# Patient Record
Sex: FEMALE | Race: WHITE | NOT HISPANIC OR LATINO | Employment: UNEMPLOYED | URBAN - METROPOLITAN AREA
[De-identification: names, ages, dates, MRNs, and addresses within clinical notes are randomized per-mention and may not be internally consistent; named-entity substitution may affect disease eponyms.]

---

## 2023-05-02 ENCOUNTER — OFFICE VISIT (OUTPATIENT)
Dept: PRIMARY CARE | Facility: CLINIC | Age: 23
End: 2023-05-02
Payer: COMMERCIAL

## 2023-05-02 VITALS
HEIGHT: 69 IN | TEMPERATURE: 98.5 F | DIASTOLIC BLOOD PRESSURE: 76 MMHG | HEART RATE: 80 BPM | RESPIRATION RATE: 12 BRPM | WEIGHT: 161 LBS | BODY MASS INDEX: 23.85 KG/M2 | SYSTOLIC BLOOD PRESSURE: 122 MMHG | OXYGEN SATURATION: 95 %

## 2023-05-02 DIAGNOSIS — R82.998 URINE LEUKOCYTES: ICD-10-CM

## 2023-05-02 DIAGNOSIS — N23 URINARY TRACT PAIN: ICD-10-CM

## 2023-05-02 DIAGNOSIS — N30.90 CYSTITIS: Primary | ICD-10-CM

## 2023-05-02 PROBLEM — R31.9 HEMATURIA: Status: ACTIVE | Noted: 2023-05-02

## 2023-05-02 PROBLEM — G43.909 MIGRAINE HEADACHE: Status: ACTIVE | Noted: 2023-05-02

## 2023-05-02 PROBLEM — F41.1 GENERALIZED ANXIETY DISORDER: Status: ACTIVE | Noted: 2023-05-02

## 2023-05-02 PROBLEM — M25.50 ARTHRALGIA OF MULTIPLE JOINTS: Status: ACTIVE | Noted: 2023-05-02

## 2023-05-02 PROBLEM — L30.9 DERMATITIS: Status: ACTIVE | Noted: 2023-05-02

## 2023-05-02 PROBLEM — F40.10 SOCIAL ANXIETY DISORDER: Status: ACTIVE | Noted: 2023-05-02

## 2023-05-02 PROBLEM — F95.9 TIC DISORDER: Status: ACTIVE | Noted: 2023-05-02

## 2023-05-02 PROBLEM — G43.109 MIGRAINE WITH AURA AND WITHOUT STATUS MIGRAINOSUS, NOT INTRACTABLE: Status: ACTIVE | Noted: 2023-05-02

## 2023-05-02 PROBLEM — L70.9 ACNE: Status: ACTIVE | Noted: 2023-05-02

## 2023-05-02 PROBLEM — R51.9 HEADACHE: Status: ACTIVE | Noted: 2023-05-02

## 2023-05-02 PROBLEM — G90.512 COMPLEX REGIONAL PAIN SYNDROME TYPE 1 OF LEFT UPPER EXTREMITY: Status: ACTIVE | Noted: 2023-05-02

## 2023-05-02 PROBLEM — N20.2 CALCULUS OF KIDNEY WITH CALCULUS OF URETER: Status: ACTIVE | Noted: 2023-05-02

## 2023-05-02 PROBLEM — F95.2 TOURETTE SYNDROME: Status: ACTIVE | Noted: 2023-05-02

## 2023-05-02 PROBLEM — G47.00 INSOMNIA: Status: ACTIVE | Noted: 2023-05-02

## 2023-05-02 LAB
POC APPEARANCE, URINE: CLEAR
POC BILIRUBIN, URINE: ABNORMAL
POC BLOOD, URINE: ABNORMAL
POC COLOR, URINE: ABNORMAL
POC GLUCOSE, URINE: ABNORMAL MG/DL
POC KETONES, URINE: ABNORMAL MG/DL
POC LEUKOCYTES, URINE: ABNORMAL
POC NITRITE,URINE: POSITIVE
POC PH, URINE: 5 PH
POC PROTEIN, URINE: ABNORMAL MG/DL
POC SPECIFIC GRAVITY, URINE: 1.02
POC UROBILINOGEN, URINE: 4 EU/DL

## 2023-05-02 PROCEDURE — 99213 OFFICE O/P EST LOW 20 MIN: CPT

## 2023-05-02 PROCEDURE — 87086 URINE CULTURE/COLONY COUNT: CPT

## 2023-05-02 PROCEDURE — 81003 URINALYSIS AUTO W/O SCOPE: CPT

## 2023-05-02 PROCEDURE — 1036F TOBACCO NON-USER: CPT

## 2023-05-02 RX ORDER — CLONIDINE HYDROCHLORIDE 0.1 MG/1
TABLET, EXTENDED RELEASE ORAL
COMMUNITY
Start: 2022-10-13 | End: 2023-11-28 | Stop reason: SDUPTHER

## 2023-05-02 RX ORDER — QUETIAPINE FUMARATE 50 MG/1
50 TABLET, FILM COATED ORAL NIGHTLY
COMMUNITY
End: 2023-10-30 | Stop reason: SDUPTHER

## 2023-05-02 RX ORDER — RIZATRIPTAN BENZOATE 10 MG/1
10 TABLET ORAL DAILY PRN
COMMUNITY
Start: 2022-01-24

## 2023-05-02 RX ORDER — SULFAMETHOXAZOLE AND TRIMETHOPRIM 800; 160 MG/1; MG/1
1 TABLET ORAL 2 TIMES DAILY
Qty: 10 TABLET | Refills: 0 | Status: SHIPPED | OUTPATIENT
Start: 2023-05-02 | End: 2023-05-07

## 2023-05-02 RX ORDER — NORELGESTROMIN AND ETHINYL ESTRADIOL 150; 35 UG/D; UG/D
1 PATCH TRANSDERMAL
COMMUNITY
End: 2023-10-17 | Stop reason: ALTCHOICE

## 2023-05-02 RX ORDER — FLUOXETINE HYDROCHLORIDE 40 MG/1
1 CAPSULE ORAL DAILY
COMMUNITY
Start: 2022-01-03 | End: 2023-10-17 | Stop reason: SDUPTHER

## 2023-05-02 RX ORDER — HYDROXYCHLOROQUINE SULFATE 200 MG/1
1 TABLET, FILM COATED ORAL DAILY
COMMUNITY
Start: 2022-10-19 | End: 2023-10-17 | Stop reason: SDUPTHER

## 2023-05-02 ASSESSMENT — ENCOUNTER SYMPTOMS
CHILLS: 1
VOMITING: 1
NAUSEA: 1
FLANK PAIN: 0
FREQUENCY: 1
SWEATS: 0
HEMATURIA: 0

## 2023-05-02 ASSESSMENT — PATIENT HEALTH QUESTIONNAIRE - PHQ9
SUM OF ALL RESPONSES TO PHQ9 QUESTIONS 1 AND 2: 0
2. FEELING DOWN, DEPRESSED OR HOPELESS: NOT AT ALL
1. LITTLE INTEREST OR PLEASURE IN DOING THINGS: NOT AT ALL

## 2023-05-02 ASSESSMENT — COLUMBIA-SUICIDE SEVERITY RATING SCALE - C-SSRS
6. HAVE YOU EVER DONE ANYTHING, STARTED TO DO ANYTHING, OR PREPARED TO DO ANYTHING TO END YOUR LIFE?: NO
2. HAVE YOU ACTUALLY HAD ANY THOUGHTS OF KILLING YOURSELF?: NO
1. IN THE PAST MONTH, HAVE YOU WISHED YOU WERE DEAD OR WISHED YOU COULD GO TO SLEEP AND NOT WAKE UP?: NO

## 2023-05-02 NOTE — PATIENT INSTRUCTIONS
Take all of your antibiotics as directed, even if you feel better  Take your antibiotics with food to minimize upset stomach  Eat yogurt 2-3 times per day while taking antibiotics.  If you do not eat yogurt, take a probiotic daily while taking antibiotics

## 2023-05-02 NOTE — PROGRESS NOTES
"Subjective   Patient ID: Allyssa Kelly is a 23 y.o. female who presents for UTI.    UTI   This is a new problem. The current episode started in the past 7 days. The problem occurs every urination. The problem has been gradually improving. The quality of the pain is described as burning. The pain is at a severity of 5/10. The pain is moderate. The maximum temperature recorded prior to her arrival was 100 - 100.9 F. The fever has been present for Less than 1 day. She is Sexually active. There is No history of pyelonephritis. Associated symptoms include chills, a discharge, frequency, nausea, urgency and vomiting. Pertinent negatives include no flank pain, hematuria, hesitancy, possible pregnancy or sweats. She has tried antibiotics (pt took some of moms keflex which has improved syptoms only had 3 pills.) for the symptoms. The treatment provided moderate relief. Her past medical history is significant for kidney stones and recurrent UTIs. There is no history of catheterization, a single kidney, urinary stasis or a urological procedure.        Review of Systems   Constitutional:  Positive for chills.   Gastrointestinal:  Positive for nausea and vomiting.   Genitourinary:  Positive for frequency and urgency. Negative for flank pain, hematuria and hesitancy.       Objective   /76   Pulse 80   Temp 36.9 °C (98.5 °F)   Resp 12   Ht 1.74 m (5' 8.5\")   Wt 73 kg (161 lb)   SpO2 95%   BMI 24.12 kg/m²     Physical Exam  Abdominal:      General: Abdomen is flat. Bowel sounds are normal.      Palpations: Abdomen is soft.      Tenderness: There is no abdominal tenderness. There is no right CVA tenderness, left CVA tenderness, guarding or rebound.         Assessment/Plan   Problem List Items Addressed This Visit    None  Visit Diagnoses       Cystitis    -  Primary    Relevant Medications    sulfamethoxazole-trimethoprim (Bactrim DS) 800-160 mg tablet    Other Relevant Orders    Urine Culture    Urinary tract " pain        Relevant Orders    POCT UA Automated manually resulted (Completed)    Urine leukocytes        Relevant Orders    Urine Culture

## 2023-05-04 LAB — URINE CULTURE: NORMAL

## 2023-06-14 LAB
ALANINE AMINOTRANSFERASE (SGPT) (U/L) IN SER/PLAS: 20 U/L (ref 7–45)
ALBUMIN (G/DL) IN SER/PLAS: 4.1 G/DL (ref 3.4–5)
ALKALINE PHOSPHATASE (U/L) IN SER/PLAS: 70 U/L (ref 33–110)
ANION GAP IN SER/PLAS: 13 MMOL/L (ref 10–20)
ASPARTATE AMINOTRANSFERASE (SGOT) (U/L) IN SER/PLAS: 31 U/L (ref 9–39)
BILIRUBIN TOTAL (MG/DL) IN SER/PLAS: 0.5 MG/DL (ref 0–1.2)
CALCIUM (MG/DL) IN SER/PLAS: 9.7 MG/DL (ref 8.6–10.3)
CARBON DIOXIDE, TOTAL (MMOL/L) IN SER/PLAS: 28 MMOL/L (ref 21–32)
CHLORIDE (MMOL/L) IN SER/PLAS: 103 MMOL/L (ref 98–107)
CREATININE (MG/DL) IN SER/PLAS: 0.89 MG/DL (ref 0.5–1.05)
ERYTHROCYTE DISTRIBUTION WIDTH (RATIO) BY AUTOMATED COUNT: 13.1 % (ref 11.5–14.5)
ERYTHROCYTE MEAN CORPUSCULAR HEMOGLOBIN CONCENTRATION (G/DL) BY AUTOMATED: 31.9 G/DL (ref 32–36)
ERYTHROCYTE MEAN CORPUSCULAR VOLUME (FL) BY AUTOMATED COUNT: 85 FL (ref 80–100)
ERYTHROCYTES (10*6/UL) IN BLOOD BY AUTOMATED COUNT: 4.95 X10E12/L (ref 4–5.2)
GFR FEMALE: >90 ML/MIN/1.73M2
GLUCOSE (MG/DL) IN SER/PLAS: 70 MG/DL (ref 74–99)
HEMATOCRIT (%) IN BLOOD BY AUTOMATED COUNT: 42 % (ref 36–46)
HEMOGLOBIN (G/DL) IN BLOOD: 13.4 G/DL (ref 12–16)
LEUKOCYTES (10*3/UL) IN BLOOD BY AUTOMATED COUNT: 4.7 X10E9/L (ref 4.4–11.3)
PLATELETS (10*3/UL) IN BLOOD AUTOMATED COUNT: 196 X10E9/L (ref 150–450)
POTASSIUM (MMOL/L) IN SER/PLAS: 4.1 MMOL/L (ref 3.5–5.3)
PROTEIN TOTAL: 6.9 G/DL (ref 6.4–8.2)
SODIUM (MMOL/L) IN SER/PLAS: 140 MMOL/L (ref 136–145)
UREA NITROGEN (MG/DL) IN SER/PLAS: 11 MG/DL (ref 6–23)

## 2023-06-15 LAB
ANTI-CENTROMERE: <0.2 AI
ANTI-CHROMATIN: <0.2 AI
ANTI-DNA (DS): <1 IU/ML
ANTI-JO-1 IGG: <0.2 AI
ANTI-NUCLEAR ANTIBODY (ANA): NEGATIVE
ANTI-RIBOSOMAL P: <0.2 AI
ANTI-RNP: 0.5 AI
ANTI-SCL-70: <0.2 AI
ANTI-SM/RNP: <0.2 AI
ANTI-SM: <0.2 AI
ANTI-SSA: <0.2 AI
ANTI-SSB: <0.2 AI

## 2023-07-18 NOTE — PROGRESS NOTES
"Jatinder Kelly is a 23 y.o. female who presents today for a sick visit    Chief Complaint   Patient presents with    Follow-up     JATINDER IS HERE TODAY WITH C/O INCREASED WEIGHT GAIN AND ALSO \"BLOATING FEELING\",AND STOMACH PRESSURE X 2 MONTHS.   PT IS ALSO REQUESTING A PREGNANCY TEST.   PT HAS C/O SHARP PAIN THAT COMES AND GOES IN THE LEFT LUNG X2 MONTHS.        Symptoms ABDOMINAL BLOATING     Pain at night? CRAMPING ON AND OFF   Weight loss? NONE, HAS GAINED 30# IN THE PAST 5-6 MONTHS   Blood in stool? NONE   Abdominal tenderness? OCCASIONAL  Fever? DENIES   Vomiting? NONE   Fatty Stools? DENIES   Diarrhea? YES     Worse after eating? YES   Coordinates with dairy, wheat, fiber, sugar?     Symptom onset has been for a time period of 4-5 month(s).     Severity is described as mild-moderate.     Course of her symptoms over time is chronic.      Pt saw GYN, Dr. Argueta in June 2023  Had 1st PAP---> NORMAL     Pt is currently not on any birth control  She was using the patch, but will switching to copper IUD next month   LMP: 6/20/2023    Review of Systems  All 13 systems were reviewed and are within normal limits except positive and pertinent negative responses which are noted below or in HPI.        Objective   Vitals:  /80   Pulse 79   Wt 75.8 kg (167 lb)   BMI 25.02 kg/m²         Physical Exam  Vitals reviewed.   Pulmonary:      Effort: Pulmonary effort is normal.   Abdominal:      General: Bowel sounds are normal. There is distension.      Tenderness: There is abdominal tenderness.         Assessment/Plan   Problem List Items Addressed This Visit    None  Visit Diagnoses       Abdominal bloating    -  Primary    Relevant Orders    CBC    Celiac Panel    H. Pylori Breath Test    POCT Pregnancy, Urine manually resulted (Completed)    POCT UA Automated manually resulted (Completed)    Weight gain        Relevant Orders    TSH with reflex to Free T4 if abnormal    Hemoglobin A1C            "

## 2023-07-19 ENCOUNTER — LAB (OUTPATIENT)
Dept: LAB | Facility: LAB | Age: 23
End: 2023-07-19
Payer: COMMERCIAL

## 2023-07-19 ENCOUNTER — OFFICE VISIT (OUTPATIENT)
Dept: PRIMARY CARE | Facility: CLINIC | Age: 23
End: 2023-07-19
Payer: COMMERCIAL

## 2023-07-19 VITALS
HEART RATE: 79 BPM | SYSTOLIC BLOOD PRESSURE: 126 MMHG | DIASTOLIC BLOOD PRESSURE: 80 MMHG | BODY MASS INDEX: 25.02 KG/M2 | WEIGHT: 167 LBS

## 2023-07-19 DIAGNOSIS — R63.5 WEIGHT GAIN: ICD-10-CM

## 2023-07-19 DIAGNOSIS — R14.0 ABDOMINAL BLOATING: ICD-10-CM

## 2023-07-19 DIAGNOSIS — R14.0 ABDOMINAL BLOATING: Primary | ICD-10-CM

## 2023-07-19 PROBLEM — G89.29 NECK PAIN, CHRONIC: Status: ACTIVE | Noted: 2023-07-19

## 2023-07-19 PROBLEM — L93.2 CUTANEOUS LUPUS ERYTHEMATOSUS: Status: ACTIVE | Noted: 2023-07-19

## 2023-07-19 PROBLEM — D16.9 BENIGN BONE TUMOR: Status: ACTIVE | Noted: 2023-07-19

## 2023-07-19 PROBLEM — M54.2 NECK PAIN, CHRONIC: Status: ACTIVE | Noted: 2023-07-19

## 2023-07-19 LAB
DEAMIDATED GLIADIN PEPTIDE IGA: <1 U/ML (ref 0–14)
DEAMIDATED GLIADIN PEPTIDE IGG: <1 U/ML (ref 0–14)
ERYTHROCYTE DISTRIBUTION WIDTH (RATIO) BY AUTOMATED COUNT: 13.2 % (ref 11.5–14.5)
ERYTHROCYTE MEAN CORPUSCULAR HEMOGLOBIN CONCENTRATION (G/DL) BY AUTOMATED: 32.6 G/DL (ref 32–36)
ERYTHROCYTE MEAN CORPUSCULAR VOLUME (FL) BY AUTOMATED COUNT: 86 FL (ref 80–100)
ERYTHROCYTES (10*6/UL) IN BLOOD BY AUTOMATED COUNT: 4.82 X10E12/L (ref 4–5.2)
ESTIMATED AVERAGE GLUCOSE FOR HBA1C: 91 MG/DL
HEMATOCRIT (%) IN BLOOD BY AUTOMATED COUNT: 41.4 % (ref 36–46)
HEMOGLOBIN (G/DL) IN BLOOD: 13.5 G/DL (ref 12–16)
HEMOGLOBIN A1C/HEMOGLOBIN TOTAL IN BLOOD: 4.8 %
LEUKOCYTES (10*3/UL) IN BLOOD BY AUTOMATED COUNT: 5.8 X10E9/L (ref 4.4–11.3)
PLATELETS (10*3/UL) IN BLOOD AUTOMATED COUNT: 212 X10E9/L (ref 150–450)
POC APPEARANCE, URINE: CLEAR
POC BILIRUBIN, URINE: NEGATIVE
POC BLOOD, URINE: NEGATIVE
POC COLOR, URINE: YELLOW
POC GLUCOSE, URINE: NEGATIVE MG/DL
POC KETONES, URINE: NEGATIVE MG/DL
POC LEUKOCYTES, URINE: NEGATIVE
POC NITRITE,URINE: NEGATIVE
POC PH, URINE: 6 PH
POC PROTEIN, URINE: NEGATIVE MG/DL
POC SPECIFIC GRAVITY, URINE: 1.02
POC UROBILINOGEN, URINE: 0.2 EU/DL
PREGNANCY TEST URINE, POC: NEGATIVE
THYROTROPIN (MIU/L) IN SER/PLAS BY DETECTION LIMIT <= 0.05 MIU/L: 0.92 MIU/L (ref 0.44–3.98)
TISSUE TRANSGLUTAMINASE IGG: <1 U/ML (ref 0–14)
TISSUE TRANSGLUTAMINASE, IGA: <1 U/ML (ref 0–14)

## 2023-07-19 PROCEDURE — 81025 URINE PREGNANCY TEST: CPT | Performed by: NURSE PRACTITIONER

## 2023-07-19 PROCEDURE — 85027 COMPLETE CBC AUTOMATED: CPT

## 2023-07-19 PROCEDURE — 83036 HEMOGLOBIN GLYCOSYLATED A1C: CPT

## 2023-07-19 PROCEDURE — 99213 OFFICE O/P EST LOW 20 MIN: CPT | Performed by: NURSE PRACTITIONER

## 2023-07-19 PROCEDURE — 84443 ASSAY THYROID STIM HORMONE: CPT

## 2023-07-19 PROCEDURE — 81003 URINALYSIS AUTO W/O SCOPE: CPT | Performed by: NURSE PRACTITIONER

## 2023-07-19 PROCEDURE — 83013 H PYLORI (C-13) BREATH: CPT

## 2023-07-19 PROCEDURE — 1036F TOBACCO NON-USER: CPT | Performed by: NURSE PRACTITIONER

## 2023-07-19 PROCEDURE — 83516 IMMUNOASSAY NONANTIBODY: CPT

## 2023-07-19 PROCEDURE — 36415 COLL VENOUS BLD VENIPUNCTURE: CPT

## 2023-07-19 NOTE — PATIENT INSTRUCTIONS
Thank you for seeing me today.  It was a pleasure to see you again!    #ABDOMINAL BLOATING  Labs ordered   Breath test ordered    #WEIGHT GAIN  Labs ordered    RTC AS NEEDED

## 2023-07-20 DIAGNOSIS — R14.0 ABDOMINAL BLOATING: Primary | ICD-10-CM

## 2023-07-20 LAB — H. PYLORI UBIT: NEGATIVE

## 2023-07-29 LAB
CHLAMYDIA TRACH., AMPLIFIED: NEGATIVE
N. GONORRHEA, AMPLIFIED: NEGATIVE
TRICHOMONAS VAGINALIS: NEGATIVE

## 2023-10-02 ENCOUNTER — LAB (OUTPATIENT)
Dept: LAB | Facility: LAB | Age: 23
End: 2023-10-02
Payer: COMMERCIAL

## 2023-10-02 DIAGNOSIS — Z11.1 SCREENING FOR TUBERCULOSIS: Primary | ICD-10-CM

## 2023-10-02 DIAGNOSIS — Z11.1 SCREENING FOR TUBERCULOSIS: ICD-10-CM

## 2023-10-02 PROCEDURE — 36415 COLL VENOUS BLD VENIPUNCTURE: CPT

## 2023-10-02 PROCEDURE — 86481 TB AG RESPONSE T-CELL SUSP: CPT

## 2023-10-03 PROBLEM — L23.2 ALLERGIC CONTACT DERMATITIS DUE TO COSMETICS: Status: ACTIVE | Noted: 2022-04-28

## 2023-10-03 PROBLEM — Z87.2: Status: ACTIVE | Noted: 2023-10-03

## 2023-10-03 PROBLEM — R14.0 ABDOMINAL BLOATING: Status: ACTIVE | Noted: 2023-10-03

## 2023-10-03 PROBLEM — Z79.899 LONG-TERM USE OF PLAQUENIL: Status: ACTIVE | Noted: 2023-10-03

## 2023-10-03 PROBLEM — L65.0 TELOGEN EFFLUVIUM: Status: ACTIVE | Noted: 2022-04-28

## 2023-10-03 PROBLEM — L30.9 ECZEMA: Status: ACTIVE | Noted: 2023-10-03

## 2023-10-03 PROBLEM — L65.9 NONSCARRING HAIR LOSS, UNSPECIFIED: Status: ACTIVE | Noted: 2022-04-28

## 2023-10-03 RX ORDER — ISOTRETINOIN 30 MG/1
30 CAPSULE ORAL
COMMUNITY
Start: 2021-09-08 | End: 2023-10-17 | Stop reason: ALTCHOICE

## 2023-10-03 RX ORDER — FLUOCINONIDE 0.5 MG/G
1 CREAM TOPICAL
COMMUNITY
Start: 2022-04-28 | End: 2023-11-28 | Stop reason: ALTCHOICE

## 2023-10-03 RX ORDER — DOXYCYCLINE 100 MG/1
100 CAPSULE ORAL
COMMUNITY
Start: 2017-10-30 | End: 2023-10-17 | Stop reason: ALTCHOICE

## 2023-10-03 RX ORDER — VIT C/E/ZN/COPPR/LUTEIN/ZEAXAN 250MG-90MG
25 CAPSULE ORAL DAILY
COMMUNITY

## 2023-10-03 RX ORDER — DOXYCYCLINE HYCLATE 150 MG/1
150 TABLET ORAL
COMMUNITY
Start: 2019-05-29 | End: 2023-10-17 | Stop reason: ALTCHOICE

## 2023-10-03 RX ORDER — PNV NO.95/FERROUS FUM/FOLIC AC 28MG-0.8MG
TABLET ORAL
COMMUNITY
End: 2023-11-28 | Stop reason: ALTCHOICE

## 2023-10-03 RX ORDER — TRIAMCINOLONE ACETONIDE 1 MG/G
1 CREAM TOPICAL
COMMUNITY
Start: 2019-09-04 | End: 2023-11-28 | Stop reason: ALTCHOICE

## 2023-10-03 RX ORDER — MINOCYCLINE HYDROCHLORIDE 50 MG/1
50 TABLET ORAL
COMMUNITY
Start: 2020-05-28 | End: 2023-10-17 | Stop reason: ALTCHOICE

## 2023-10-03 RX ORDER — MULTIVIT-MIN/IRON/FOLIC ACID/K 18-600-40
1 CAPSULE ORAL DAILY
COMMUNITY

## 2023-10-03 RX ORDER — MINOCYCLINE HYDROCHLORIDE 100 MG/1
100 CAPSULE ORAL
COMMUNITY
Start: 2018-07-23 | End: 2023-10-17 | Stop reason: ALTCHOICE

## 2023-10-04 ENCOUNTER — OFFICE VISIT (OUTPATIENT)
Dept: RHEUMATOLOGY | Facility: CLINIC | Age: 23
End: 2023-10-04
Payer: COMMERCIAL

## 2023-10-04 VITALS
DIASTOLIC BLOOD PRESSURE: 62 MMHG | BODY MASS INDEX: 24.88 KG/M2 | HEIGHT: 69 IN | SYSTOLIC BLOOD PRESSURE: 100 MMHG | WEIGHT: 168 LBS

## 2023-10-04 DIAGNOSIS — M25.512 BILATERAL SHOULDER PAIN, UNSPECIFIED CHRONICITY: ICD-10-CM

## 2023-10-04 DIAGNOSIS — R14.0 ABDOMINAL BLOATING: ICD-10-CM

## 2023-10-04 DIAGNOSIS — M25.50 ARTHRALGIA OF MULTIPLE JOINTS: Primary | ICD-10-CM

## 2023-10-04 DIAGNOSIS — M25.511 BILATERAL SHOULDER PAIN, UNSPECIFIED CHRONICITY: ICD-10-CM

## 2023-10-04 DIAGNOSIS — L93.2 CUTANEOUS LUPUS ERYTHEMATOSUS: ICD-10-CM

## 2023-10-04 PROCEDURE — 1036F TOBACCO NON-USER: CPT | Performed by: INTERNAL MEDICINE

## 2023-10-04 PROCEDURE — 99214 OFFICE O/P EST MOD 30 MIN: CPT | Performed by: INTERNAL MEDICINE

## 2023-10-04 ASSESSMENT — ENCOUNTER SYMPTOMS
DYSURIA: 0
SLEEP DISTURBANCE: 0
DIZZINESS: 0
FATIGUE: 0
ABDOMINAL PAIN: 0
HEADACHES: 0
SHORTNESS OF BREATH: 0
ADENOPATHY: 0

## 2023-10-04 NOTE — PROGRESS NOTES
Subjective   Patient ID: Allyssa Kelly is a 23 y.o. female who presents for Joint Pain (Follow up ).  HPI  Patient with history of cutaneous lupus with negative ISRRAEL/BROOKS panel.    At her last visit we had her continue with her Plaquenil.  We again tested her ISRRAEL and BROOKS panel and this was negative.      She has had increase in bloating.  She was also negative for celiac.  She had a negative breath test for SIBO.  She did change her diet.  Didn't change.  She was talking to other family members and they wondered if it may be her Plaquenil.  She wanted to discuss with me.  She has yet to stop it on her own.  Otherwise her skin has been doing very well and has not had a bad flare of her skin problems since being on Plaquenil.    She has noticed that she has been getting some increase in joint pain in her hips, wrists and knees.  She has not been working out as much because she has difficulty lifting some weights.  She has pain in her shoulders seems to be the worst part.  She will occasionally take ibuprofen but will also do heat and ice and compression.  Some days is not too bad and some days it is all day long.  She is not going to the gym as much as she used to.  She works several hours and may do heavy lifting and she also does a lot of schoolwork.    Review of Systems   Constitutional:  Negative for fatigue.   HENT:  Negative for mouth sores.    Eyes:  Negative for visual disturbance.   Respiratory:  Negative for shortness of breath.    Cardiovascular:  Negative for chest pain.   Gastrointestinal:  Negative for abdominal pain.   Genitourinary:  Negative for dysuria.   Skin:  Negative for rash.   Neurological:  Negative for dizziness and headaches.   Hematological:  Negative for adenopathy.   Psychiatric/Behavioral:  Negative for sleep disturbance.        Objective   Physical Exam  GEN: NAD A&O x3 appropriate affect  HENT:no enlarged glands or thyroid  EYES:  no conjunctival redness, eyelids normal  LYMPH: no  cervical lymphadenopathy  NEURO: 5/5 strength upper and lower extremities, DTR +2 bicep and patellar tendons  SKIN: Some mild redness on the left side of her forehead  PULSES: +radials  MSK:  No swelling in the DIP PIP MCP wrist does have some mild tenderness  No swelling in the elbows  Pain with infraspinatus motion of the shoulders more on the left than the right  Has mild tenderness on the inside of the right knee  No tenderness in the trochanteric bursa  Mild hyperextension of the cervical spine  Tenderness in the left ankle medially  Assessment/Plan        Presumed cutaneous lupus currently on Plaquenil with negative ISRRAEL/BROOKS panel.  Plaquenil has been helpful for skin lesions but uncertain if it may be contributing to her bloating.  Discussed about stopping it for a few weeks and if her symptoms improve may consider the use of azathioprine or CellCept.  If her symptoms continue then she can resume hydroxychloroquine use.    Bilateral shoulder pain suspect some rotator cuff tendinitis.  We will send her to physical therapy.  She has occasional neck pain as well and discussed for her to be careful with her hyperextension.  She has had previous surgeries in her bilateral Achilles in the past.

## 2023-10-06 LAB
NIL(NEG) CONTROL SPOT COUNT: NORMAL
PANEL A SPOT COUNT: 0
PANEL B SPOT COUNT: 0
POS CONTROL SPOT COUNT: NORMAL
T-SPOT. TB INTERPRETATION: NEGATIVE

## 2023-10-11 ENCOUNTER — HOSPITAL ENCOUNTER (EMERGENCY)
Facility: HOSPITAL | Age: 23
Discharge: HOME | End: 2023-10-11
Payer: COMMERCIAL

## 2023-10-11 ENCOUNTER — TELEPHONE (OUTPATIENT)
Dept: RHEUMATOLOGY | Facility: CLINIC | Age: 23
End: 2023-10-11
Payer: COMMERCIAL

## 2023-10-11 VITALS
RESPIRATION RATE: 24 BRPM | WEIGHT: 170 LBS | HEART RATE: 103 BPM | TEMPERATURE: 98.2 F | HEIGHT: 69 IN | DIASTOLIC BLOOD PRESSURE: 86 MMHG | OXYGEN SATURATION: 100 % | SYSTOLIC BLOOD PRESSURE: 128 MMHG | BODY MASS INDEX: 25.18 KG/M2

## 2023-10-11 PROCEDURE — 4500999001 HC ED NO CHARGE

## 2023-10-11 PROCEDURE — 99281 EMR DPT VST MAYX REQ PHY/QHP: CPT

## 2023-10-11 NOTE — TELEPHONE ENCOUNTER
She has taken Benadryl and Pepcid.  She still has a steroid pack at home that she has not used.  It is in her right eye, lips and in her cheeks.  This is never happened to her before.  We just saw her a few days ago and discussed about stopping the hydroxychloroquine to see if it may be contributing to her bloating.  Told her that stopping it just a few days and she is never had this before it could be the cause of this.  May have allergic reaction to something.  She will use her steroid pack and if she has any issues will go to the emergency room or call 911.  If after the steroid pack she still has problems I suggest her to see an immunologist.

## 2023-10-17 ENCOUNTER — OFFICE VISIT (OUTPATIENT)
Dept: RHEUMATOLOGY | Facility: CLINIC | Age: 23
End: 2023-10-17
Payer: COMMERCIAL

## 2023-10-17 VITALS
SYSTOLIC BLOOD PRESSURE: 125 MMHG | WEIGHT: 167.6 LBS | HEART RATE: 107 BPM | HEIGHT: 69 IN | BODY MASS INDEX: 24.82 KG/M2 | DIASTOLIC BLOOD PRESSURE: 86 MMHG

## 2023-10-17 DIAGNOSIS — T78.3XXA ANGIOEDEMA, INITIAL ENCOUNTER: Primary | ICD-10-CM

## 2023-10-17 DIAGNOSIS — L93.2 CUTANEOUS LUPUS ERYTHEMATOSUS: ICD-10-CM

## 2023-10-17 PROCEDURE — 99213 OFFICE O/P EST LOW 20 MIN: CPT | Performed by: INTERNAL MEDICINE

## 2023-10-17 PROCEDURE — 1036F TOBACCO NON-USER: CPT | Performed by: INTERNAL MEDICINE

## 2023-10-17 ASSESSMENT — ENCOUNTER SYMPTOMS
LOSS OF SENSATION IN FEET: 0
OCCASIONAL FEELINGS OF UNSTEADINESS: 0

## 2023-10-17 NOTE — PROGRESS NOTES
Subjective   Patient ID: Allyssa Kelly is a 23 y.o. female who presents for Follow-up (New medication).  HPI  Subjective   Patient ID: Allyssa Kelly is a 23 y.o. female who presents for Joint Pain (Follow up ).  HPI  Patient with history of cutaneous lupus with negative ISRRAEL/BROOKS panel.    Patient is here for an acute visit.    We just recently saw her On 10/4/2023.  We had discontinued her hydroxychloroquine because of GI symptoms.  We also talked about her going to physical therapy with her bilateral shoulder pain.    Patient had contacted us on 10/11/2023.    She started getting angioedema symptoms.  It started out in her lips and then reached her eye and then her cheeks.  She had taken Benadryl and Pepcid at home but it kept ongoing.  I did put in for a steroid pack but if she had any issues such as increasing shortness of breath to go to the emergency room or call 911.  Told her that if after the steroid pack she still had problems that we may send her to an immunologist.    She had seen an immunologist a few years ago and had negative work-up.  She had skin testing at that time.    She had gone to the emergency room later that night assistance by her boyfriend and waited there for a few hours and left without being seen.  She stayed with family member who is a medical professional.    The steroids did significantly help.    Today is her last day of steroid.    She has not had any new medications or any new products.    Patient also had a chance of looking over her medications and is concerned about taking CellCept.  Family members have asked her to ask about methotrexate.    Review of Systems   Constitutional:  Negative for fatigue.   HENT:  Negative for mouth sores.    Eyes:  Negative for visual disturbance.   Respiratory: Did feel that she was short of breath and had normal pulse ox  Cardiovascular:  Negative for chest pain.   Gastrointestinal:  Negative for abdominal pain.   Genitourinary:   Negative for dysuria.   Skin: Per HPI  Neurological:  Negative for dizziness and headaches.   Hematological:  Negative for adenopathy.   Psychiatric/Behavioral:  Negative for sleep disturbance.        Objective   Physical Exam  GEN: NAD A&O x3 appropriate affect  SKIN: No current swelling in her lips or face.  PULSES: +radials  MSK:    Assessment/Plan   Angioedema -uncertain what brought this on as she has not had any new medications.  When she had last seen us, she did have some mild redness on the left side of her forehead.    Today is her last day of steroid.  I would like her to wait till the next few days to do her blood work.  We have reviewed her blood work from this past June and everything was fine.    Cutaneous lupus -side effects with hydroxychloroquine of increasing bloating.  This has resolved since she discontinued medication.  She is concerned about side effects of CellCept.  She has read up about the azathioprine methotrexate and family members have had positive improvement with methotrexate.  We can consider this but she understands of pregnancy risk.  We will discuss this issue with her angioedema has resolved.

## 2023-10-20 ENCOUNTER — LAB (OUTPATIENT)
Dept: LAB | Facility: LAB | Age: 23
End: 2023-10-20
Payer: COMMERCIAL

## 2023-10-20 DIAGNOSIS — T78.3XXA ANGIOEDEMA, INITIAL ENCOUNTER: ICD-10-CM

## 2023-10-20 PROCEDURE — 86376 MICROSOMAL ANTIBODY EACH: CPT

## 2023-10-20 PROCEDURE — 86160 COMPLEMENT ANTIGEN: CPT

## 2023-10-20 PROCEDURE — 86225 DNA ANTIBODY NATIVE: CPT

## 2023-10-20 PROCEDURE — 86038 ANTINUCLEAR ANTIBODIES: CPT

## 2023-10-20 PROCEDURE — 86235 NUCLEAR ANTIGEN ANTIBODY: CPT

## 2023-10-20 PROCEDURE — 36415 COLL VENOUS BLD VENIPUNCTURE: CPT

## 2023-10-21 LAB
C3 SERPL-MCNC: 130 MG/DL (ref 87–200)
C4 SERPL-MCNC: 26 MG/DL (ref 10–50)
CENTROMERE B AB SER-ACNC: <0.2 AI
CHROMATIN AB SERPL-ACNC: <0.2 AI
DSDNA AB SER-ACNC: <1 IU/ML
ENA JO1 AB SER QL IA: <0.2 AI
ENA RNP AB SER IA-ACNC: 0.5 AI
ENA SCL70 AB SER QL IA: <0.2 AI
ENA SM AB SER IA-ACNC: <0.2 AI
ENA SM+RNP AB SER QL IA: <0.2 AI
ENA SS-A AB SER IA-ACNC: <0.2 AI
ENA SS-B AB SER IA-ACNC: <0.2 AI
RIBOSOMAL P AB SER-ACNC: <0.2 AI
THYROPEROXIDASE AB SERPL-ACNC: 30 IU/ML

## 2023-10-23 LAB — ANA SER QL HEP2 SUBST: NEGATIVE

## 2023-10-23 NOTE — PROGRESS NOTES
Physical Therapy Evaluation and Treatment      Patient Name: Allyssa Kelly  MRN: [id number]  Today's Date: 10/24/2023  Time Calculation  Start Time: 0745  Stop Time: 0832  Time Calculation (min): 47 min      Assessment:   Patient is 23 year old who presents to physical therapy with signs and symptoms consistent with cervicalgia and B shoulder pain. Hx of lupus with intermittent flare ups.  Also hx of L ulnar nerve reconstruction with associated deficits and weakness. Patient has decreased shld and neck ROM, increased mm tone and reduced strength limiting functional mobility and ADLs. Pt would benefit from skilled physical therapy in order to address the stated deficits and return to daily tasks with reduced pain and improved function.      Plan:  Treatment/Interventions: Cryotherapy, Education/ Instruction, Electrical stimulation, Gait training, Hot pack, Manual therapy, Neuromuscular re-education, Therapeutic activities, Therapeutic exercises  PT Plan: Skilled PT (Cervical retractions for postural corrections, cervical and shld mobility, scap and shld strengthening, STM prn)  PT Frequency: 2 times per week  Duration: 8 weeks  Number of Treatments Authorized: 1/30 ( employee)  Rehab Potential: Good  Plan of Care Agreement: Patient    Current Problem:   1. Neck pain, chronic  Follow Up In Physical Therapy      2. Bilateral shoulder pain, unspecified chronicity  Referral to Physical Therapy    Follow Up In Physical Therapy          Subjective    General:  General  Reason for Referral: Neck and B shld pain  Referred By: Betsy Boateng  Past Medical History Relevant to Rehab: Lupus  General Comment: Pt goes by Kanika  Pt reports she's had more joint pain recently. B shld pain and neck pain. B shld pain present all the time. Worse with reaching overhead and lifting. Neck stiffness, particularly in morning. Difficulty with turning head and general mobility. Doing some stretches which seem to help. Some days worse  than others - difficulty with ADLs at times. 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   Use OTC as needed.   Diagnosed with Lupus more recently ~2022, but has had symptoms since she was about 15 years old. L ulnar nerve surgery, with residual weakness, tingling and pinching feeling in elbow. Hx of B achilles lengthening and subsequent repair R achilles, some residual weakness R achilles.     Precautions:  Precautions  STEADI Fall Risk Score (The score of 4 or more indicates an increased risk of falling): 0  Precautions Comment: none  Pain:  Pain Assessment  Pain Assessment: 0-10  Pain Score: 5 - Moderate pain (2-3/10 at best, 10/10 at worst)  Pain Type: Chronic pain (aching/dull mainly, occasionally sharp pain)  Pain Location: Shoulder  Prior Level of Function:   Pt was independent with all ADLs/iADLs prior to recent injury.    Objective   Cervical   Flexion 20  Extension 43 pain*  SB (R,L) 17, 28  Rotation (R,L) 65, 75    Shld AROM (degrees) pain**     Flexion (R,L) 130, 110    Ext (R,L) 40, 40    Abduction (R,L) 113, 100    ER behind head (R,L) T2, base of skull    IR behind back (R,L) L1, L5    L elbow ROM 0- * very painful this date - not always painful     Shld MMT:   R Flexion 4/5      Abduction 4/5      ER 4/5      IR 4/5     L Flexion NT d/t pain       Abduction NT d/t pain      ER neutral 3+/5 pain      IR neutral 3+/5 pain    (+) tenderness cervical mms, L=R UT/RTC/lev scap region      Outcome Measures:  Other Measures  Neck Disability Index: 42%     Treatments:  Therapeutic Exercise  Therapeutic Exercise Performed: Yes  Therapeutic Exercise Activity 1: Seated cervical retractions x 10 reps  Therapeutic Exercise Activity 2: Seated scap retractions x 10 reps  Therapeutic Exercise Activity 3: B shld flexion supine - painful hold  Therapeutic Exercise Activity 4: R,L dowel shld flexion x 10 reps  Therapeutic Exercise Activity 5: R,L flexion wall slides x 10 reps  each  Therapeutic Exercise Activity 6: R,L scaption wall slides x 10 reps    Manual Therapy  Manual Therapy Performed: Yes  Manual Therapy Activity 1: Cervical distraction light  Manual Therapy Activity 2: B UT/lev scap strumming      OP EDUCATION:  Education  Education Comment: Access Code: [email or web address]  URL: [email or web address]  Date: 10/24/2023  Prepared by: Tea Benedict    Exercises  - Seated Cervical Retraction  - 3-5 x daily - 7 x weekly - 1 sets - 10 reps  - Seated Scapular Retraction  - 2 x daily - 7 x weekly - 1 sets - 10 reps - 2 hold  - Single Arm Doorway Pec Stretch at 90 Degrees Abduction  - 2 x daily - 7 x weekly - 1 sets - 2-3 reps - 20-30 hold  - Shoulder Scaption AAROM with Dowel  - 1-2 x daily - 7 x weekly - 1 sets - 10 reps  - Shoulder Flexion Wall Slide with Towel  - 2 x daily - 7 x weekly - 1-2 sets - 10 reps  - Scaption Wall Slide with Towel  - 2 x daily - 7 x weekly - 1-2 sets - 10 reps    Goals:  Active       Neck and B shoulder pain       STG       Start:  10/24/23    Expected End:  11/21/23       1) Patient will improve NDI score by 5% to show an improvement in function in 4 weeks.  2) Patient will show an improvement in cervical SB ROM by 5 degrees B in order to allow for improved driving ability in 4 weeks.  3) Patient will be able to perform ADLs without pain exceeding 4/10 on the VAS in 4 weeks.  4) Patient will be independent with HEP in 3 visits in order to allow for improved function with home and community tasks.          LTG       Start:  10/24/23    Expected End:  12/19/23       1) Patient will improve NDI score to </=15% to show an improvement in function at home in 8 weeks.  2) Patient will improve scapular strength to >/=4+/5 in order to reduce compensatory guarding in upper trapezius and greater functional use of upper extremities in 8 weeks.   3) Patient will be able to perform ADLs without pain exceeding 2 /10 on the VAS in 8 weeks.  4) Patient will  return to all work related tasks to improve QOL in 8 weeks.   5) Pt will have full shoulder mobility B to improve ADLs and dressing by discharge.

## 2023-10-24 ENCOUNTER — EVALUATION (OUTPATIENT)
Dept: PHYSICAL THERAPY | Facility: CLINIC | Age: 23
End: 2023-10-24
Payer: COMMERCIAL

## 2023-10-24 DIAGNOSIS — G89.29 NECK PAIN, CHRONIC: Primary | ICD-10-CM

## 2023-10-24 DIAGNOSIS — M25.511 BILATERAL SHOULDER PAIN, UNSPECIFIED CHRONICITY: ICD-10-CM

## 2023-10-24 DIAGNOSIS — M54.2 NECK PAIN, CHRONIC: Primary | ICD-10-CM

## 2023-10-24 DIAGNOSIS — M25.512 BILATERAL SHOULDER PAIN, UNSPECIFIED CHRONICITY: ICD-10-CM

## 2023-10-24 PROCEDURE — 97110 THERAPEUTIC EXERCISES: CPT | Mod: GP | Performed by: PHYSICAL THERAPIST

## 2023-10-24 PROCEDURE — 97161 PT EVAL LOW COMPLEX 20 MIN: CPT | Mod: GP | Performed by: PHYSICAL THERAPIST

## 2023-10-24 ASSESSMENT — ENCOUNTER SYMPTOMS
LOSS OF SENSATION IN FEET: 0
DEPRESSION: 0
OCCASIONAL FEELINGS OF UNSTEADINESS: 0

## 2023-10-24 ASSESSMENT — PAIN SCALES - GENERAL: PAINLEVEL_OUTOF10: 5 - MODERATE PAIN

## 2023-10-24 ASSESSMENT — PAIN - FUNCTIONAL ASSESSMENT: PAIN_FUNCTIONAL_ASSESSMENT: 0-10

## 2023-10-24 NOTE — Clinical Note
October 24, 2023     Patient: Allyssa Kelly   YOB: 2000   Date of Visit: 10/24/2023       To Whom It May Concern:    It is my medical opinion that Allyssa Kelly {Work release (duty restriction):93374}.    If you have any questions or concerns, please don't hesitate to call.         Sincerely,        Tea Benedict, PT    CC: No Recipients

## 2023-10-24 NOTE — Clinical Note
October 24, 2023     Patient: Allyssa Kelly   YOB: 2000   Date of Visit: 10/24/2023       To Whom it May Concern:    Allyssa Kelly was seen in my clinic on 10/24/2023. She {Return to school/sport:15947}.    If you have any questions or concerns, please don't hesitate to call.         Sincerely,          Tea Benedict, PT        CC: No Recipients

## 2023-10-30 ENCOUNTER — TELEMEDICINE (OUTPATIENT)
Dept: BEHAVIORAL HEALTH | Facility: CLINIC | Age: 23
End: 2023-10-30
Payer: COMMERCIAL

## 2023-10-30 ENCOUNTER — PHARMACY VISIT (OUTPATIENT)
Dept: PHARMACY | Facility: CLINIC | Age: 23
End: 2023-10-30
Payer: COMMERCIAL

## 2023-10-30 DIAGNOSIS — F95.9 TIC DISORDER: ICD-10-CM

## 2023-10-30 DIAGNOSIS — F41.1 GENERALIZED ANXIETY DISORDER: Primary | ICD-10-CM

## 2023-10-30 PROCEDURE — 99214 OFFICE O/P EST MOD 30 MIN: CPT | Performed by: PSYCHIATRY & NEUROLOGY

## 2023-10-30 RX ORDER — QUETIAPINE FUMARATE 50 MG/1
25 TABLET, FILM COATED ORAL NIGHTLY
Qty: 45 TABLET | Refills: 2 | Status: SHIPPED | OUTPATIENT
Start: 2023-10-30 | End: 2024-02-05 | Stop reason: SINTOL

## 2023-10-30 RX ORDER — FLUOXETINE HYDROCHLORIDE 40 MG/1
40 CAPSULE ORAL DAILY
Qty: 90 CAPSULE | Refills: 2 | Status: SHIPPED | OUTPATIENT
Start: 2023-10-30 | End: 2024-07-26

## 2023-10-30 RX ORDER — CLONIDINE HYDROCHLORIDE 0.1 MG/1
0.2 TABLET, EXTENDED RELEASE ORAL NIGHTLY
Qty: 180 TABLET | Refills: 2 | Status: SHIPPED | OUTPATIENT
Start: 2023-10-30 | End: 2024-07-26

## 2023-10-30 NOTE — PROGRESS NOTES
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 With joint/UE challenges - has to have surgery for pinched ulnar nerve  - mood and anxiety generally well controlled  - able to sleep relatively well with 25 mg of quetiapine  - cont. With complex tics - approx 1x/week; at times cluster together; has approx 1-2 weeks between them; tried long acting clonidine in morning and was very sedated    Mental Status Exam:  General Appearance: Well groomed, appropriate eye contact.  Attitude/Behavior: Cooperative, conversant, engaged, and with good eye contact.  Motor: No psychomotor agitation or retardation, no tremor or other abnormal movements.  Speech: Normal rate, volume, prosody  Mood: \"good\"  Affect: Euthymic, full-range  Thought Process: Linear, goal directed  Thought Associations: No loosening of associations  Thought Content: normal  Perception: No perceptual abnormalities noted  Sensorium: Alert and oriented to person, place, time and situation  Insight: Intact  Judgment: Intact  Cognition: Cognitively intact to conversational testing with respect to attention, orientation, fund of knowledge, recent and remote memory, and language.      Assessment:  Problem List Items Addressed This Visit          Mental Health    Generalized anxiety disorder       Neuro    Tic disorder        Plan:  - cont. Quetiapine 25 mg bedtime (lower dose than previously)  - cont. Clonidine ER 0.2 mg bedtime; consider addition of small dose of short acting clonidine in morning if tics worsen or do not improve with decreased stress  - cont. Fluoxetine 40 mg daily         Next appointment: Feb/March 2024      "

## 2023-10-31 ENCOUNTER — TREATMENT (OUTPATIENT)
Dept: PHYSICAL THERAPY | Facility: CLINIC | Age: 23
End: 2023-10-31
Payer: COMMERCIAL

## 2023-10-31 DIAGNOSIS — G89.29 NECK PAIN, CHRONIC: ICD-10-CM

## 2023-10-31 DIAGNOSIS — M25.511 BILATERAL SHOULDER PAIN, UNSPECIFIED CHRONICITY: ICD-10-CM

## 2023-10-31 DIAGNOSIS — M54.2 NECK PAIN, CHRONIC: ICD-10-CM

## 2023-10-31 DIAGNOSIS — M25.512 BILATERAL SHOULDER PAIN, UNSPECIFIED CHRONICITY: ICD-10-CM

## 2023-10-31 LAB — C1Q SERPL-MCNC: 10.3 MG/DL (ref 10.3–20.5)

## 2023-10-31 PROCEDURE — 97110 THERAPEUTIC EXERCISES: CPT | Mod: GP | Performed by: PHYSICAL THERAPIST

## 2023-10-31 PROCEDURE — 97140 MANUAL THERAPY 1/> REGIONS: CPT | Mod: GP | Performed by: PHYSICAL THERAPIST

## 2023-10-31 ASSESSMENT — PAIN SCALES - GENERAL: PAINLEVEL_OUTOF10: 5 - MODERATE PAIN

## 2023-10-31 ASSESSMENT — PAIN - FUNCTIONAL ASSESSMENT: PAIN_FUNCTIONAL_ASSESSMENT: 0-10

## 2023-10-31 NOTE — PROGRESS NOTES
Physical Therapy Treatment    Patient Name: Allyssa Kelly  MRN: [id number]  Today's Date: 10/31/2023  Time Calculation  Start Time: 1600  Stop Time: 1645  Time Calculation (min): 45 min  Current Problem  1. Bilateral shoulder pain, unspecified chronicity  Follow Up In Physical Therapy      2. Neck pain, chronic  Follow Up In Physical Therapy          Insurance:  Number of Treatments Authorized: 2/30          Subjective   General  Reason for Referral: Neck and B shld pain  Referred By: Betsy Boateng  Past Medical History Relevant to Rehab: Lupus  General Comment: Patient states that she feels as though the pain is a little better. Also feels as though it feels looser.    Performing HEP?: Yes    Precautions  Precautions  STEADI Fall Risk Score (The score of 4 or more indicates an increased risk of falling): 0  Precautions Comment: None. PMH reviewed by SL  Pain  Pain Assessment: 0-10  Pain Score: 5 - Moderate pain (In cervical and shoulders. 8/10 L arm)  Pain Type: Chronic pain (aching/dull mainly, occasionally sharp pain)  Pain Location: Shoulder    Objective     General Observation  General Observation: B scapular winging      Treatments:    Therapeutic Exercise  Therapeutic Exercise Performed: Yes  Therapeutic Exercise Activity 1: UBE 6 min fwd  Therapeutic Exercise Activity 2: R UE doorway 2 x 30 sec  Therapeutic Exercise Activity 3: Serratus slide x 10  Therapeutic Exercise Activity 4: Half kneelig thoracic rotation x 10 ea  Therapeutic Exercise Activity 5: Matrix 7.5# row on R x 15  Therapeutic Exercise Activity 6: ER isometric yellow loop 2 x 10  Therapeutic Exercise Activity 7: B shoulder abduction yellow TB 2 x 10  Therapeutic Exercise Activity 8: D2 R in standing yellow Tb 2 x 10         Manual Therapy  Manual Therapy Activity 1: Cervical distraction light  Manual Therapy Activity 2: B UT/lev scap strumming                   OP EDUCATION:       Assessment:  PT Assessment  PT Assessment Results:  Decreased strength, Pain  Assessment Comment: Patient with difficulty with L UE pain therefore most strengthening performed on R. Able to target posterior chain and scapular stabilizers. Noted improvement in tissue mobility in R cervical spine where patient had greatest restriction this date.    Plan:  OP PT Plan  PT Plan: Skilled PT (Cervical retractions for postural corrections, cervical and shld mobility, scap and shld strengthening, STM prn)  Number of Treatments Authorized: 2/30    Goals:  Active       Neck and B shoulder pain       STG       Start:  10/24/23    Expected End:  11/21/23       1) Patient will improve NDI score by 5% to show an improvement in function in 4 weeks.  2) Patient will show an improvement in cervical SB ROM by 5 degrees B in order to allow for improved driving ability in 4 weeks.  3) Patient will be able to perform ADLs without pain exceeding 4/10 on the VAS in 4 weeks.  4) Patient will be independent with HEP in 3 visits in order to allow for improved function with home and community tasks.          LTG       Start:  10/24/23    Expected End:  12/19/23       1) Patient will improve NDI score to </=15% to show an improvement in function at home in 8 weeks.  2) Patient will improve scapular strength to >/=4+/5 in order to reduce compensatory guarding in upper trapezius and greater functional use of upper extremities in 8 weeks.   3) Patient will be able to perform ADLs without pain exceeding 2 /10 on the VAS in 8 weeks.  4) Patient will return to all work related tasks to improve QOL in 8 weeks.   5) Pt will have full shoulder mobility B to improve ADLs and dressing by discharge.               Alfredito Stephens, PT

## 2023-11-02 ENCOUNTER — TREATMENT (OUTPATIENT)
Dept: PHYSICAL THERAPY | Facility: CLINIC | Age: 23
End: 2023-11-02
Payer: COMMERCIAL

## 2023-11-02 DIAGNOSIS — G89.29 NECK PAIN, CHRONIC: ICD-10-CM

## 2023-11-02 DIAGNOSIS — M25.512 BILATERAL SHOULDER PAIN, UNSPECIFIED CHRONICITY: Primary | ICD-10-CM

## 2023-11-02 DIAGNOSIS — M54.2 NECK PAIN, CHRONIC: ICD-10-CM

## 2023-11-02 DIAGNOSIS — M25.511 BILATERAL SHOULDER PAIN, UNSPECIFIED CHRONICITY: Primary | ICD-10-CM

## 2023-11-02 PROCEDURE — 97110 THERAPEUTIC EXERCISES: CPT | Mod: GP,CQ

## 2023-11-02 PROCEDURE — 97140 MANUAL THERAPY 1/> REGIONS: CPT | Mod: GP,CQ

## 2023-11-02 ASSESSMENT — PAIN - FUNCTIONAL ASSESSMENT: PAIN_FUNCTIONAL_ASSESSMENT: 0-10

## 2023-11-02 ASSESSMENT — PAIN SCALES - GENERAL: PAINLEVEL_OUTOF10: 5 - MODERATE PAIN

## 2023-11-02 NOTE — PROGRESS NOTES
"  Physical Therapy Treatment    Patient Name: Allyssa Kelly  MRN: [id number]  Today's Date: 11/2/2023  Time Calculation  Start Time: 0800  Stop Time: 0843  Time Calculation (min): 43 min    Current Problem  1. Bilateral shoulder pain, unspecified chronicity  Follow Up In Physical Therapy      2. Neck pain, chronic  Follow Up In Physical Therapy          Insurance:  Number of Treatments Authorized: 3/30            Subjective   General  Reason for Referral: Neck and B shld pain  Referred By: Betsy Boateng  Past Medical History Relevant to Rehab: Lupus  General Comment: Patient is having surgery on 11/20 for her pincher ulnar nerve.    Performing HEP?: Yes    Precautions  Precautions  STEADI Fall Risk Score (The score of 4 or more indicates an increased risk of falling): 0  Precautions Comment: Medical Health History Form Reviewed    Pain  Pain Assessment: 0-10  Pain Score: 5 - Moderate pain  Pain Type: Chronic pain  Pain Location:  ([place] the neck but some shoulders as well.)    Objective       Treatments:    Therapeutic Exercise  Therapeutic Exercise Activity 1: UBE 6 min fwd  Therapeutic Exercise Activity 2: R UE doorway 2 x 30 sec  Therapeutic Exercise Activity 3: OTD pulleys FF (as whit L) x 2 min  Therapeutic Exercise Activity 4: OTD pulleys for scap add x 2 min  Therapeutic Exercise Activity 5: Serratus slide x 10  Therapeutic Exercise Activity 6: supine punch-up R x 10  Therapeutic Exercise Activity 7: ER isometric yellow loop 2 x 10  Therapeutic Exercise Activity 8: supine cervical retraction x 10  Therapeutic Exercise Activity 9: supine pec stretch 3 x 20\"         Manual Therapy  Manual Therapy Activity 1: Gentle SOR, DTR and cervical distraction  Manual Therapy Activity 2: STM B occipitals, c/t-para, SCM, scalenes, UT, LEV, med scap  Manual Therapy Activity 3: X hand pec release  Manual Therapy Activity 4: stretch B UT, LEV, scalenes                     OP EDUCATION:       Assessment:  PT " Assessment  Assessment Comment: Patient presents with increased pain and rad sx of her L UE due to ulnar nerve entrapment.  Ther ex performed as whit on the L with increased focus on the R.  Responded well to manual activities.    Plan:  OP PT Plan  PT Plan: Skilled PT (Cervical retractions for postural corrections, cervical and shld mobility, scap and shld strengthening, STM prn)  Number of Treatments Authorized: 3/30    Goals:  Active       Neck and B shoulder pain       STG       Start:  10/24/23    Expected End:  11/21/23       1) Patient will improve NDI score by 5% to show an improvement in function in 4 weeks.  2) Patient will show an improvement in cervical SB ROM by 5 degrees B in order to allow for improved driving ability in 4 weeks.  3) Patient will be able to perform ADLs without pain exceeding 4/10 on the VAS in 4 weeks.  4) Patient will be independent with HEP in 3 visits in order to allow for improved function with home and community tasks.          LTG       Start:  10/24/23    Expected End:  12/19/23       1) Patient will improve NDI score to </=15% to show an improvement in function at home in 8 weeks.  2) Patient will improve scapular strength to >/=4+/5 in order to reduce compensatory guarding in upper trapezius and greater functional use of upper extremities in 8 weeks.   3) Patient will be able to perform ADLs without pain exceeding 2 /10 on the VAS in 8 weeks.  4) Patient will return to all work related tasks to improve QOL in 8 weeks.   5) Pt will have full shoulder mobility B to improve ADLs and dressing by discharge.               Marlene Mcdermott, PTA

## 2023-11-07 ENCOUNTER — TREATMENT (OUTPATIENT)
Dept: PHYSICAL THERAPY | Facility: CLINIC | Age: 23
End: 2023-11-07
Payer: COMMERCIAL

## 2023-11-07 DIAGNOSIS — M54.2 NECK PAIN, CHRONIC: Primary | ICD-10-CM

## 2023-11-07 DIAGNOSIS — M25.512 BILATERAL SHOULDER PAIN, UNSPECIFIED CHRONICITY: ICD-10-CM

## 2023-11-07 DIAGNOSIS — G89.29 NECK PAIN, CHRONIC: Primary | ICD-10-CM

## 2023-11-07 DIAGNOSIS — M25.511 BILATERAL SHOULDER PAIN, UNSPECIFIED CHRONICITY: ICD-10-CM

## 2023-11-07 PROCEDURE — 97140 MANUAL THERAPY 1/> REGIONS: CPT | Mod: GP | Performed by: PHYSICAL THERAPIST

## 2023-11-07 PROCEDURE — 97110 THERAPEUTIC EXERCISES: CPT | Mod: GP | Performed by: PHYSICAL THERAPIST

## 2023-11-07 ASSESSMENT — PAIN - FUNCTIONAL ASSESSMENT: PAIN_FUNCTIONAL_ASSESSMENT: 0-10

## 2023-11-07 ASSESSMENT — PAIN SCALES - GENERAL: PAINLEVEL_OUTOF10: 5 - MODERATE PAIN

## 2023-11-07 NOTE — PROGRESS NOTES
Physical Therapy Treatment    Patient Name: Allyssa Kelly  MRN: [id number]  Today's Date: 11/7/2023  Time Calculation  Start Time: 0833  Stop Time: 0917  Time Calculation (min): 44 min  Current Problem  1. Neck pain, chronic  Follow Up In Physical Therapy      2. Bilateral shoulder pain, unspecified chronicity  Follow Up In Physical Therapy          Insurance:  Number of Treatments Authorized: 4/30          Subjective   General  Reason for Referral: Neck and B shld pain  Referred By: Betsy Boateng  Past Medical History Relevant to Rehab: Lupus  General Comment: Pt reports she's doing well with her exercises. Getting ulnar decompression      Performing HEP?: Yes    Precautions  Precautions  STEADI Fall Risk Score (The score of 4 or more indicates an increased risk of falling): 0  Precautions Comment: none  Pain  Pain Assessment: 0-10  Pain Score: 5 - Moderate pain  Pain Type: Chronic pain  Pain Location:  (Neck/shlds)    Objective   (+) pain at end range rotation cervical B       Treatments:    Therapeutic Exercise  Therapeutic Exercise Activity 1: UBE F/B x 5 minutes  Therapeutic Exercise Activity 2: Cervical retractions x 1 min  Therapeutic Exercise Activity 3: R/L shld crawl up wall red swiss ball x 1 min  Therapeutic Exercise Activity 4: Serratus slide at wall x 1 min  Therapeutic Exercise Activity 5: R scaption wall slide + lift off x 1 min  Therapeutic Exercise Activity 6: B wall push up (R>L) x 1 min  Therapeutic Exercise Activity 7: Serratus push up plus at wall x 1 min  Therapeutic Exercise Activity 8: Supine chin tucks x 1 min  Therapeutic Exercise Activity 9: Cervical AROM x 1 min  Therapeutic Exercise Activity 10: HEP review         Manual Therapy  Manual Therapy Activity 1: Gentle SOR, DTR and cervical distraction  Manual Therapy Activity 2: STM B occipitals, c/t-para, SCM, scalenes, UT, LEV, med scap  Manual Therapy Activity 3: X hand pec release      Assessment:  PT Assessment  Assessment  Comment: Pt showing good progress with neck and shld mobility. Limited with strength training for L UE d/t ulnar nerve symptoms. Pain relief with R>L lev scap, UT and suboccipital release.    Plan:  OP PT Plan  PT Plan: Skilled PT (Cervical retractions for postural corrections, cervical and shld mobility, scap and shld strengthening, STM prn)  PT Frequency:  (PT until 11/28, then likely d/c d/t ulnar nerve decompression surgery planned)  Number of Treatments Authorized: 4/30    Goals:  Active       Neck and B shoulder pain       STG (Progressing)       Start:  10/24/23    Expected End:  11/21/23       1) Patient will improve NDI score by 5% to show an improvement in function in 4 weeks.  2) Patient will show an improvement in cervical SB ROM by 5 degrees B in order to allow for improved driving ability in 4 weeks.  3) Patient will be able to perform ADLs without pain exceeding 4/10 on the VAS in 4 weeks.  4) Patient will be independent with HEP in 3 visits in order to allow for improved function with home and community tasks.          LTG (Progressing)       Start:  10/24/23    Expected End:  12/19/23       1) Patient will improve NDI score to </=15% to show an improvement in function at home in 8 weeks.  2) Patient will improve scapular strength to >/=4+/5 in order to reduce compensatory guarding in upper trapezius and greater functional use of upper extremities in 8 weeks.   3) Patient will be able to perform ADLs without pain exceeding 2 /10 on the VAS in 8 weeks.  4) Patient will return to all work related tasks to improve QOL in 8 weeks.   5) Pt will have full shoulder mobility B to improve ADLs and dressing by discharge.               Tea Benedict, PT

## 2023-11-09 ENCOUNTER — TREATMENT (OUTPATIENT)
Dept: PHYSICAL THERAPY | Facility: CLINIC | Age: 23
End: 2023-11-09
Payer: COMMERCIAL

## 2023-11-09 DIAGNOSIS — M25.512 BILATERAL SHOULDER PAIN, UNSPECIFIED CHRONICITY: ICD-10-CM

## 2023-11-09 DIAGNOSIS — M54.2 NECK PAIN, CHRONIC: ICD-10-CM

## 2023-11-09 DIAGNOSIS — G89.29 NECK PAIN, CHRONIC: ICD-10-CM

## 2023-11-09 DIAGNOSIS — M25.511 BILATERAL SHOULDER PAIN, UNSPECIFIED CHRONICITY: ICD-10-CM

## 2023-11-09 PROCEDURE — 97110 THERAPEUTIC EXERCISES: CPT | Mod: GP | Performed by: PHYSICAL THERAPIST

## 2023-11-09 PROCEDURE — 97140 MANUAL THERAPY 1/> REGIONS: CPT | Mod: GP | Performed by: PHYSICAL THERAPIST

## 2023-11-09 ASSESSMENT — PAIN SCALES - GENERAL: PAINLEVEL_OUTOF10: 4

## 2023-11-09 ASSESSMENT — PAIN - FUNCTIONAL ASSESSMENT: PAIN_FUNCTIONAL_ASSESSMENT: 0-10

## 2023-11-09 NOTE — PROGRESS NOTES
Physical Therapy Treatment    Patient Name: Allyssa Kelly  MRN: [id number]  Today's Date: 11/9/2023  Time Calculation  Start Time: 0834  Stop Time: 0917  Time Calculation (min): 43 min  Current Problem  1. Bilateral shoulder pain, unspecified chronicity  Follow Up In Physical Therapy      2. Neck pain, chronic  Follow Up In Physical Therapy          Insurance:  Number of Treatments Authorized: 5/30          Subjective   General  Reason for Referral: Neck and B shld pain  Referred By: Betsy Boateng  Past Medical History Relevant to Rehab: Lupus  General Comment: Pt reports not too much soreness from last session. Doing well. No increased L elbow pain from last session.    Performing HEP?: Yes    Precautions  Precautions  STEADI Fall Risk Score (The score of 4 or more indicates an increased risk of falling): 0  Precautions Comment: none  Pain  Pain Assessment: 0-10  Pain Score: 4  Pain Type: Chronic pain  Pain Location:  (Neck/shlds)    Objective   (+) B lev scap tenderness  Limited L UE use during session to protect elbow     Treatments:    Therapeutic Exercise  Therapeutic Exercise Activity 1: UBE F/B 3'/3' x 6 minutes  Therapeutic Exercise Activity 2: R/L shld crawl up wall red swiss ball x 1 min  Therapeutic Exercise Activity 3: Purple perform better band: L UE stabilizing, R UE rows x 1 min  Therapeutic Exercise Activity 4: Purple perform better band: L UE stabilizing, R UE shld extension x 1 min  Therapeutic Exercise Activity 5: Purple perform better band: L UE stabilizing, R UE face pulls x 1 min  Therapeutic Exercise Activity 6: Purple perform better band: L UE stabilizing, R UE post fly x 1 min  Therapeutic Exercise Activity 7: Purple perform better band R ER x 1 min  Therapeutic Exercise Activity 8: R thoracic rotation open book at wall x 1 min  Therapeutic Exercise Activity 9: B supine serratus punches 2# dowel x 1 min  Therapeutic Exercise Activity 10: Supine AROM elevation 2# dowel x 1 min          Manual Therapy  Manual Therapy Activity 1: Gentle SOR and cervical distraction  Manual Therapy Activity 2: STM B occipitals, c/t-para, SCM, scalenes, UT, LEV, med scap B  Manual Therapy Activity 3: Pect release R/L        OP EDUCATION:  Education  Education Comment: Continue with current HEP    Assessment:  PT Assessment  Assessment Comment: Pt showing improved neck and shld mobility this date. Limited with L UE strengthening d/t elbow, but performing well with R UE. Pain relief with manual to L>R cervical region and UT/LS.    Plan:  OP PT Plan  PT Plan: Skilled PT (Cervical retractions for postural corrections, cervical and shld mobility, scap and shld strengthening, STM prn)  PT Frequency:  (PT until 11/28, then likely d/c d/t ulnar nerve decompression surgery planned)  Number of Treatments Authorized: 5/30    Goals:  Active       Neck and B shoulder pain       STG (Progressing)       Start:  10/24/23    Expected End:  11/21/23       1) Patient will improve NDI score by 5% to show an improvement in function in 4 weeks.  2) Patient will show an improvement in cervical SB ROM by 5 degrees B in order to allow for improved driving ability in 4 weeks.  3) Patient will be able to perform ADLs without pain exceeding 4/10 on the VAS in 4 weeks.  4) Patient will be independent with HEP in 3 visits in order to allow for improved function with home and community tasks.          LTG (Progressing)       Start:  10/24/23    Expected End:  12/19/23       1) Patient will improve NDI score to </=15% to show an improvement in function at home in 8 weeks.  2) Patient will improve scapular strength to >/=4+/5 in order to reduce compensatory guarding in upper trapezius and greater functional use of upper extremities in 8 weeks.   3) Patient will be able to perform ADLs without pain exceeding 2 /10 on the VAS in 8 weeks.  4) Patient will return to all work related tasks to improve QOL in 8 weeks.   5) Pt will have full shoulder  mobility B to improve ADLs and dressing by discharge.               Tea Benedict, PT

## 2023-11-15 ENCOUNTER — TELEPHONE (OUTPATIENT)
Dept: RHEUMATOLOGY | Facility: CLINIC | Age: 23
End: 2023-11-15
Payer: COMMERCIAL

## 2023-11-15 DIAGNOSIS — L93.2 CUTANEOUS LUPUS ERYTHEMATOSUS: Primary | ICD-10-CM

## 2023-11-15 RX ORDER — FOLIC ACID 1 MG/1
1 TABLET ORAL DAILY
Qty: 30 TABLET | Refills: 11 | Status: SHIPPED | OUTPATIENT
Start: 2023-11-15 | End: 2023-12-18 | Stop reason: SDUPTHER

## 2023-11-15 RX ORDER — METHOTREXATE 2.5 MG/1
10 TABLET ORAL
Qty: 16 TABLET | Refills: 4 | Status: SHIPPED | OUTPATIENT
Start: 2023-11-15 | End: 2023-12-18 | Stop reason: SDUPTHER

## 2023-11-15 NOTE — TELEPHONE ENCOUNTER
I put in methotrexate 2.5 mg 4 p.o. once a week and folic acid 1 mg daily every day except the day she takes methotrexate.

## 2023-11-16 ENCOUNTER — TREATMENT (OUTPATIENT)
Dept: PHYSICAL THERAPY | Facility: CLINIC | Age: 23
End: 2023-11-16
Payer: COMMERCIAL

## 2023-11-16 DIAGNOSIS — M54.2 NECK PAIN, CHRONIC: ICD-10-CM

## 2023-11-16 DIAGNOSIS — M25.511 BILATERAL SHOULDER PAIN, UNSPECIFIED CHRONICITY: Primary | ICD-10-CM

## 2023-11-16 DIAGNOSIS — G89.29 NECK PAIN, CHRONIC: ICD-10-CM

## 2023-11-16 DIAGNOSIS — M25.512 BILATERAL SHOULDER PAIN, UNSPECIFIED CHRONICITY: Primary | ICD-10-CM

## 2023-11-16 PROCEDURE — 97110 THERAPEUTIC EXERCISES: CPT | Mod: GP,CQ

## 2023-11-16 PROCEDURE — 97140 MANUAL THERAPY 1/> REGIONS: CPT | Mod: GP,CQ

## 2023-11-16 ASSESSMENT — PAIN - FUNCTIONAL ASSESSMENT: PAIN_FUNCTIONAL_ASSESSMENT: 0-10

## 2023-11-16 ASSESSMENT — PAIN SCALES - GENERAL: PAINLEVEL_OUTOF10: 6

## 2023-11-16 NOTE — PROGRESS NOTES
Physical Therapy Treatment    Patient Name: Allyssa Kelly  MRN: [id number]  Today's Date: 11/16/2023  Time Calculation  Start Time: 0758  Stop Time: 0846  Time Calculation (min): 48 min    Current Problem  1. Bilateral shoulder pain, unspecified chronicity  Follow Up In Physical Therapy      2. Neck pain, chronic  Follow Up In Physical Therapy          Insurance:  Number of Treatments Authorized: 6/30            Subjective   General  Reason for Referral: Neck and B shld pain  Referred By: Betsy Boateng  Past Medical History Relevant to Rehab: Lupus  General Comment: Patient states her neck is pretty sore and paianful.  She has decreased cervical ROM.  She tries to perfrom cervical retraction and ROM during the day.  Her sx increase as the day progresses.  She is going to [place] for a few days then is scheduled for ulnar nerve decompresion the following week.  Patient notes she has increased shoulder ROM.    Performing HEP?: Yes    Precautions  Precautions  STEADI Fall Risk Score (The score of 4 or more indicates an increased risk of falling): 0  Precautions Comment: none  Pain  Pain Assessment: 0-10  Pain Score: 6  Pain Type: Chronic pain    Objective       Treatments:      Therapeutic Exercise  Therapeutic Exercise Activity 1: UBE F/B 3'/3' x 6 minutes  Therapeutic Exercise Activity 2: R/L shld crawl up wall red swiss ball x 1 min  Therapeutic Exercise Activity 3: Purple perform better band: L UE stabilizing, R UE rows x 1 min  Therapeutic Exercise Activity 4: Purple perform better band: L UE stabilizing, R UE shld extension x 1 min  Therapeutic Exercise Activity 5: Purple perform better band: L UE stabilizing, R UE face pulls x 1 min  Therapeutic Exercise Activity 6: Purple perform better band: L UE stabilizing, R UE post fly x 1 min  Therapeutic Exercise Activity 7: Purple perform better band: L UE stabilizing, R ER x 1 min  Therapeutic Exercise Activity 8: R thoracic rotation open book at wall x 1  min  Therapeutic Exercise Activity 9: sitting with lumbar roll cervical retraction x 10  Therapeutic Exercise Activity 10: sitting with lumbar roll cervical rotation  Therapeutic Exercise Activity 11: sitting with lumbar roll cervical nodding  Therapeutic Exercise Activity 12: B supine serratus punches 2# dowel x 1 min  Therapeutic Exercise Activity 13: Supine AROM elevation 2# dowel x 1 min         Manual Therapy  Manual Therapy Activity 1: Manual SOR and cervical distraction  Manual Therapy Activity 2: STM B occipital, c-para, scalenes, SCM, UT. LEV  Manual Therapy Activity 3: X hand pec release  Manual Therapy Activity 4: cervical PROM                     OP EDUCATION:  Outpatient Education  Education Comment: Continue with current HEP    Assessment:  PT Assessment  Assessment Comment: Patient experiences limiter cervical AROM with ERP.  Her mobility improved with manual activities.  Tolerated ther ex well with limitations.    Plan:  OP PT Plan  PT Plan: Skilled PT (Cervical retractions for postural corrections, cervical and shld mobility, scap and shld strengthening, STM prn)  PT Frequency:  (PT until 11/28, then likely d/c d/t ulnar nerve decompression surgery planned)  Number of Treatments Authorized: 6/30    Goals:  Active       Neck and B shoulder pain       STG (Progressing)       Start:  10/24/23    Expected End:  11/21/23       1) Patient will improve NDI score by 5% to show an improvement in function in 4 weeks.  2) Patient will show an improvement in cervical SB ROM by 5 degrees B in order to allow for improved driving ability in 4 weeks.  3) Patient will be able to perform ADLs without pain exceeding 4/10 on the VAS in 4 weeks.  4) Patient will be independent with HEP in 3 visits in order to allow for improved function with home and community tasks.          LTG (Progressing)       Start:  10/24/23    Expected End:  12/19/23       1) Patient will improve NDI score to </=15% to show an improvement in  function at home in 8 weeks.  2) Patient will improve scapular strength to >/=4+/5 in order to reduce compensatory guarding in upper trapezius and greater functional use of upper extremities in 8 weeks.   3) Patient will be able to perform ADLs without pain exceeding 2 /10 on the VAS in 8 weeks.  4) Patient will return to all work related tasks to improve QOL in 8 weeks.   5) Pt will have full shoulder mobility B to improve ADLs and dressing by discharge.               Marlene Mcdermott, PTA

## 2023-11-28 ENCOUNTER — TREATMENT (OUTPATIENT)
Dept: PHYSICAL THERAPY | Facility: CLINIC | Age: 23
End: 2023-11-28
Payer: COMMERCIAL

## 2023-11-28 ENCOUNTER — PRE-ADMISSION TESTING (OUTPATIENT)
Dept: PREADMISSION TESTING | Facility: HOSPITAL | Age: 23
End: 2023-11-28
Payer: COMMERCIAL

## 2023-11-28 ENCOUNTER — ANESTHESIA EVENT (OUTPATIENT)
Dept: OPERATING ROOM | Facility: HOSPITAL | Age: 23
End: 2023-11-28
Payer: COMMERCIAL

## 2023-11-28 VITALS
DIASTOLIC BLOOD PRESSURE: 65 MMHG | BODY MASS INDEX: 24.69 KG/M2 | RESPIRATION RATE: 16 BRPM | OXYGEN SATURATION: 100 % | HEART RATE: 76 BPM | SYSTOLIC BLOOD PRESSURE: 118 MMHG | HEIGHT: 69 IN | TEMPERATURE: 97 F | WEIGHT: 166.67 LBS

## 2023-11-28 DIAGNOSIS — G89.29 NECK PAIN, CHRONIC: ICD-10-CM

## 2023-11-28 DIAGNOSIS — M25.512 BILATERAL SHOULDER PAIN, UNSPECIFIED CHRONICITY: ICD-10-CM

## 2023-11-28 DIAGNOSIS — M54.2 NECK PAIN, CHRONIC: ICD-10-CM

## 2023-11-28 DIAGNOSIS — M25.511 BILATERAL SHOULDER PAIN, UNSPECIFIED CHRONICITY: ICD-10-CM

## 2023-11-28 PROCEDURE — 99204 OFFICE O/P NEW MOD 45 MIN: CPT | Performed by: NURSE PRACTITIONER

## 2023-11-28 PROCEDURE — 97110 THERAPEUTIC EXERCISES: CPT | Mod: GP | Performed by: PHYSICAL THERAPIST

## 2023-11-28 PROCEDURE — 97140 MANUAL THERAPY 1/> REGIONS: CPT | Mod: GP | Performed by: PHYSICAL THERAPIST

## 2023-11-28 ASSESSMENT — CHADS2 SCORE
CHADS2 SCORE: 0
CHF: NO
HYPERTENSION: NO
PRIOR STROKE OR TIA OR THROMBOEMBOLISM: NO
AGE GREATER THAN OR EQUAL TO 75: NO
DIABETES: NO

## 2023-11-28 ASSESSMENT — PAIN - FUNCTIONAL ASSESSMENT
PAIN_FUNCTIONAL_ASSESSMENT: 0-10
PAIN_FUNCTIONAL_ASSESSMENT: 0-10

## 2023-11-28 ASSESSMENT — PAIN DESCRIPTION - DESCRIPTORS
DESCRIPTORS: ACHING
DESCRIPTORS: SHARP

## 2023-11-28 ASSESSMENT — DUKE ACTIVITY SCORE INDEX (DASI)
CAN YOU PARTICIPATE IN STRENOUS SPORTS LIKE SWIMMING, SINGLES TENNIS, FOOTBALL, BASKETBALL, OR SKIING: YES
CAN YOU DO MODERATE WORK AROUND THE HOUSE LIKE VACUUMING, SWEEPING FLOORS OR CARRYING GROCERIES: YES
CAN YOU WALK INDOORS, SUCH AS AROUND YOUR HOUSE: YES
CAN YOU DO HEAVY WORK AROUND THE HOUSE LIKE SCRUBBING FLOORS OR LIFTING AND MOVING HEAVY FURNITURE: YES
TOTAL_SCORE: 58.2
CAN YOU DO LIGHT WORK AROUND THE HOUSE LIKE DUSTING OR WASHING DISHES: YES
CAN YOU HAVE SEXUAL RELATIONS: YES
CAN YOU DO YARD WORK LIKE RAKING LEAVES, WEEDING OR PUSHING A MOWER: YES
CAN YOU PARTICIPATE IN MODERATE RECREATIONAL ACTIVITIES LIKE GOLF, BOWLING, DANCING, DOUBLES TENNIS OR THROWING A BASEBALL OR FOOTBALL: YES
CAN YOU WALK A BLOCK OR TWO ON LEVEL GROUND: YES
CAN YOU TAKE CARE OF YOURSELF (EAT, DRESS, BATHE, OR USE TOILET): YES
DASI METS SCORE: 9.9
CAN YOU CLIMB A FLIGHT OF STAIRS OR WALK UP A HILL: YES
CAN YOU RUN A SHORT DISTANCE: YES

## 2023-11-28 ASSESSMENT — ENCOUNTER SYMPTOMS
ARTHRALGIAS: 1
NEUROLOGICAL NEGATIVE: 1
GASTROINTESTINAL NEGATIVE: 1
CARDIOVASCULAR NEGATIVE: 1
ACTIVITY CHANGE: 1
RESPIRATORY NEGATIVE: 1
PSYCHIATRIC NEGATIVE: 1

## 2023-11-28 ASSESSMENT — PAIN SCALES - GENERAL
PAINLEVEL_OUTOF10: 5 - MODERATE PAIN
PAINLEVEL_OUTOF10: 5 - MODERATE PAIN
PAINLEVEL_OUTOF10: 3

## 2023-11-28 NOTE — CPM/PAT H&P
CPM/PAT Evaluation       Name: Allyssa Kelly (Allyssa Kelly)  /Age: 2000/23 y.o.     In-Person       Chief Complaint: Lesion of left ulnar nerve    HPI  A 23-year-old female with lesion of left ulnar nerve.  History of left wrist injury approximately 6 years ago status post left wrist surgery .  She has had progressive left radiating elbow pain down her left arm with associated intermittent left hand small and  ring finger tingling and weakness for the past 2 years.  Symptoms increased with grabbing or gripping motions interfering with sleep, work, and ADLs. Conservative treatments not helping.  She is scheduled for ulnar left elbow nerve decompression with possible transposition.    Past Medical History:   Diagnosis Date    Acute pharyngitis, unspecified 2014    Sore throat    Acute pharyngitis, unspecified 2017    Sore throat    Acute tonsillitis, unspecified 2021    Acute tonsillitis, unspecified    Allergy, unspecified, initial encounter 10/08/2021    Hypersensitivity    Anxiety     Arthralgia of temporomandibular joint, unspecified side 2014    Temporomandibular joint pain    Body mass index (BMI) 19.9 or less, adult 2021    Body mass index (BMI) of 19.9 or less in adult    Body mass index (BMI) pediatric, 5th percentile to less than 85th percentile for age 2021    BMI (body mass index), pediatric, 5% to less than 85% for age    Calculus of kidney with calculus of ureter 2022    Calculus of kidney with calculus of ureter    Candidiasis of skin and nail 2020    Candidal skin infection    Cellulitis of left toe 2018    Cellulitis of fifth toe of left foot    Contact with and (suspected) exposure to other viral communicable diseases 2020    Exposure to influenza    COVID-19 2020    COVID-19 virus infection    Displaced fracture of shaft of third metacarpal bone, left hand, initial encounter for closed fracture 2016     Closed displaced fracture of shaft of third metacarpal bone of left hand, initial encounter    Encounter for general adult medical examination without abnormal findings 09/17/2021    Examination, routine, over 18 years of age    Encounter for pre-employment examination 09/17/2021    Encounter for pre-employment health screening examination    Encounter for routine child health examination without abnormal findings 10/03/2018    Encounter for routine child health examination without abnormal findings    Hordeolum externum right upper eyelid 11/19/2021    Hordeolum externum of right upper eyelid    Jaw pain 10/07/2020    Chronic jaw pain    Joint derangement, unspecified 10/15/2021    Hypermobility of joint    Local infection of the skin and subcutaneous tissue, unspecified 04/26/2022    Infection of skin of finger    Localized swelling, mass and lump, head 04/28/2017    Tongue swelling    Nonscarring hair loss, unspecified 10/03/2018    Hair thinning    Other chronic diseases of tonsils and adenoids 04/28/2017    Cryptic tonsil    Other conditions influencing health status 11/18/2021    Encounter for procedure    Other diseases of tongue 04/28/2017    Tongue lesion    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    Other specified respiratory disorders 02/18/2019    Viral respiratory illness    Pain in left arm 11/22/2021    Pain of left upper extremity    Pain in right ankle and joints of right foot 09/23/2021    Right ankle pain    Pain in right wrist 06/08/2016    Right wrist pain    Pain in unspecified knee 09/30/2014    Joint pain, knee    Pain in unspecified shoulder 10/27/2014    Shoulder pain    Pain in unspecified wrist 11/22/2021    Chronic wrist pain    Panic attacks     Personal history of diseases of the skin and subcutaneous tissue 08/12/2017    History of telogen effluvium    Personal history of diseases of the skin and subcutaneous tissue 12/30/2020    History of  pilonidal cyst    Personal history of diseases of the skin and subcutaneous tissue 04/06/2015    History of contact dermatitis    Personal history of other (healed) physical injury and trauma 11/19/2018    History of insect bite    Personal history of other diseases of the digestive system 02/13/2020    History of gastroenteritis    Personal history of other diseases of the female genital tract 08/12/2017    History of dysmenorrhea    Personal history of other diseases of the musculoskeletal system and connective tissue 12/14/2017    History of low back pain    Personal history of other diseases of the musculoskeletal system and connective tissue 05/10/2022    History of muscle pain    Personal history of other diseases of the respiratory system 11/19/2021    History of sore throat    Personal history of other diseases of the respiratory system 01/17/2017    History of acute sinusitis    Personal history of other diseases of the respiratory system 02/18/2019    History of sore throat    Personal history of other diseases of the respiratory system 07/21/2014    History of pharyngitis    Personal history of other diseases of the respiratory system 01/07/2015    History of influenza    Personal history of other diseases of the respiratory system 01/17/2017    History of acute sinusitis    Personal history of other diseases of urinary system 02/12/2022    History of hematuria    Personal history of other infectious and parasitic diseases 07/21/2014    History of viral infection    Personal history of other specified conditions 02/12/2022    History of dysuria    Personal history of other specified conditions 02/12/2022    History of dysuria    Personal history of other specified conditions 09/20/2021    History of insomnia    Personal history of other specified conditions 10/15/2021    History of urinary urgency    Personal history of other specified conditions 10/15/2021    History of urinary frequency    Personal  history of other specified conditions 04/05/2021    History of weight loss    Personal history of other specified conditions 02/18/2019    History of fever    Personal history of other specified conditions 03/15/2021    History of nasal congestion    Personal history of other specified conditions 02/21/2020    History of diarrhea    Personal history of other specified conditions 12/01/2016    History of abdominal pain    Personal history of other specified conditions 01/07/2015    History of vomiting    Personal history of other specified conditions 01/07/2015    History of fever    PONV (postoperative nausea and vomiting)     WEARS PATCH    Premenstrual dysphoric disorder 01/25/2016    PMDD (premenstrual dysphoric disorder)    Raised antibody titer 02/24/2020    Elevated EBV antibody titer    Right lower quadrant pain 02/21/2020    Bilateral lower abdominal pain    Sprain of interphalangeal joint of right middle finger, initial encounter 03/06/2018    Sprain of interphalangeal joint of right middle finger, initial encounter    Strain of unspecified muscle(s) and tendon(s) at lower leg level, left leg, initial encounter 12/10/2018    Strain of left knee    Superficial mycosis, unspecified 10/17/2015    Fungal rash of trunk    Tic disorder, unspecified 11/01/2021    Tic disorder    TMJ (dislocation of temporomandibular joint)     Unspecified abdominal pain 12/01/2016    Abdominal pain, acute    Unspecified acute conjunctivitis, left eye 12/01/2016    Acute conjunctivitis, left eye    Unspecified conjunctivitis 11/13/2015    Conjunctivitis, left eye    Unspecified fracture of left wrist and hand, initial encounter for closed fracture 12/29/2016    Fracture of left hand, closed, initial encounter    Unspecified injury of left wrist, hand and finger(s), initial encounter 12/03/2016    Injury of left hand, initial encounter    Unspecified injury of right lower leg, initial encounter 12/07/2015    Right knee injury     Unspecified injury of right wrist, hand and finger(s), initial encounter 03/28/2017    Injury of right hand, initial encounter    Unspecified injury of right wrist, hand and finger(s), initial encounter 03/06/2018    Injury of finger of right hand, initial encounter    Unspecified injury of unspecified foot, initial encounter 05/02/2015    Foot injury    Unspecified injury of unspecified wrist, hand and finger(s), initial encounter     Finger injury    Unspecified renal colic 02/12/2022    Ureteral colic    Vomiting, unspecified 02/25/2020    Intermittent vomiting       Past Surgical History:   Procedure Laterality Date    OTHER SURGICAL HISTORY  12/11/2020    Foot surgery    OTHER SURGICAL HISTORY  10/19/2020    Wrist surgery    OTHER SURGICAL HISTORY  03/25/2022    Temporomandibular joint surgery    OTHER SURGICAL HISTORY  09/16/2020    Surgery    OTHER SURGICAL HISTORY  05/07/2021    Arthrocentesis (Therapeutic)         Allergies   Allergen Reactions    Tretinoin Rash       Current Outpatient Medications   Medication Sig Dispense Refill    ascorbic acid, vitamin C, 500 mg capsule Take 1 tablet by mouth once daily. TAKE PER DIRECTED      B complex-vitamin C-folic acid (Nephro-Harley Rx) 1- mg-mg-mcg tablet Take 1 tablet by mouth. TAKE PER DIRECTED      cholecalciferol (Vitamin D-3) 25 MCG (1000 UT) capsule Take 1 capsule (25 mcg) by mouth once daily. TAKE PER DIRECTED      cloNIDine ER (Kapvay) 0.1 mg tablet extended release 12 hr Take 2 tablets (0.2 mg) by mouth once daily at bedtime. 180 tablet 2    FLUoxetine (PROzac) 40 mg capsule TAKE 1 CAPSULE BY MOUTH ONCE DAILY 90 capsule 2    folic acid (Folvite) 1 mg tablet Take 1 tablet (1 mg) by mouth once daily. (Patient taking differently: Take 1 tablet (1 mg) by mouth once daily. 6 DAYS PER WEEK. NOT TO TAKE ON DAY TAKES FOLIC ACID) 30 tablet 11    methotrexate (Trexall) 2.5 mg tablet Take 4 tablets (10 mg total) by mouth 1 (one) time per week. 16 tablet 4     "MULTIVITAMIN-FERROUS FUMARATE-FOLIC ACID 9 MG-200 MCG TABLET, HALF TABLET, (Centrum) Take 1 half tablet by mouth once daily.      QUEtiapine (SEROquel) 50 mg tablet Take 0.5 tablets (25 mg) by mouth once daily at bedtime. 45 tablet 2    rizatriptan (Maxalt) 10 mg tablet Take 1 tablet (10 mg) by mouth once daily as needed for migraine.       No current facility-administered medications for this visit.     Review of Systems   Constitutional:  Positive for activity change.        Left elbow pain   HENT: Negative.     Respiratory: Negative.     Cardiovascular: Negative.    Gastrointestinal: Negative.    Endocrine:        Newly diagnosed lupus   Genitourinary: Negative.    Musculoskeletal:  Positive for arthralgias (chronic neck and shoulder pain).   Skin: Negative.    Neurological: Negative.    Psychiatric/Behavioral: Negative.          Physical Exam  Vitals reviewed.   Constitutional:       Appearance: Normal appearance.   HENT:      Head: Normocephalic and atraumatic.      Mouth/Throat:      Mouth: Mucous membranes are moist.   Eyes:      Pupils: Pupils are equal, round, and reactive to light.      Comments: Glasses/contacts   Cardiovascular:      Rate and Rhythm: Normal rate and regular rhythm.   Pulmonary:      Effort: Pulmonary effort is normal.      Breath sounds: Normal breath sounds.   Abdominal:      Palpations: Abdomen is soft.   Musculoskeletal:         General: Normal range of motion.      Cervical back: Normal range of motion.   Skin:     General: Skin is warm and dry.   Neurological:      Mental Status: She is alert and oriented to person, place, and time.   Psychiatric:         Mood and Affect: Mood normal.          PAT AIRWAY:   Airway:     Mallampati::  II    Neck ROM::  Full  normal        /65   Pulse 76   Temp 36.1 °C (97 °F) (Temporal)   Resp 16   Ht 1.753 m (5' 9\")   Wt 75.6 kg (166 lb 10.7 oz)   LMP 10/27/2023 (Exact Date)   SpO2 100%   BMI 24.61 kg/m²       Stop Bang Score      CHADS " 2 score: 1.9%  DASI score: 58.2  METS score: 9.9  Revised cardiac risk index: 0.4%  ASA II  ARISCAT 1.6%      Assessment and Plan:     Lesion of left ulnar nerve Plan: Ulnar left elbow nerve decompression with possible transposition.  Lupus-newer diagnosis  Chronic pain-neck and shoulder  Anxiety

## 2023-11-28 NOTE — PROGRESS NOTES
Physical Therapy Treatment/Discharge    Patient Name: Allyssa Kelly  MRN: [id number]  Today's Date: 11/28/2023  Time Calculation  Start Time: 1115  Stop Time: 1200  Time Calculation (min): 45 min  Current Problem  1. Bilateral shoulder pain, unspecified chronicity  Follow Up In Physical Therapy      2. Neck pain, chronic  Follow Up In Physical Therapy          Insurance:  Payor:  EMPLOYEE MEDICAL PLAN / Plan:  EMPLOYEE MEDICAL PLAN TRADITIONAL  / Product Type: *No Product type* /   Number of Treatments Authorized: 7/30          Subjective   General  Reason for Referral: Neck and B shld pain  Referred By: Betsy Boateng  Past Medical History Relevant to Rehab: Lupus  General Comment: Patient states that towards the end of last week into this week she has had difficulty with sleeping through the night due to neck pain.    Performing HEP?: Yes    Precautions  Precautions  STEADI Fall Risk Score (The score of 4 or more indicates an increased risk of falling): 0  Precautions Comment: none  Pain  Pain Assessment: 0-10  Pain Score: 5 - Moderate pain  Pain Type: Chronic pain  Pain Location:  (Neck/shoulders)    Objective       Treatments:    Therapeutic Exercise  Therapeutic Exercise Activity 1: UBE F/B 3'/3' x 6 minutes  Therapeutic Exercise Activity 2: R/L shld crawl up wall red swiss ball x 1 min  Therapeutic Exercise Activity 3: Purple PB shoulder extension B with ER and IR 2 x 10 ea  Therapeutic Exercise Activity 4: Purple PB row 2 x 10  Therapeutic Exercise Activity 5: Purple PB ER with pause 2 x 10 ea  Therapeutic Exercise Activity 6: Purple PB IR 2 x 10 ea  Therapeutic Exercise Activity 7: SL thoracic rotation x 15 ea         Manual Therapy  Manual Therapy Activity 1: Manual SOR and cervical distraction  Manual Therapy Activity 2: STM B occipital, c-para, scalenes, SCM, UT. LEV  Manual Therapy Activity 3: cervical PROM      Assessment:  PT Assessment  Assessment Comment: Patient has attended 7 physical  therapy visits for cervical spine pain and bilateral shoulder pain. Patient is seen good improvement in overall pain level still continues to have increased pain on a day-to-day basis. However due to her receiving an ulnar nerve release next week she will be discharged from therapy at this time. Patient instructed to return if problems continue beyond her surgical rehabilitation.    Plan:  OP PT Plan  PT Plan: Skilled PT (Discharge)  Number of Treatments Authorized: 7/30    Goals:  Active       Neck and B shoulder pain       STG (Progressing)       Start:  10/24/23    Expected End:  11/21/23       1) Patient will improve NDI score by 5% to show an improvement in function in 4 weeks.  2) Patient will show an improvement in cervical SB ROM by 5 degrees B in order to allow for improved driving ability in 4 weeks.  3) Patient will be able to perform ADLs without pain exceeding 4/10 on the VAS in 4 weeks.  4) Patient will be independent with HEP in 3 visits in order to allow for improved function with home and community tasks.          LTG (Progressing)       Start:  10/24/23    Expected End:  12/19/23       1) Patient will improve NDI score to </=15% to show an improvement in function at home in 8 weeks.  2) Patient will improve scapular strength to >/=4+/5 in order to reduce compensatory guarding in upper trapezius and greater functional use of upper extremities in 8 weeks.   3) Patient will be able to perform ADLs without pain exceeding 2 /10 on the VAS in 8 weeks.  4) Patient will return to all work related tasks to improve QOL in 8 weeks.   5) Pt will have full shoulder mobility B to improve ADLs and dressing by discharge.               Alfredito Stephens, PT

## 2023-11-28 NOTE — PREPROCEDURE INSTRUCTIONS
Medication List            Accurate as of November 28, 2023  1:32 PM. Always use your most recent med list.                ascorbic acid (vitamin C) 500 mg capsule  Medication Adjustments for Surgery: Stop 7 days before surgery     B complex-vitamin C-folic acid 1- mg-mg-mcg tablet  Commonly known as: Nephro-Harley Rx  Medication Adjustments for Surgery: Stop 7 days before surgery     cholecalciferol 25 MCG (1000 UT) capsule  Commonly known as: Vitamin D-3  Medication Adjustments for Surgery: Stop 7 days before surgery     cloNIDine ER 0.1 mg tablet extended release 12 hr  Commonly known as: Kapvay  Take 2 tablets (0.2 mg) by mouth once daily at bedtime.  Notes to patient: Take evening dose before surgery.     FLUoxetine 40 mg capsule  Commonly known as: PROzac  TAKE 1 CAPSULE BY MOUTH ONCE DAILY  Notes to patient: Take evening dose before surgery.     folic acid 1 mg tablet  Commonly known as: Folvite  Take 1 tablet (1 mg) by mouth once daily.     methotrexate 2.5 mg tablet  Commonly known as: Trexall  Take 4 tablets (10 mg total) by mouth 1 (one) time per week.     MULTIVITAMIN-FERROUS FUMARATE-FOLIC ACID 9 MG-200 MCG TABLET (HALF TABLET)  Commonly known as: Centrum  Medication Adjustments for Surgery: Stop 7 days before surgery     QUEtiapine 50 mg tablet  Commonly known as: SEROquel  Take 0.5 tablets (25 mg) by mouth once daily at bedtime.  Notes to patient: Take evening dose before surgery.     rizatriptan 10 mg tablet  Commonly known as: Maxalt  Notes to patient: WHEN NEEDED                      PAT DISCHARGE INSTRUCTIONS    Please call the Same Day Surgery (SDS) Department of the hospital where your procedure will be performed after 2:00 PM the day before your surgery. If you are scheduled on a Monday, or a Tuesday following a Monday holiday, you will need to call on the last business day prior to your surgery.    [facility]  [address] [place] [address],  [id number]  [phone]    [facility]  [address] [place] [address]  [phone]    [facility]  [address]  [place] [id number]  [phone]    Please let your surgeon know if:      You develop any open sores, shingles, burning or painful urination as these may increase your risk of an infection.   You no longer wish to have the surgery.   Any other personal circumstances change that may lead to the need to cancel or defer this surgery-such as being sick or getting admitted to any hospital within one week of your planned procedure.    Your contact details change, such as a change of address or phone number.    Starting now:     Please DO NOT drink alcohol or smoke for 24 hours before surgery. It is well known that quitting smoking can make a huge difference to your health and recovery from surgery. The longer you abstain from smoking, the better your chances of a healthy recovery. If you need help with quitting, call 6-364-QUIT-NOW to be connected to a trained counselor who will discuss the best methods to help you quit.     Before your surgery:    Please stop all supplements 7 days prior to surgery. Or as directed by your surgeon.   Please stop taking NSAID pain medicine such as Advil and Motrin 7 days before surgery.    If you develop any fever, cough, cold, rashes, cuts, scratches, scrapes, urinary symptoms or infection anywhere on your body (including teeth and gums) prior to surgery, please call your surgeon’s office as soon as possible. This may require treatment to reduce the chance of cancellation on the day of surgery.    The day before your surgery:   DIET- Do not eat any food after MIDNIGHT. May have 10 ounces of CLEAR LIQUIDS until TWO HOURS before your arrival time. This includes water, black tea or coffee (no milk ir cream), apple juice and electrolyte drinks (Gatorade). May chew gum until TWO hours before your surgery time.   Get a good night’s rest.  Use the  special soap for bathing if you have been instructed to use one.    Scheduled surgery times may change and you will be notified if this occurs - please check your personal voicemail for any updates.     On the morning of surgery:   Wear comfortable, loose fitting clothes which open in the front. Please do not wear moisturizers, creams, lotions, makeup or perfume.    Please bring with you to surgery:   Photo ID and insurance card   Current list of medicines and allergies   Pacemaker/ Defibrillator/Heart stent cards   CPAP machine and mask    Slings/ splints/ crutches   A copy of your complete advanced directive/DHPOA.    Please do NOT bring with you to surgery:   All jewelry and valuables should be left at home.   Prosthetic devices such as contact lenses, hearing aids, dentures, eyelash extensions, hairpins and body piercings must be removed prior to going in to the surgical suite.    After outpatient surgery:   A responsible adult MUST accompany you at the time of discharge and stay with you for 24 hours after your surgery. You may NOT drive yourself home after surgery.    Do not drive, operate machinery, make critical decisions or do activities that require co-ordination or balance until after a night’s sleep.   Do not drink alcoholic beverages for 24 hours.   Instructions for resuming your medications will be provided by your surgeon.    CALL YOUR DOCTOR AFTER SURGERY IF YOU HAVE:     Chills and/or a fever of 101° F or higher.    Redness, swelling, pus or drainage from your surgical wound or a bad smell from the wound.    Lightheadedness, fainting or confusion.    Persistent vomiting (throwing up) and are not able to eat or drink for 12 hours.    Three or more loose, watery bowel movements in 24 hours (diarrhea).   Difficulty or pain while urinating( after non-urological surgery)    Pain and swelling in your legs, especially if it is only on one side.    Difficulty breathing or are breathing faster than normal.     Any new concerning symptoms.

## 2023-11-29 RX ORDER — CEFAZOLIN SODIUM 2 G/100ML
2 INJECTION, SOLUTION INTRAVENOUS ONCE
Status: CANCELLED | OUTPATIENT
Start: 2023-11-29 | End: 2023-11-29

## 2023-11-29 RX ORDER — SODIUM CHLORIDE, SODIUM LACTATE, POTASSIUM CHLORIDE, CALCIUM CHLORIDE 600; 310; 30; 20 MG/100ML; MG/100ML; MG/100ML; MG/100ML
100 INJECTION, SOLUTION INTRAVENOUS CONTINUOUS
Status: CANCELLED | OUTPATIENT
Start: 2023-11-29

## 2023-11-30 ENCOUNTER — HOSPITAL ENCOUNTER (OUTPATIENT)
Facility: HOSPITAL | Age: 23
Setting detail: OUTPATIENT SURGERY
Discharge: HOME | End: 2023-11-30
Attending: ORTHOPAEDIC SURGERY | Admitting: ORTHOPAEDIC SURGERY
Payer: COMMERCIAL

## 2023-11-30 ENCOUNTER — APPOINTMENT (OUTPATIENT)
Dept: PHYSICAL THERAPY | Facility: CLINIC | Age: 23
End: 2023-11-30
Payer: COMMERCIAL

## 2023-11-30 ENCOUNTER — ANESTHESIA (OUTPATIENT)
Dept: OPERATING ROOM | Facility: HOSPITAL | Age: 23
End: 2023-11-30
Payer: COMMERCIAL

## 2023-11-30 VITALS
HEIGHT: 69 IN | BODY MASS INDEX: 24.59 KG/M2 | OXYGEN SATURATION: 100 % | TEMPERATURE: 97.2 F | HEART RATE: 69 BPM | RESPIRATION RATE: 16 BRPM | SYSTOLIC BLOOD PRESSURE: 121 MMHG | WEIGHT: 166.01 LBS | DIASTOLIC BLOOD PRESSURE: 72 MMHG

## 2023-11-30 DIAGNOSIS — G56.22 LESION OF LEFT ULNAR NERVE: Primary | ICD-10-CM

## 2023-11-30 LAB — HCG UR QL IA.RAPID: NEGATIVE

## 2023-11-30 PROCEDURE — 2500000004 HC RX 250 GENERAL PHARMACY W/ HCPCS (ALT 636 FOR OP/ED): Performed by: ORTHOPAEDIC SURGERY

## 2023-11-30 PROCEDURE — 81025 URINE PREGNANCY TEST: CPT | Performed by: ORTHOPAEDIC SURGERY

## 2023-11-30 PROCEDURE — 7100000002 HC RECOVERY ROOM TIME - EACH INCREMENTAL 1 MINUTE: Performed by: ORTHOPAEDIC SURGERY

## 2023-11-30 PROCEDURE — 2500000004 HC RX 250 GENERAL PHARMACY W/ HCPCS (ALT 636 FOR OP/ED): Performed by: NURSE ANESTHETIST, CERTIFIED REGISTERED

## 2023-11-30 PROCEDURE — A64718 PR REVISE ULNAR NERVE AT ELBOW: Performed by: ANESTHESIOLOGY

## 2023-11-30 PROCEDURE — 7100000009 HC PHASE TWO TIME - INITIAL BASE CHARGE: Performed by: ORTHOPAEDIC SURGERY

## 2023-11-30 PROCEDURE — 2500000004 HC RX 250 GENERAL PHARMACY W/ HCPCS (ALT 636 FOR OP/ED): Performed by: ANESTHESIOLOGY

## 2023-11-30 PROCEDURE — 3600000008 HC OR TIME - EACH INCREMENTAL 1 MINUTE - PROCEDURE LEVEL THREE: Performed by: ORTHOPAEDIC SURGERY

## 2023-11-30 PROCEDURE — 3600000003 HC OR TIME - INITIAL BASE CHARGE - PROCEDURE LEVEL THREE: Performed by: ORTHOPAEDIC SURGERY

## 2023-11-30 PROCEDURE — 3700000001 HC GENERAL ANESTHESIA TIME - INITIAL BASE CHARGE: Performed by: ORTHOPAEDIC SURGERY

## 2023-11-30 PROCEDURE — A64718 PR REVISE ULNAR NERVE AT ELBOW: Performed by: NURSE ANESTHETIST, CERTIFIED REGISTERED

## 2023-11-30 PROCEDURE — 7100000001 HC RECOVERY ROOM TIME - INITIAL BASE CHARGE: Performed by: ORTHOPAEDIC SURGERY

## 2023-11-30 PROCEDURE — 7100000010 HC PHASE TWO TIME - EACH INCREMENTAL 1 MINUTE: Performed by: ORTHOPAEDIC SURGERY

## 2023-11-30 PROCEDURE — 3700000002 HC GENERAL ANESTHESIA TIME - EACH INCREMENTAL 1 MINUTE: Performed by: ORTHOPAEDIC SURGERY

## 2023-11-30 PROCEDURE — 2500000005 HC RX 250 GENERAL PHARMACY W/O HCPCS: Performed by: ORTHOPAEDIC SURGERY

## 2023-11-30 RX ORDER — SODIUM CHLORIDE, SODIUM LACTATE, POTASSIUM CHLORIDE, CALCIUM CHLORIDE 600; 310; 30; 20 MG/100ML; MG/100ML; MG/100ML; MG/100ML
40 INJECTION, SOLUTION INTRAVENOUS CONTINUOUS
Status: DISCONTINUED | OUTPATIENT
Start: 2023-11-30 | End: 2023-11-30 | Stop reason: HOSPADM

## 2023-11-30 RX ORDER — IPRATROPIUM BROMIDE 0.5 MG/2.5ML
500 SOLUTION RESPIRATORY (INHALATION) EVERY 30 MIN PRN
Status: DISCONTINUED | OUTPATIENT
Start: 2023-11-30 | End: 2023-11-30 | Stop reason: HOSPADM

## 2023-11-30 RX ORDER — ONDANSETRON HYDROCHLORIDE 2 MG/ML
4 INJECTION, SOLUTION INTRAVENOUS ONCE AS NEEDED
Status: DISCONTINUED | OUTPATIENT
Start: 2023-11-30 | End: 2023-11-30 | Stop reason: HOSPADM

## 2023-11-30 RX ORDER — SODIUM CHLORIDE, SODIUM LACTATE, POTASSIUM CHLORIDE, CALCIUM CHLORIDE 600; 310; 30; 20 MG/100ML; MG/100ML; MG/100ML; MG/100ML
100 INJECTION, SOLUTION INTRAVENOUS CONTINUOUS
Status: DISCONTINUED | OUTPATIENT
Start: 2023-11-30 | End: 2023-11-30 | Stop reason: HOSPADM

## 2023-11-30 RX ORDER — HYDROCODONE BITARTRATE AND ACETAMINOPHEN 5; 325 MG/1; MG/1
1 TABLET ORAL EVERY 4 HOURS PRN
Qty: 20 TABLET | Refills: 0 | Status: SHIPPED | OUTPATIENT
Start: 2023-11-30 | End: 2024-02-05 | Stop reason: WASHOUT

## 2023-11-30 RX ORDER — FENTANYL CITRATE 50 UG/ML
INJECTION, SOLUTION INTRAMUSCULAR; INTRAVENOUS AS NEEDED
Status: DISCONTINUED | OUTPATIENT
Start: 2023-11-30 | End: 2023-11-30

## 2023-11-30 RX ORDER — LABETALOL HYDROCHLORIDE 5 MG/ML
5 INJECTION, SOLUTION INTRAVENOUS EVERY 5 MIN PRN
Status: DISCONTINUED | OUTPATIENT
Start: 2023-11-30 | End: 2023-11-30 | Stop reason: HOSPADM

## 2023-11-30 RX ORDER — BUPIVACAINE HYDROCHLORIDE 5 MG/ML
INJECTION, SOLUTION PERINEURAL AS NEEDED
Status: DISCONTINUED | OUTPATIENT
Start: 2023-11-30 | End: 2023-11-30 | Stop reason: HOSPADM

## 2023-11-30 RX ORDER — DIPHENHYDRAMINE HYDROCHLORIDE 50 MG/ML
12.5 INJECTION INTRAMUSCULAR; INTRAVENOUS ONCE AS NEEDED
Status: DISCONTINUED | OUTPATIENT
Start: 2023-11-30 | End: 2023-11-30 | Stop reason: HOSPADM

## 2023-11-30 RX ORDER — PROPOFOL 10 MG/ML
INJECTION, EMULSION INTRAVENOUS AS NEEDED
Status: DISCONTINUED | OUTPATIENT
Start: 2023-11-30 | End: 2023-11-30

## 2023-11-30 RX ORDER — MIDAZOLAM HYDROCHLORIDE 1 MG/ML
INJECTION, SOLUTION INTRAMUSCULAR; INTRAVENOUS AS NEEDED
Status: DISCONTINUED | OUTPATIENT
Start: 2023-11-30 | End: 2023-11-30

## 2023-11-30 RX ORDER — DEXAMETHASONE SODIUM PHOSPHATE 4 MG/ML
INJECTION, SOLUTION INTRA-ARTICULAR; INTRALESIONAL; INTRAMUSCULAR; INTRAVENOUS; SOFT TISSUE AS NEEDED
Status: DISCONTINUED | OUTPATIENT
Start: 2023-11-30 | End: 2023-11-30

## 2023-11-30 RX ORDER — SCOLOPAMINE TRANSDERMAL SYSTEM 1 MG/1
1 PATCH, EXTENDED RELEASE TRANSDERMAL ONCE
Status: DISCONTINUED | OUTPATIENT
Start: 2023-11-30 | End: 2023-11-30 | Stop reason: HOSPADM

## 2023-11-30 RX ORDER — ALBUTEROL SULFATE 0.83 MG/ML
2.5 SOLUTION RESPIRATORY (INHALATION) EVERY 30 MIN PRN
Status: DISCONTINUED | OUTPATIENT
Start: 2023-11-30 | End: 2023-11-30 | Stop reason: HOSPADM

## 2023-11-30 RX ORDER — ONDANSETRON HYDROCHLORIDE 2 MG/ML
INJECTION, SOLUTION INTRAVENOUS AS NEEDED
Status: DISCONTINUED | OUTPATIENT
Start: 2023-11-30 | End: 2023-11-30

## 2023-11-30 RX ORDER — LIDOCAINE HYDROCHLORIDE 10 MG/ML
INJECTION INFILTRATION; PERINEURAL AS NEEDED
Status: DISCONTINUED | OUTPATIENT
Start: 2023-11-30 | End: 2023-11-30 | Stop reason: HOSPADM

## 2023-11-30 RX ORDER — CEFAZOLIN SODIUM 2 G/100ML
2 INJECTION, SOLUTION INTRAVENOUS ONCE
Status: COMPLETED | OUTPATIENT
Start: 2023-11-30 | End: 2023-11-30

## 2023-11-30 RX ORDER — FENTANYL CITRATE 50 UG/ML
50 INJECTION, SOLUTION INTRAMUSCULAR; INTRAVENOUS EVERY 5 MIN PRN
Status: DISCONTINUED | OUTPATIENT
Start: 2023-11-30 | End: 2023-11-30 | Stop reason: HOSPADM

## 2023-11-30 RX ORDER — MEPERIDINE HYDROCHLORIDE 25 MG/ML
12.5 INJECTION INTRAMUSCULAR; INTRAVENOUS; SUBCUTANEOUS EVERY 10 MIN PRN
Status: DISCONTINUED | OUTPATIENT
Start: 2023-11-30 | End: 2023-11-30 | Stop reason: HOSPADM

## 2023-11-30 RX ADMIN — ONDANSETRON 4 MG: 2 INJECTION, SOLUTION INTRAMUSCULAR; INTRAVENOUS at 09:04

## 2023-11-30 RX ADMIN — SCOPALAMINE 1 PATCH: 1 PATCH, EXTENDED RELEASE TRANSDERMAL at 08:12

## 2023-11-30 RX ADMIN — DEXAMETHASONE SODIUM PHOSPHATE 4 MG: 4 INJECTION, SOLUTION INTRAMUSCULAR; INTRAVENOUS at 09:03

## 2023-11-30 RX ADMIN — FENTANYL CITRATE 50 MCG: 50 INJECTION INTRAMUSCULAR; INTRAVENOUS at 08:58

## 2023-11-30 RX ADMIN — FENTANYL CITRATE 50 MCG: 50 INJECTION INTRAMUSCULAR; INTRAVENOUS at 08:54

## 2023-11-30 RX ADMIN — SODIUM CHLORIDE, SODIUM LACTATE, POTASSIUM CHLORIDE, AND CALCIUM CHLORIDE 100 ML/HR: 600; 310; 30; 20 INJECTION, SOLUTION INTRAVENOUS at 07:52

## 2023-11-30 RX ADMIN — CEFAZOLIN SODIUM 2 G: 2 INJECTION, SOLUTION INTRAVENOUS at 08:50

## 2023-11-30 RX ADMIN — PROPOFOL 200 MG: 10 INJECTION, EMULSION INTRAVENOUS at 08:55

## 2023-11-30 RX ADMIN — MIDAZOLAM 2 MG: 1 INJECTION INTRAMUSCULAR; INTRAVENOUS at 08:50

## 2023-11-30 RX ADMIN — HYDROMORPHONE HYDROCHLORIDE 0.5 MG: 1 INJECTION, SOLUTION INTRAMUSCULAR; INTRAVENOUS; SUBCUTANEOUS at 10:10

## 2023-11-30 RX ADMIN — HYDROMORPHONE HYDROCHLORIDE 0.5 MG: 1 INJECTION, SOLUTION INTRAMUSCULAR; INTRAVENOUS; SUBCUTANEOUS at 09:50

## 2023-11-30 SDOH — HEALTH STABILITY: MENTAL HEALTH: CURRENT SMOKER: 0

## 2023-11-30 ASSESSMENT — PAIN SCALES - GENERAL
PAINLEVEL_OUTOF10: 7
PAINLEVEL_OUTOF10: 4
PAINLEVEL_OUTOF10: 3
PAINLEVEL_OUTOF10: 3
PAINLEVEL_OUTOF10: 4
PAINLEVEL_OUTOF10: 7
PAIN_LEVEL: 0
PAINLEVEL_OUTOF10: 3

## 2023-11-30 ASSESSMENT — COLUMBIA-SUICIDE SEVERITY RATING SCALE - C-SSRS
2. HAVE YOU ACTUALLY HAD ANY THOUGHTS OF KILLING YOURSELF?: NO
1. IN THE PAST MONTH, HAVE YOU WISHED YOU WERE DEAD OR WISHED YOU COULD GO TO SLEEP AND NOT WAKE UP?: NO
6. HAVE YOU EVER DONE ANYTHING, STARTED TO DO ANYTHING, OR PREPARED TO DO ANYTHING TO END YOUR LIFE?: NO

## 2023-11-30 ASSESSMENT — PAIN - FUNCTIONAL ASSESSMENT
PAIN_FUNCTIONAL_ASSESSMENT: 0-10

## 2023-11-30 NOTE — OP NOTE
Ulnar Nerve Decompression, left elbow with possible transposition (L) Operative Note     Date: 2023  OR Location: [facility]    Name: Allyssa Kelly, : 2000, Age: 23 y.o., MRN: [id number], Sex: female    Diagnosis  Pre-op Diagnosis     * Lesion of left ulnar nerve [G56.22] Post-op Diagnosis     * Lesion of left ulnar nerve [G56.22]     Procedures  Ulnar Nerve Decompression, left elbow with possible transposition  [address] - [place] NEUROPLASTY &/TRANSPOSITION ULNAR NERVE ELBOW      Surgeons      * Karlo Washington - Primary    Resident/Fellow/Other Assistant:  Surgeon(s) and Role:    Procedure Summary  Anesthesia: * No anesthesia type entered *  ASA: I  Anesthesia Staff: Anesthesiologist: Misha Esteves MD  CRNA: ZELDA Vital-CRNA  Estimated Blood Loss: 3mL  Intra-op Medications:   Medication Name Total Dose   BUPivacaine HCl (Marcaine) 0.5 % (5 mg/mL) injection 5 mL   lidocaine (Xylocaine) 10 mg/mL (1 %) injection 5 mL   lactated Ringer's infusion 175 mL   ceFAZolin in dextrose (iso-os) (Ancef) IVPB 2 g 2 g              Anesthesia Record               Intraprocedure I/O Totals          Intake    Propofol Drip 0.00 mL    The total shown is the total volume documented since Anesthesia Start was filed.    lactated Ringer's infusion 500.00 mL    ceFAZolin in dextrose (iso-os) (Ancef) IVPB 2 g 100.00 mL    Total Intake 600 mL          Specimen: No specimens collected     Staff:   Circulator: Raul Bernstein RN  Scrub Person: Ganga Ramirez; Mekhi Pulliam         Drains and/or Catheters: * None in log *    Tourniquet Times:   * Missing tourniquet times found for documented tourniquets in lo *     Implants:     Findings: Thickened Madrigal's ligament.  Unstable ulnar nerve after in situ decompression requiring transposition.    Indications: Allyssa Kelly is an 23 y.o. female who is having surgery for G56.22.  She has failed outpatient management.    The patient was seen in the preoperative  area. The risks, benefits, complications, treatment options, non-operative alternatives, expected recovery and outcomes were discussed with the patient. The possibilities of reaction to medication, pulmonary aspiration, injury to surrounding structures, bleeding, recurrent infection, the need for additional procedures, failure to diagnose a condition, and creating a complication requiring transfusion or operation were discussed with the patient. The patient concurred with the proposed plan, giving informed consent.  The site of surgery was properly noted/marked if necessary per policy. The patient has been actively warmed in preoperative area. Preoperative antibiotics have been ordered and given within 1 hours of incision. Venous thrombosis prophylaxis have been ordered including bilateral sequential compression devices    Procedure Details: After obtaining informed surgical consent and the administration of prophylactic antibiotics the patient was brought to the operating room and placed supine on the operative table.  Successful general anesthetic administered.  Well-padded tourniquet placed about the left proximal humerus.  Sterile prep and drape of the left upper extremity completed utilizing standard orthopedic protocol.  Esmarch was utilized to exsanguinate the limb and the tourniquet was raised to 250 mmHg.  Attention turned to the posterior medial aspect of the elbow where a roughly 8 cm longitudinal incision was made along the course of the ulnar nerve.  Dissection was carried down through the subcutaneous tissues with careful identification of the medial antebrachial cutaneous nerves.  Hemostasis was obtained with bipolar electrocautery.  Proximal to the medial epicondyle the ulnar nerve was identified dissected free and followed proximally where the arcade of [place] was released.  Distal dissection was carried out through Madrigal's ligament which was significantly thickened followed by incision of the  FCU fascia and interval between the 2 heads of the FCU.  Upon doing such with elbow flexion the ulnar nerve was unstable in its position.  With that noted the nerve was able to be transposed anterior to the medial epicondyle.  A fascial slip was fashioned from the medial fascia and the nerve was placed anterior to this.  The fascial sling was secured to the subcutaneous tissues.  Range of motion of the elbow revealing no evidence of new areas of compression or instability of the nerve.  The wound was irrigated.  Wound closure obtained followed by placement of 1% plain lidocaine and half percent plain Marcaine and local block fashion.  Soft sterile dressing was applied.  Tourniquet released immediate capillary flow returning.  A well-padded long-arm posterior elbow and wrist splint applied.  Patient was then awakened, extubated, transferred to hospital bed and taken the postanesthesia care unit in stable condition.  Complications:  None; patient tolerated the procedure well.    Disposition: PACU - hemodynamically stable.  Condition: stable         Additional Details:     Attending Attestation: I performed the procedure.    Karlo Washington  Phone Number: [phone]

## 2023-11-30 NOTE — ANESTHESIA POSTPROCEDURE EVALUATION
Patient: Allyssa Kelly    Procedure Summary       Date: 11/30/23 Room / Location: [facility] OR 06 / [facility]    Anesthesia Start: 0850 Anesthesia Stop: 0952    Procedure: Ulnar Nerve Decompression, left elbow with possible transposition (Left: Elbow) Diagnosis:       Lesion of left ulnar nerve      (G56.22)    Surgeons: Karlo Washington DO Responsible Provider: Misha Esteves MD    Anesthesia Type: general ASA Status: 1            Anesthesia Type: general    Vitals Value Taken Time   /87 11/30/23 1010   Temp 36.5 °C (97.7 °F) 11/30/23 0949   Pulse 79 11/30/23 1025   Resp 14 11/30/23 1025   SpO2 98 % 11/30/23 1025       Anesthesia Post Evaluation    Patient location during evaluation: bedside  Patient participation: complete - patient participated  Level of consciousness: responsive to verbal stimuli  Pain score: 0  Pain management: adequate  Multimodal analgesia pain management approach  Airway patency: patent  Cardiovascular status: acceptable  Respiratory status: acceptable  Hydration status: acceptable  Postoperative Nausea and Vomiting: none  Comments: PT HEMODYNAMICALLY STABLE, NO PONV, AWAKE, ALERT AND ORIENTATED TIMES THREE        There were no known notable events for this encounter.

## 2023-11-30 NOTE — ANESTHESIA PREPROCEDURE EVALUATION
Patient: Allyssa Kelly    Procedure Information       Date/Time: 11/30/23 0830    Procedure: Ulnar Nerve Decompression, left elbow with possible transposition (Left: Elbow)    Location: [facility] / [facility]    Surgeons: Karlo Washington DO          Past Medical History:   Diagnosis Date    Acute pharyngitis, unspecified 07/21/2014    Sore throat    Acute pharyngitis, unspecified 04/18/2017    Sore throat    Acute tonsillitis, unspecified 03/22/2021    Acute tonsillitis, unspecified    Allergy, unspecified, initial encounter 10/08/2021    Hypersensitivity    Anxiety     Arthralgia of temporomandibular joint, unspecified side 04/07/2014    Temporomandibular joint pain    Body mass index (BMI) 19.9 or less, adult 09/17/2021    Body mass index (BMI) of 19.9 or less in adult    Body mass index (BMI) pediatric, 5th percentile to less than 85th percentile for age 09/17/2021    BMI (body mass index), pediatric, 5% to less than 85% for age    Calculus of kidney with calculus of ureter 02/12/2022    Calculus of kidney with calculus of ureter    Candidiasis of skin and nail 08/31/2020    Candidal skin infection    Cellulitis of left toe 04/06/2018    Cellulitis of fifth toe of left foot    Contact with and (suspected) exposure to other viral communicable diseases 02/13/2020    Exposure to influenza    COVID-19 12/30/2020    COVID-19 virus infection    Displaced fracture of shaft of third metacarpal bone, left hand, initial encounter for closed fracture 12/05/2016    Closed displaced fracture of shaft of third metacarpal bone of left hand, initial encounter    Encounter for general adult medical examination without abnormal findings 09/17/2021    Examination, routine, over 18 years of age    Encounter for pre-employment examination 09/17/2021    Encounter for pre-employment health screening examination    Encounter for routine child health examination without abnormal findings 10/03/2018    Encounter for routine  child health examination without abnormal findings    Hordeolum externum right upper eyelid 11/19/2021    Hordeolum externum of right upper eyelid    Jaw pain 10/07/2020    Chronic jaw pain    Joint derangement, unspecified 10/15/2021    Hypermobility of joint    Local infection of the skin and subcutaneous tissue, unspecified 04/26/2022    Infection of skin of finger    Localized swelling, mass and lump, head 04/28/2017    Tongue swelling    Nonscarring hair loss, unspecified 10/03/2018    Hair thinning    Other chronic diseases of tonsils and adenoids 04/28/2017    Cryptic tonsil    Other conditions influencing health status 11/18/2021    Encounter for procedure    Other diseases of tongue 04/28/2017    Tongue lesion    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    Other specified respiratory disorders 02/18/2019    Viral respiratory illness    Pain in left arm 11/22/2021    Pain of left upper extremity    Pain in right ankle and joints of right foot 09/23/2021    Right ankle pain    Pain in right wrist 06/08/2016    Right wrist pain    Pain in unspecified knee 09/30/2014    Joint pain, knee    Pain in unspecified shoulder 10/27/2014    Shoulder pain    Pain in unspecified wrist 11/22/2021    Chronic wrist pain    Panic attacks     Personal history of diseases of the skin and subcutaneous tissue 08/12/2017    History of telogen effluvium    Personal history of diseases of the skin and subcutaneous tissue 12/30/2020    History of pilonidal cyst    Personal history of diseases of the skin and subcutaneous tissue 04/06/2015    History of contact dermatitis    Personal history of other (healed) physical injury and trauma 11/19/2018    History of insect bite    Personal history of other diseases of the digestive system 02/13/2020    History of gastroenteritis    Personal history of other diseases of the female genital tract 08/12/2017    History of dysmenorrhea    Personal history of  other diseases of the musculoskeletal system and connective tissue 12/14/2017    History of low back pain    Personal history of other diseases of the musculoskeletal system and connective tissue 05/10/2022    History of muscle pain    Personal history of other diseases of the respiratory system 11/19/2021    History of sore throat    Personal history of other diseases of the respiratory system 01/17/2017    History of acute sinusitis    Personal history of other diseases of the respiratory system 02/18/2019    History of sore throat    Personal history of other diseases of the respiratory system 07/21/2014    History of pharyngitis    Personal history of other diseases of the respiratory system 01/07/2015    History of influenza    Personal history of other diseases of the respiratory system 01/17/2017    History of acute sinusitis    Personal history of other diseases of urinary system 02/12/2022    History of hematuria    Personal history of other infectious and parasitic diseases 07/21/2014    History of viral infection    Personal history of other specified conditions 02/12/2022    History of dysuria    Personal history of other specified conditions 02/12/2022    History of dysuria    Personal history of other specified conditions 09/20/2021    History of insomnia    Personal history of other specified conditions 10/15/2021    History of urinary urgency    Personal history of other specified conditions 10/15/2021    History of urinary frequency    Personal history of other specified conditions 04/05/2021    History of weight loss    Personal history of other specified conditions 02/18/2019    History of fever    Personal history of other specified conditions 03/15/2021    History of nasal congestion    Personal history of other specified conditions 02/21/2020    History of diarrhea    Personal history of other specified conditions 12/01/2016    History of abdominal pain    Personal history of other specified  conditions 01/07/2015    History of vomiting    Personal history of other specified conditions 01/07/2015    History of fever    PONV (postoperative nausea and vomiting)     WEARS PATCH    Premenstrual dysphoric disorder 01/25/2016    PMDD (premenstrual dysphoric disorder)    Raised antibody titer 02/24/2020    Elevated EBV antibody titer    Right lower quadrant pain 02/21/2020    Bilateral lower abdominal pain    Sprain of interphalangeal joint of right middle finger, initial encounter 03/06/2018    Sprain of interphalangeal joint of right middle finger, initial encounter    Strain of unspecified muscle(s) and tendon(s) at lower leg level, left leg, initial encounter 12/10/2018    Strain of left knee    Superficial mycosis, unspecified 10/17/2015    Fungal rash of trunk    Tic disorder, unspecified 11/01/2021    Tic disorder    TMJ (dislocation of temporomandibular joint)     Unspecified abdominal pain 12/01/2016    Abdominal pain, acute    Unspecified acute conjunctivitis, left eye 12/01/2016    Acute conjunctivitis, left eye    Unspecified conjunctivitis 11/13/2015    Conjunctivitis, left eye    Unspecified fracture of left wrist and hand, initial encounter for closed fracture 12/29/2016    Fracture of left hand, closed, initial encounter    Unspecified injury of left wrist, hand and finger(s), initial encounter 12/03/2016    Injury of left hand, initial encounter    Unspecified injury of right lower leg, initial encounter 12/07/2015    Right knee injury    Unspecified injury of right wrist, hand and finger(s), initial encounter 03/28/2017    Injury of right hand, initial encounter    Unspecified injury of right wrist, hand and finger(s), initial encounter 03/06/2018    Injury of finger of right hand, initial encounter    Unspecified injury of unspecified foot, initial encounter 05/02/2015    Foot injury    Unspecified injury of unspecified wrist, hand and finger(s), initial encounter     Finger injury     Unspecified renal colic 02/12/2022    Ureteral colic    Vomiting, unspecified 02/25/2020    Intermittent vomiting     Past Surgical History:   Procedure Laterality Date    OTHER SURGICAL HISTORY  12/11/2020    Foot surgery    OTHER SURGICAL HISTORY  10/19/2020    Wrist surgery    OTHER SURGICAL HISTORY  03/25/2022    Temporomandibular joint surgery    OTHER SURGICAL HISTORY  09/16/2020    Surgery    OTHER SURGICAL HISTORY  05/07/2021    Arthrocentesis (Therapeutic)         Relevant Problems   Anesthesia (within normal limits)      /Renal   (+) Calculus of kidney with calculus of ureter      Neuro/Psych   (+) Complex regional pain syndrome type 1 of left upper extremity   (+) Generalized anxiety disorder   (+) Social anxiety disorder      Musculoskeletal   (+) Complex regional pain syndrome type 1 of left upper extremity   (+) Neck pain, chronic       Clinical information reviewed:   Tobacco  Allergies  Meds   Med Hx  Surg Hx   Fam Hx          NPO Detail:  NPO/Void Status  Date of Last Liquid: 11/29/23  Time of Last Liquid: 2100  Date of Last Solid: 11/29/23  Time of Last Solid: 1900  Time of Last Void: 0740         Physical Exam    Airway  Mallampati: I  TM distance: >3 FB  Neck ROM: full     Cardiovascular - normal exam     Dental - normal exam     Pulmonary - normal exam     Abdominal            Anesthesia Plan    ASA 1     general     The patient is not a current smoker.    intravenous induction   Anesthetic plan and risks discussed with patient.    Plan discussed with CRNA and CAA.

## 2023-11-30 NOTE — ANESTHESIA PROCEDURE NOTES
Airway  Date/Time: 11/30/2023 8:56 AM    Staffing  Performed: CRNA   Authorized by: Misha Esteves MD    Performed by: ZELDA Vital-JENNIFER    Indications and Patient Condition  Indications for airway management: anesthesia  Spontaneous ventilation: present  Sedation level: deep  Preoxygenated: yes  Patient position: sniffing  Mask difficulty assessment: 1 - vent by mask    Final Airway Details  Final airway type: supraglottic airway      Successful airway: Size 3  Airway Seal Pressure (cm H2O): 8     Number of attempts at approach: 1

## 2023-12-05 ENCOUNTER — APPOINTMENT (OUTPATIENT)
Dept: PHYSICAL THERAPY | Facility: CLINIC | Age: 23
End: 2023-12-05
Payer: COMMERCIAL

## 2023-12-07 ENCOUNTER — APPOINTMENT (OUTPATIENT)
Dept: PHYSICAL THERAPY | Facility: CLINIC | Age: 23
End: 2023-12-07
Payer: COMMERCIAL

## 2023-12-14 ENCOUNTER — EVALUATION (OUTPATIENT)
Dept: OCCUPATIONAL THERAPY | Facility: CLINIC | Age: 23
End: 2023-12-14
Payer: COMMERCIAL

## 2023-12-14 ENCOUNTER — APPOINTMENT (OUTPATIENT)
Dept: PHYSICAL THERAPY | Facility: CLINIC | Age: 23
End: 2023-12-14
Payer: COMMERCIAL

## 2023-12-14 DIAGNOSIS — G56.22 ULNAR NEUROPATHY AT ELBOW, LEFT: Primary | ICD-10-CM

## 2023-12-14 PROCEDURE — 97165 OT EVAL LOW COMPLEX 30 MIN: CPT | Mod: GO

## 2023-12-14 ASSESSMENT — PAIN SCALES - GENERAL: PAINLEVEL_OUTOF10: 4

## 2023-12-14 ASSESSMENT — PAIN - FUNCTIONAL ASSESSMENT: PAIN_FUNCTIONAL_ASSESSMENT: 0-10

## 2023-12-14 NOTE — PROGRESS NOTES
Occupational Therapy    Occupational Therapy Evaluation    Name: Allyssa Kelly  MRN: [id number]  : 2000  Date: 23  Time Calculation  Start Time: 1103  Stop Time: 1133  Time Calculation (min): 30 min    Assessment:  OT Assessment  OT Assessment Results: Decreased ADL status, Decreased upper extremity range of motion, Decreased upper extremity strength  Plan:  Outpatient Plan  Treatment Interventions: UE strengthening/ROM (heat,ice, STM,scar massage)  Frequency: 1-2x/week  Duration: 6 weeks  Onset Date: 23  Rehab Potential: Good  Plan of Care Agreement: Patient    Subjective   Current Problem:  1. Ulnar neuropathy at elbow, left  Follow Up In Occupational Therapy        General:      General  Reason for Referral: s/p L ulnar n transposition  Referred By: Dr. Washington  Past Medical History Relevant to Rehab: lupus  Preferred Learning Style: auditory, visual, written  Precautions:   2 lb lifting precaution     Pain Assessment:  Pain Assessment  Pain Assessment: 0-10  Pain Score: 4  Pain Type: Acute pain, Surgical pain  Pain Location: Elbow  Pain Orientation: Left    Objective     Home Living: Lives with significant other     Prior Function Per Pt/Caregiver Report:  Prior Function Per Pt/Caregiver Report  Level of [place]: Independent with ADLs and functional transfers  IADL History:  Education: Attending school for nursing  Occupation: Part time employment ([employer])  ADL: Difficulty with grooming tasks, cooking, and household chores      Activity Tolerance:     Modalities:     Sensation: WFL     Strength:     Perception:     Coordination: WFL     Hand Function: WFL        Hand Assessment    Elbow       Observation; Surgical scar is dense and slightly thick medial elbow      Elbow Palpation/Joint Mobility Assessment     Elbow AROM -20/125     Elbow PROM WNL         Left Hand AROM: WNL       Left Hand Strength - : 30 lbs     Left Hand Strength - Pinch:     Left Upper Extremity Edema -  Circumference:     Outcome Measures: 70.45 DASH       OP EDUCATION:Access Code: [email or web address]  URL: [email or web address]  Date: 12/14/2023  Prepared by: Vijay Elaine    Exercises  - Seated Triceps Extension Single Arm  - 2-3 x daily - 7 x weekly - 3 sets - 10 reps - 5 hold  - Finger Spreading  - 2-3 x daily - 7 x weekly - 3 sets - 10 reps - 5 hold  - Standing Forearm Pronation and Supination AROM  - 2-3 x daily - 7 x weekly - 3 sets - 10 reps  - Wrist AROM Flexion Extension  - 2-3 x daily - 7 x weekly - 3 sets - 10 reps  - Standing Elbow Flexion Extension AROM  - 2-3 x daily - 7 x weekly - 3 sets - 10 reps    Education  Individual(s) Educated: Patient  Education Provided: Anatomy & Physiology, Diagnosis & Precautions  Home Program: AROM, Edema control    Goals:  Active       OT Problem       PATIENT WILL ACHIEVE left ELBOW FLEXION STRENGTH OF 5/5       Start:  12/14/23    Expected End:  01/18/24            PATIENT WILL ACHIEVE left ELBOW EXTENSION STRENGTH OF 5/5       Start:  12/14/23    Expected End:  01/18/24            PATIENT WILL ACHIEVE left  STRENGTH OF 50 lbs IN THE two position       Start:  12/14/23    Expected End:  01/18/24            PATIENT WILL ACHIEVE left ELBOW ROM 0-140 DEGREES       Start:  12/14/23    Expected End:  01/18/24            PATIENT WILL SHOW A SIGNIFICANT CHANGE IN DASH PATIENT REPORTED OUTCOME TOOL TO DEMONSTRATE SUBJECTIVE IMPROVEMENT       Start:  12/14/23    Expected End:  01/18/24            PATIENT WILL DEMONSTRATE INDEPENDENCE IN HOME PROGRAM FOR SUPPORT OF PROGRESSION       Start:  12/14/23    Expected End:  01/18/24            PATIENT WILL REPORT PAIN OF 0/10 DEMONSTRATING A REDUCTION OF OVERALL PAIN       Start:  12/14/23    Expected End:  01/18/24

## 2023-12-18 ENCOUNTER — APPOINTMENT (OUTPATIENT)
Dept: PHYSICAL THERAPY | Facility: CLINIC | Age: 23
End: 2023-12-18
Payer: COMMERCIAL

## 2023-12-18 DIAGNOSIS — L93.2 CUTANEOUS LUPUS ERYTHEMATOSUS: ICD-10-CM

## 2023-12-19 ENCOUNTER — TREATMENT (OUTPATIENT)
Dept: OCCUPATIONAL THERAPY | Facility: CLINIC | Age: 23
End: 2023-12-19
Payer: COMMERCIAL

## 2023-12-19 DIAGNOSIS — G56.22 ULNAR NEUROPATHY AT ELBOW, LEFT: Primary | ICD-10-CM

## 2023-12-19 PROCEDURE — 97110 THERAPEUTIC EXERCISES: CPT | Mod: GO

## 2023-12-19 RX ORDER — FOLIC ACID 1 MG/1
1 TABLET ORAL DAILY
Qty: 90 TABLET | Refills: 3 | Status: SHIPPED | OUTPATIENT
Start: 2023-12-19 | End: 2024-04-24 | Stop reason: ALTCHOICE

## 2023-12-19 RX ORDER — METHOTREXATE 2.5 MG/1
10 TABLET ORAL
Qty: 48 TABLET | Refills: 0 | Status: SHIPPED | OUTPATIENT
Start: 2023-12-19 | End: 2024-03-11 | Stop reason: SDUPTHER

## 2023-12-19 NOTE — PROGRESS NOTES
Occupational Therapy    Occupational Therapy Treatment    Name: Allyssa Kelly  MRN: [id number]  : 2000  Date: 23  Time Calculation  Start Time: 0820  Stop Time: 0900  Time Calculation (min): 40 min    Assessment: Pt is progressing well. Has full AROM L elbow      Plan: Continue with POC       Subjective It's doing pretty good   General:        Pain Assessment:1/10       Objective    Elbow ext/flex: 0/140    Modalities:     Communication:     Splinting:     Therapeutic Exercise  UBE x 6 min   Pulleys x 3 min   Active elbow flexion and extension with arm supported   Ball wall circles x 4 min   Red powerweb- digit flexion,abd, add x 10x2 each   Red flexbar- pro/sup/wrist flex and ext x 10each     Manual Therapy       Therapy/Activity:   Strength:     Other Activity:     Outcome Measures:      OP EDUCATION:       Goals:  Active       OT Problem       PATIENT WILL ACHIEVE left ELBOW FLEXION STRENGTH OF 5/5       Start:  23    Expected End:  24            PATIENT WILL ACHIEVE left ELBOW EXTENSION STRENGTH OF 5/5       Start:  23    Expected End:  24            PATIENT WILL ACHIEVE left  STRENGTH OF 50 lbs IN THE two position       Start:  23    Expected End:  24            PATIENT WILL ACHIEVE left ELBOW ROM 0-140 DEGREES       Start:  23    Expected End:  24            PATIENT WILL SHOW A SIGNIFICANT CHANGE IN DASH PATIENT REPORTED OUTCOME TOOL TO DEMONSTRATE SUBJECTIVE IMPROVEMENT       Start:  23    Expected End:  24            PATIENT WILL DEMONSTRATE INDEPENDENCE IN HOME PROGRAM FOR SUPPORT OF PROGRESSION       Start:  23    Expected End:  24            PATIENT WILL REPORT PAIN OF 0/10 DEMONSTRATING A REDUCTION OF OVERALL PAIN       Start:  23    Expected End:  24

## 2023-12-20 ENCOUNTER — APPOINTMENT (OUTPATIENT)
Dept: PHYSICAL THERAPY | Facility: CLINIC | Age: 23
End: 2023-12-20
Payer: COMMERCIAL

## 2023-12-27 ENCOUNTER — TREATMENT (OUTPATIENT)
Dept: OCCUPATIONAL THERAPY | Facility: CLINIC | Age: 23
End: 2023-12-27
Payer: COMMERCIAL

## 2023-12-27 DIAGNOSIS — G56.22 ULNAR NEUROPATHY AT ELBOW, LEFT: ICD-10-CM

## 2023-12-27 PROCEDURE — 97110 THERAPEUTIC EXERCISES: CPT | Mod: GO

## 2023-12-27 NOTE — PROGRESS NOTES
Occupational Therapy    Occupational Therapy Treatment    Name: Allyssa Kelly  MRN: [id number]  : 2000  Date: 23  Time Calculation  Start Time: 1517  Stop Time: 1600  Time Calculation (min): 43 min    Assessment: Pt is progressing well. Demonstrates increased  strength Has full AROM L elbow      Plan: Will progress strengthening as tolerated       Subjective  If I move my arm quickly I feel a pain in my forearm   General:        Pain Assessment:1/10       Objective    Elbow ext/flex: 0/140    Modalities:     Communication:     Splinting:     Therapeutic Exercise  UBE x 6 min   Pulleys x 3 min   Active elbow flexion and extension with arm supported   Prone T and Y on swissball x 15x2 each with 2 lbs   Red powerweb- digit flexion,abd, add x 10x2 each   Red flexbar- pro/sup/wrist flex and ext x 10each  Elbow flexion and extension with a cable machine  x 15x2 each with 5 lbs     Manual Therapy       Therapy/Activity:   Strength:     Other Activity:     Outcome Measures:      OP EDUCATION:       Goals:  Active       OT Problem       PATIENT WILL ACHIEVE left ELBOW FLEXION STRENGTH OF 5/5       Start:  23    Expected End:  24            PATIENT WILL ACHIEVE left ELBOW EXTENSION STRENGTH OF 5/5       Start:  23    Expected End:  24            PATIENT WILL ACHIEVE left  STRENGTH OF 50 lbs IN THE two position       Start:  23    Expected End:  24            PATIENT WILL ACHIEVE left ELBOW ROM 0-140 DEGREES       Start:  23    Expected End:  24            PATIENT WILL SHOW A SIGNIFICANT CHANGE IN DASH PATIENT REPORTED OUTCOME TOOL TO DEMONSTRATE SUBJECTIVE IMPROVEMENT       Start:  23    Expected End:  24            PATIENT WILL DEMONSTRATE INDEPENDENCE IN HOME PROGRAM FOR SUPPORT OF PROGRESSION       Start:  23    Expected End:  24            PATIENT WILL REPORT PAIN OF 0/10 DEMONSTRATING A REDUCTION OF OVERALL PAIN        Start:  12/14/23    Expected End:  01/18/24

## 2023-12-29 ENCOUNTER — TREATMENT (OUTPATIENT)
Dept: OCCUPATIONAL THERAPY | Facility: CLINIC | Age: 23
End: 2023-12-29
Payer: COMMERCIAL

## 2023-12-29 DIAGNOSIS — G56.22 ULNAR NEUROPATHY AT ELBOW, LEFT: ICD-10-CM

## 2023-12-29 PROCEDURE — 97110 THERAPEUTIC EXERCISES: CPT | Mod: GO

## 2023-12-29 NOTE — PROGRESS NOTES
Occupational Therapy    Occupational Therapy Treatment    Name: Allyssa Kelly  MRN: [id number]  : 2000  Date: 23  Time Calculation  Start Time: 1200  Stop Time: 1230  Time Calculation (min): 30 min    Assessment: Pt is progressing well. Demonstrates increased  strength Has full AROM L elbow      Plan: Will progress strengthening as tolerated       Subjective  If I move my arm quickly I feel a pain in my forearm   General:        Pain Assessment:0/10       Objective    Elbow ext/flex: 0/140    Modalities:     Communication:     Splinting:     Therapeutic Exercise  UBE x 6 min   Pulleys x 3 min   Active elbow flexion and extension with arm supported   Wall push up x 15x3  Plyos- one hand throw and catch   Red powerweb- digit flexion,abd, add x 10x2 each   Red flexbar- pro/sup/wrist flex and ext x 10each  Elbow flexion and extension with a cable machine  x 15x2 each with 5 lbs     Manual Therapy       Therapy/Activity:   Strength:     Other Activity:     Outcome Measures:      OP EDUCATION:       Goals:  Active       OT Problem       PATIENT WILL ACHIEVE left ELBOW FLEXION STRENGTH OF 5/5       Start:  23    Expected End:  24            PATIENT WILL ACHIEVE left ELBOW EXTENSION STRENGTH OF 5/5       Start:  23    Expected End:  24            PATIENT WILL ACHIEVE left  STRENGTH OF 50 lbs IN THE two position       Start:  23    Expected End:  24            PATIENT WILL ACHIEVE left ELBOW ROM 0-140 DEGREES       Start:  23    Expected End:  24            PATIENT WILL SHOW A SIGNIFICANT CHANGE IN DASH PATIENT REPORTED OUTCOME TOOL TO DEMONSTRATE SUBJECTIVE IMPROVEMENT       Start:  23    Expected End:  24            PATIENT WILL DEMONSTRATE INDEPENDENCE IN HOME PROGRAM FOR SUPPORT OF PROGRESSION       Start:  23    Expected End:  24            PATIENT WILL REPORT PAIN OF 0/10 DEMONSTRATING A REDUCTION OF OVERALL PAIN        Start:  12/14/23    Expected End:  01/18/24

## 2024-01-02 ENCOUNTER — APPOINTMENT (OUTPATIENT)
Dept: PHYSICAL THERAPY | Facility: CLINIC | Age: 24
End: 2024-01-02
Payer: COMMERCIAL

## 2024-01-05 ENCOUNTER — APPOINTMENT (OUTPATIENT)
Dept: OCCUPATIONAL THERAPY | Facility: CLINIC | Age: 24
End: 2024-01-05
Payer: COMMERCIAL

## 2024-01-05 ENCOUNTER — APPOINTMENT (OUTPATIENT)
Dept: PHYSICAL THERAPY | Facility: CLINIC | Age: 24
End: 2024-01-05
Payer: COMMERCIAL

## 2024-01-08 ENCOUNTER — APPOINTMENT (OUTPATIENT)
Dept: PHYSICAL THERAPY | Facility: CLINIC | Age: 24
End: 2024-01-08
Payer: COMMERCIAL

## 2024-01-11 ENCOUNTER — APPOINTMENT (OUTPATIENT)
Dept: PHYSICAL THERAPY | Facility: CLINIC | Age: 24
End: 2024-01-11
Payer: COMMERCIAL

## 2024-02-02 ENCOUNTER — LAB (OUTPATIENT)
Dept: LAB | Facility: LAB | Age: 24
End: 2024-02-02
Payer: COMMERCIAL

## 2024-02-02 DIAGNOSIS — N92.6 IRREGULAR MENSTRUATION, UNSPECIFIED: Primary | ICD-10-CM

## 2024-02-02 DIAGNOSIS — L93.2 CUTANEOUS LUPUS ERYTHEMATOSUS: ICD-10-CM

## 2024-02-02 LAB
ALBUMIN SERPL BCP-MCNC: 4.8 G/DL (ref 3.4–5)
ALP SERPL-CCNC: 75 U/L (ref 33–110)
ALT SERPL W P-5'-P-CCNC: 16 U/L (ref 7–45)
ANION GAP SERPL CALC-SCNC: 13 MMOL/L (ref 10–20)
AST SERPL W P-5'-P-CCNC: 20 U/L (ref 9–39)
B-HCG SERPL-ACNC: <2 MIU/ML
BILIRUB SERPL-MCNC: 0.5 MG/DL (ref 0–1.2)
BUN SERPL-MCNC: 7 MG/DL (ref 6–23)
CALCIUM SERPL-MCNC: 9.8 MG/DL (ref 8.6–10.3)
CHLORIDE SERPL-SCNC: 102 MMOL/L (ref 98–107)
CO2 SERPL-SCNC: 27 MMOL/L (ref 21–32)
CREAT SERPL-MCNC: 0.72 MG/DL (ref 0.5–1.05)
CRP SERPL-MCNC: <0.1 MG/DL
EGFRCR SERPLBLD CKD-EPI 2021: >90 ML/MIN/1.73M*2
ERYTHROCYTE [DISTWIDTH] IN BLOOD BY AUTOMATED COUNT: 13.6 % (ref 11.5–14.5)
ERYTHROCYTE [SEDIMENTATION RATE] IN BLOOD BY WESTERGREN METHOD: 9 MM/H (ref 0–20)
GLUCOSE SERPL-MCNC: 84 MG/DL (ref 74–99)
HCT VFR BLD AUTO: 42.9 % (ref 36–46)
HGB BLD-MCNC: 14.1 G/DL (ref 12–16)
MCH RBC QN AUTO: 27.3 PG (ref 26–34)
MCHC RBC AUTO-ENTMCNC: 32.9 G/DL (ref 32–36)
MCV RBC AUTO: 83 FL (ref 80–100)
NRBC BLD-RTO: 0 /100 WBCS (ref 0–0)
PLATELET # BLD AUTO: 214 X10*3/UL (ref 150–450)
POTASSIUM SERPL-SCNC: 3.9 MMOL/L (ref 3.5–5.3)
PROT SERPL-MCNC: 7.4 G/DL (ref 6.4–8.2)
RBC # BLD AUTO: 5.16 X10*6/UL (ref 4–5.2)
SODIUM SERPL-SCNC: 138 MMOL/L (ref 136–145)
WBC # BLD AUTO: 5.7 X10*3/UL (ref 4.4–11.3)

## 2024-02-02 PROCEDURE — 36415 COLL VENOUS BLD VENIPUNCTURE: CPT

## 2024-02-02 PROCEDURE — 80053 COMPREHEN METABOLIC PANEL: CPT

## 2024-02-02 PROCEDURE — 85652 RBC SED RATE AUTOMATED: CPT

## 2024-02-02 PROCEDURE — 84702 CHORIONIC GONADOTROPIN TEST: CPT

## 2024-02-02 PROCEDURE — 85027 COMPLETE CBC AUTOMATED: CPT

## 2024-02-02 PROCEDURE — 86140 C-REACTIVE PROTEIN: CPT

## 2024-02-05 ENCOUNTER — TELEMEDICINE (OUTPATIENT)
Dept: BEHAVIORAL HEALTH | Facility: CLINIC | Age: 24
End: 2024-02-05
Payer: COMMERCIAL

## 2024-02-05 DIAGNOSIS — F95.9 TIC DISORDER: ICD-10-CM

## 2024-02-05 DIAGNOSIS — F41.8 OTHER SPECIFIED ANXIETY DISORDERS: ICD-10-CM

## 2024-02-05 PROCEDURE — 99214 OFFICE O/P EST MOD 30 MIN: CPT | Performed by: PSYCHIATRY & NEUROLOGY

## 2024-02-05 PROCEDURE — 1036F TOBACCO NON-USER: CPT | Performed by: PSYCHIATRY & NEUROLOGY

## 2024-02-05 RX ORDER — NORELGESTROMIN AND ETHINYL ESTRADIOL 35; 150 UG/MG; UG/MG
1 PATCH TRANSDERMAL
Status: ON HOLD | COMMUNITY
Start: 2020-02-21 | End: 2024-03-01 | Stop reason: ALTCHOICE

## 2024-02-05 RX ORDER — MIRTAZAPINE 7.5 MG/1
7.5 TABLET, FILM COATED ORAL NIGHTLY
Qty: 30 TABLET | Refills: 2 | Status: SHIPPED | OUTPATIENT
Start: 2024-02-05 | End: 2024-02-29

## 2024-02-05 NOTE — PROGRESS NOTES
"    Patient Name: Allyssa    Type of Service: Virtual   Date: 2/5/2024             Allyssa presents for follow up for   Chief Complaint   Patient presents with    Tics    Anxiety      - has had a stressful week  - trauma in ER she had to cover, a family friend passed away  - prior to this, more anxiety x several months  - has read about SE of seroquel, wants to get off    Mental Status Exam:  General Appearance: Well groomed, appropriate eye contact.  Attitude/Behavior: Cooperative, conversant, engaged, and with good eye contact.  Motor: No psychomotor agitation or retardation, no tremor or other abnormal movements.  Speech: Normal rate, volume, prosody  Mood: \"stressed\"  Affect: Anxious  Thought Process: Linear, goal directed  Thought Associations: No loosening of associations  Thought Content: normal  Perception: No perceptual abnormalities noted  Sensorium: Alert and oriented to person, place, time and situation  Insight: Intact  Judgment: Intact  Cognition: Cognitively intact to conversational testing with respect to attention, orientation, fund of knowledge, recent and remote memory, and language.      Assessment:  Problem List Items Addressed This Visit          Neuro    Tic disorder     Other Visit Diagnoses       Other specified anxiety disorders                 Plan:  - stop quetiapine tonight  - initiate 7.5 mg Remeron  - cont. Kapvay 0.2 mg at bedtime  - cont. Fluoxetine 40 mg       Next appointment: March 4 "

## 2024-02-28 ENCOUNTER — HOSPITAL ENCOUNTER (EMERGENCY)
Facility: HOSPITAL | Age: 24
Discharge: HOME | End: 2024-02-29
Attending: STUDENT IN AN ORGANIZED HEALTH CARE EDUCATION/TRAINING PROGRAM
Payer: COMMERCIAL

## 2024-02-28 DIAGNOSIS — E16.2 HYPOGLYCEMIA: ICD-10-CM

## 2024-02-28 DIAGNOSIS — R10.9 ABDOMINAL PAIN, UNSPECIFIED ABDOMINAL LOCATION: Primary | ICD-10-CM

## 2024-02-28 DIAGNOSIS — N39.0 URINARY TRACT INFECTION WITH HEMATURIA, SITE UNSPECIFIED: ICD-10-CM

## 2024-02-28 DIAGNOSIS — R31.9 URINARY TRACT INFECTION WITH HEMATURIA, SITE UNSPECIFIED: ICD-10-CM

## 2024-02-28 LAB
ALBUMIN SERPL BCP-MCNC: 4.7 G/DL (ref 3.4–5)
ALP SERPL-CCNC: 89 U/L (ref 33–110)
ALT SERPL W P-5'-P-CCNC: 22 U/L (ref 7–45)
AMORPH CRY #/AREA UR COMP ASSIST: ABNORMAL /HPF
ANION GAP SERPL CALC-SCNC: 12 MMOL/L (ref 10–20)
APPEARANCE UR: ABNORMAL
AST SERPL W P-5'-P-CCNC: 23 U/L (ref 9–39)
BACTERIA #/AREA URNS AUTO: ABNORMAL /HPF
BASOPHILS # BLD AUTO: 0.02 X10*3/UL (ref 0–0.1)
BASOPHILS NFR BLD AUTO: 0.3 %
BILIRUB SERPL-MCNC: 0.3 MG/DL (ref 0–1.2)
BILIRUB UR STRIP.AUTO-MCNC: NEGATIVE MG/DL
BUN SERPL-MCNC: 14 MG/DL (ref 6–23)
CALCIUM SERPL-MCNC: 9.5 MG/DL (ref 8.6–10.3)
CHLORIDE SERPL-SCNC: 104 MMOL/L (ref 98–107)
CO2 SERPL-SCNC: 27 MMOL/L (ref 21–32)
COLOR UR: YELLOW
CREAT SERPL-MCNC: 0.82 MG/DL (ref 0.5–1.05)
EGFRCR SERPLBLD CKD-EPI 2021: >90 ML/MIN/1.73M*2
EOSINOPHIL # BLD AUTO: 0.24 X10*3/UL (ref 0–0.7)
EOSINOPHIL NFR BLD AUTO: 3.5 %
ERYTHROCYTE [DISTWIDTH] IN BLOOD BY AUTOMATED COUNT: 13.8 % (ref 11.5–14.5)
GLUCOSE SERPL-MCNC: 69 MG/DL (ref 74–99)
GLUCOSE UR STRIP.AUTO-MCNC: NEGATIVE MG/DL
HCG UR QL IA.RAPID: NEGATIVE
HCT VFR BLD AUTO: 44.7 % (ref 36–46)
HGB BLD-MCNC: 14.5 G/DL (ref 12–16)
IMM GRANULOCYTES # BLD AUTO: 0.01 X10*3/UL (ref 0–0.7)
IMM GRANULOCYTES NFR BLD AUTO: 0.1 % (ref 0–0.9)
KETONES UR STRIP.AUTO-MCNC: NEGATIVE MG/DL
LEUKOCYTE ESTERASE UR QL STRIP.AUTO: ABNORMAL
LIPASE SERPL-CCNC: 29 U/L (ref 9–82)
LYMPHOCYTES # BLD AUTO: 2.18 X10*3/UL (ref 1.2–4.8)
LYMPHOCYTES NFR BLD AUTO: 32.1 %
MCH RBC QN AUTO: 27.6 PG (ref 26–34)
MCHC RBC AUTO-ENTMCNC: 32.4 G/DL (ref 32–36)
MCV RBC AUTO: 85 FL (ref 80–100)
MONOCYTES # BLD AUTO: 0.48 X10*3/UL (ref 0.1–1)
MONOCYTES NFR BLD AUTO: 7.1 %
MUCOUS THREADS #/AREA URNS AUTO: ABNORMAL /LPF
NEUTROPHILS # BLD AUTO: 3.86 X10*3/UL (ref 1.2–7.7)
NEUTROPHILS NFR BLD AUTO: 56.9 %
NITRITE UR QL STRIP.AUTO: NEGATIVE
NRBC BLD-RTO: 0 /100 WBCS (ref 0–0)
PH UR STRIP.AUTO: 8 [PH]
PLATELET # BLD AUTO: 257 X10*3/UL (ref 150–450)
POTASSIUM SERPL-SCNC: 4.1 MMOL/L (ref 3.5–5.3)
PROT SERPL-MCNC: 7.7 G/DL (ref 6.4–8.2)
PROT UR STRIP.AUTO-MCNC: NEGATIVE MG/DL
RBC # BLD AUTO: 5.26 X10*6/UL (ref 4–5.2)
RBC # UR STRIP.AUTO: NEGATIVE /UL
RBC #/AREA URNS AUTO: ABNORMAL /HPF
SODIUM SERPL-SCNC: 139 MMOL/L (ref 136–145)
SP GR UR STRIP.AUTO: 1.02
SQUAMOUS #/AREA URNS AUTO: ABNORMAL /HPF
UROBILINOGEN UR STRIP.AUTO-MCNC: <2 MG/DL
WBC # BLD AUTO: 6.8 X10*3/UL (ref 4.4–11.3)
WBC #/AREA URNS AUTO: ABNORMAL /HPF

## 2024-02-28 PROCEDURE — 81001 URINALYSIS AUTO W/SCOPE: CPT | Performed by: STUDENT IN AN ORGANIZED HEALTH CARE EDUCATION/TRAINING PROGRAM

## 2024-02-28 PROCEDURE — 85025 COMPLETE CBC W/AUTO DIFF WBC: CPT | Performed by: STUDENT IN AN ORGANIZED HEALTH CARE EDUCATION/TRAINING PROGRAM

## 2024-02-28 PROCEDURE — 81025 URINE PREGNANCY TEST: CPT | Performed by: STUDENT IN AN ORGANIZED HEALTH CARE EDUCATION/TRAINING PROGRAM

## 2024-02-28 PROCEDURE — 2500000004 HC RX 250 GENERAL PHARMACY W/ HCPCS (ALT 636 FOR OP/ED): Performed by: STUDENT IN AN ORGANIZED HEALTH CARE EDUCATION/TRAINING PROGRAM

## 2024-02-28 PROCEDURE — 83690 ASSAY OF LIPASE: CPT | Performed by: STUDENT IN AN ORGANIZED HEALTH CARE EDUCATION/TRAINING PROGRAM

## 2024-02-28 PROCEDURE — 96361 HYDRATE IV INFUSION ADD-ON: CPT

## 2024-02-28 PROCEDURE — 96374 THER/PROPH/DIAG INJ IV PUSH: CPT

## 2024-02-28 PROCEDURE — 96375 TX/PRO/DX INJ NEW DRUG ADDON: CPT

## 2024-02-28 PROCEDURE — 99285 EMERGENCY DEPT VISIT HI MDM: CPT | Mod: 25

## 2024-02-28 PROCEDURE — 80053 COMPREHEN METABOLIC PANEL: CPT | Performed by: STUDENT IN AN ORGANIZED HEALTH CARE EDUCATION/TRAINING PROGRAM

## 2024-02-28 PROCEDURE — 36415 COLL VENOUS BLD VENIPUNCTURE: CPT | Performed by: STUDENT IN AN ORGANIZED HEALTH CARE EDUCATION/TRAINING PROGRAM

## 2024-02-28 PROCEDURE — 84702 CHORIONIC GONADOTROPIN TEST: CPT | Performed by: STUDENT IN AN ORGANIZED HEALTH CARE EDUCATION/TRAINING PROGRAM

## 2024-02-28 RX ORDER — MORPHINE SULFATE 4 MG/ML
4 INJECTION INTRAVENOUS ONCE
Status: COMPLETED | OUTPATIENT
Start: 2024-02-28 | End: 2024-02-28

## 2024-02-28 RX ORDER — ONDANSETRON HYDROCHLORIDE 2 MG/ML
4 INJECTION, SOLUTION INTRAVENOUS ONCE
Status: COMPLETED | OUTPATIENT
Start: 2024-02-28 | End: 2024-02-28

## 2024-02-28 RX ADMIN — ONDANSETRON 4 MG: 2 INJECTION INTRAMUSCULAR; INTRAVENOUS at 23:21

## 2024-02-28 RX ADMIN — MORPHINE SULFATE 4 MG: 4 INJECTION INTRAVENOUS at 23:21

## 2024-02-28 RX ADMIN — SODIUM CHLORIDE 1000 ML: 9 INJECTION, SOLUTION INTRAVENOUS at 23:21

## 2024-02-28 ASSESSMENT — COLUMBIA-SUICIDE SEVERITY RATING SCALE - C-SSRS
1. IN THE PAST MONTH, HAVE YOU WISHED YOU WERE DEAD OR WISHED YOU COULD GO TO SLEEP AND NOT WAKE UP?: NO
6. HAVE YOU EVER DONE ANYTHING, STARTED TO DO ANYTHING, OR PREPARED TO DO ANYTHING TO END YOUR LIFE?: NO
2. HAVE YOU ACTUALLY HAD ANY THOUGHTS OF KILLING YOURSELF?: NO

## 2024-02-28 ASSESSMENT — PAIN - FUNCTIONAL ASSESSMENT: PAIN_FUNCTIONAL_ASSESSMENT: 0-10

## 2024-02-28 ASSESSMENT — LIFESTYLE VARIABLES
HAVE YOU EVER FELT YOU SHOULD CUT DOWN ON YOUR DRINKING: NO
EVER FELT BAD OR GUILTY ABOUT YOUR DRINKING: NO
HAVE PEOPLE ANNOYED YOU BY CRITICIZING YOUR DRINKING: NO
EVER HAD A DRINK FIRST THING IN THE MORNING TO STEADY YOUR NERVES TO GET RID OF A HANGOVER: NO

## 2024-02-28 ASSESSMENT — PAIN DESCRIPTION - PAIN TYPE: TYPE: ACUTE PAIN

## 2024-02-28 ASSESSMENT — PAIN DESCRIPTION - PROGRESSION: CLINICAL_PROGRESSION: GRADUALLY WORSENING

## 2024-02-28 ASSESSMENT — PAIN DESCRIPTION - LOCATION: LOCATION: ABDOMEN

## 2024-02-28 ASSESSMENT — PAIN DESCRIPTION - ORIENTATION: ORIENTATION: LOWER;RIGHT

## 2024-02-28 ASSESSMENT — PAIN SCALES - GENERAL: PAINLEVEL_OUTOF10: 8

## 2024-02-28 NOTE — Clinical Note
Dc educ given, educ to contact gyn in the morning to try to schedule appt. Educ given on physical rx and pain management. Knows to follow up with pcp and gyn. Verb understanding of rx.

## 2024-02-29 ENCOUNTER — APPOINTMENT (OUTPATIENT)
Dept: RADIOLOGY | Facility: HOSPITAL | Age: 24
End: 2024-02-29
Payer: COMMERCIAL

## 2024-02-29 ENCOUNTER — PHARMACY VISIT (OUTPATIENT)
Dept: PHARMACY | Facility: CLINIC | Age: 24
End: 2024-02-29
Payer: COMMERCIAL

## 2024-02-29 ENCOUNTER — LAB REQUISITION (OUTPATIENT)
Dept: LAB | Facility: HOSPITAL | Age: 24
End: 2024-02-29
Payer: COMMERCIAL

## 2024-02-29 VITALS
TEMPERATURE: 98.2 F | SYSTOLIC BLOOD PRESSURE: 115 MMHG | HEART RATE: 81 BPM | BODY MASS INDEX: 23.86 KG/M2 | WEIGHT: 162 LBS | OXYGEN SATURATION: 100 % | DIASTOLIC BLOOD PRESSURE: 96 MMHG | RESPIRATION RATE: 16 BRPM

## 2024-02-29 DIAGNOSIS — F41.8 OTHER SPECIFIED ANXIETY DISORDERS: ICD-10-CM

## 2024-02-29 DIAGNOSIS — R10.2 PELVIC AND PERINEAL PAIN: ICD-10-CM

## 2024-02-29 LAB
B-HCG SERPL-ACNC: <2 MIU/ML
CLUE CELLS SPEC QL WET PREP: NORMAL
T VAGINALIS SPEC QL WET PREP: NORMAL
TRICHOMONAS REFLEX COMMENT: NORMAL
WBC VAG QL WET PREP: NORMAL
YEAST VAG QL WET PREP: NORMAL

## 2024-02-29 PROCEDURE — 76856 US EXAM PELVIC COMPLETE: CPT | Mod: FOREIGN READ | Performed by: RADIOLOGY

## 2024-02-29 PROCEDURE — 76856 US EXAM PELVIC COMPLETE: CPT | Mod: 59

## 2024-02-29 PROCEDURE — 2500000004 HC RX 250 GENERAL PHARMACY W/ HCPCS (ALT 636 FOR OP/ED): Performed by: STUDENT IN AN ORGANIZED HEALTH CARE EDUCATION/TRAINING PROGRAM

## 2024-02-29 PROCEDURE — 87661 TRICHOMONAS VAGINALIS AMPLIF: CPT | Mod: 59,GEALAB | Performed by: STUDENT IN AN ORGANIZED HEALTH CARE EDUCATION/TRAINING PROGRAM

## 2024-02-29 PROCEDURE — 74177 CT ABD & PELVIS W/CONTRAST: CPT

## 2024-02-29 PROCEDURE — 2550000001 HC RX 255 CONTRASTS: Performed by: STUDENT IN AN ORGANIZED HEALTH CARE EDUCATION/TRAINING PROGRAM

## 2024-02-29 PROCEDURE — 74177 CT ABD & PELVIS W/CONTRAST: CPT | Mod: FOREIGN READ | Performed by: RADIOLOGY

## 2024-02-29 PROCEDURE — 87800 DETECT AGNT MULT DNA DIREC: CPT | Mod: GEALAB | Performed by: STUDENT IN AN ORGANIZED HEALTH CARE EDUCATION/TRAINING PROGRAM

## 2024-02-29 PROCEDURE — 96375 TX/PRO/DX INJ NEW DRUG ADDON: CPT

## 2024-02-29 PROCEDURE — RXMED WILLOW AMBULATORY MEDICATION CHARGE

## 2024-02-29 PROCEDURE — 93975 VASCULAR STUDY: CPT

## 2024-02-29 PROCEDURE — 2500000004 HC RX 250 GENERAL PHARMACY W/ HCPCS (ALT 636 FOR OP/ED)

## 2024-02-29 PROCEDURE — 93976 VASCULAR STUDY: CPT | Mod: FOREIGN READ | Performed by: RADIOLOGY

## 2024-02-29 PROCEDURE — 87210 SMEAR WET MOUNT SALINE/INK: CPT | Performed by: STUDENT IN AN ORGANIZED HEALTH CARE EDUCATION/TRAINING PROGRAM

## 2024-02-29 PROCEDURE — 87661 TRICHOMONAS VAGINALIS AMPLIF: CPT

## 2024-02-29 PROCEDURE — 76830 TRANSVAGINAL US NON-OB: CPT | Mod: FOREIGN READ | Performed by: RADIOLOGY

## 2024-02-29 PROCEDURE — 96376 TX/PRO/DX INJ SAME DRUG ADON: CPT

## 2024-02-29 PROCEDURE — 87800 DETECT AGNT MULT DNA DIREC: CPT

## 2024-02-29 PROCEDURE — 87205 SMEAR GRAM STAIN: CPT

## 2024-02-29 PROCEDURE — 2500000001 HC RX 250 WO HCPCS SELF ADMINISTERED DRUGS (ALT 637 FOR MEDICARE OP): Performed by: STUDENT IN AN ORGANIZED HEALTH CARE EDUCATION/TRAINING PROGRAM

## 2024-02-29 RX ORDER — KETOROLAC TROMETHAMINE 15 MG/ML
15 INJECTION, SOLUTION INTRAMUSCULAR; INTRAVENOUS ONCE
Status: COMPLETED | OUTPATIENT
Start: 2024-02-29 | End: 2024-02-29

## 2024-02-29 RX ORDER — HYDROCODONE BITARTRATE AND ACETAMINOPHEN 5; 325 MG/1; MG/1
1 TABLET ORAL EVERY 6 HOURS PRN
Qty: 4 TABLET | Refills: 0 | Status: ON HOLD | OUTPATIENT
Start: 2024-02-29 | End: 2024-03-02 | Stop reason: SDUPTHER

## 2024-02-29 RX ORDER — KETOROLAC TROMETHAMINE 15 MG/ML
INJECTION, SOLUTION INTRAMUSCULAR; INTRAVENOUS
Status: COMPLETED
Start: 2024-02-29 | End: 2024-02-29

## 2024-02-29 RX ORDER — MORPHINE SULFATE 4 MG/ML
4 INJECTION INTRAVENOUS ONCE
Status: COMPLETED | OUTPATIENT
Start: 2024-02-29 | End: 2024-02-29

## 2024-02-29 RX ORDER — ONDANSETRON 4 MG/1
4 TABLET, ORALLY DISINTEGRATING ORAL EVERY 8 HOURS PRN
Qty: 20 TABLET | Refills: 0 | Status: ON HOLD | OUTPATIENT
Start: 2024-02-29 | End: 2024-03-02 | Stop reason: SDUPTHER

## 2024-02-29 RX ORDER — CEPHALEXIN 500 MG/1
500 CAPSULE ORAL ONCE
Status: COMPLETED | OUTPATIENT
Start: 2024-02-29 | End: 2024-02-29

## 2024-02-29 RX ORDER — MIRTAZAPINE 7.5 MG/1
7.5 TABLET, FILM COATED ORAL NIGHTLY
Qty: 90 TABLET | Refills: 2 | Status: SHIPPED | OUTPATIENT
Start: 2024-02-29 | End: 2024-11-25

## 2024-02-29 RX ORDER — CEPHALEXIN 500 MG/1
500 CAPSULE ORAL 4 TIMES DAILY
Qty: 28 CAPSULE | Refills: 0 | Status: SHIPPED | OUTPATIENT
Start: 2024-02-29 | End: 2024-03-02 | Stop reason: HOSPADM

## 2024-02-29 RX ADMIN — HYDROMORPHONE HYDROCHLORIDE 0.5 MG: 1 INJECTION, SOLUTION INTRAMUSCULAR; INTRAVENOUS; SUBCUTANEOUS at 04:43

## 2024-02-29 RX ADMIN — MORPHINE SULFATE 4 MG: 4 INJECTION INTRAVENOUS at 02:17

## 2024-02-29 RX ADMIN — IOHEXOL 75 ML: 350 INJECTION, SOLUTION INTRAVENOUS at 02:01

## 2024-02-29 RX ADMIN — KETOROLAC TROMETHAMINE 15 MG: 15 INJECTION, SOLUTION INTRAMUSCULAR; INTRAVENOUS at 01:43

## 2024-02-29 RX ADMIN — CEPHALEXIN 500 MG: 500 CAPSULE ORAL at 03:15

## 2024-02-29 ASSESSMENT — PAIN SCALES - GENERAL
PAINLEVEL_OUTOF10: 6
PAINLEVEL_OUTOF10: 10 - WORST POSSIBLE PAIN

## 2024-02-29 ASSESSMENT — PAIN DESCRIPTION - LOCATION
LOCATION: ABDOMEN
LOCATION: ABDOMEN

## 2024-02-29 ASSESSMENT — PAIN DESCRIPTION - PAIN TYPE
TYPE: ACUTE PAIN
TYPE: ACUTE PAIN

## 2024-02-29 NOTE — ED PROVIDER NOTES
History/Exam limitations: none  HPI was provided by patient    HPI:    Chief Complaint   Patient presents with    Abdominal Pain        Allyssa Kelly is a 24 y.o. female with chief complaint of abdominal pain and nausea.  It began  2 to 3 hours prior to arrival.  It is sharp in the right lower quadrant abdomen and made worse with palpation.  States her last menstrual period was 3 months ago but does have a copper IUD.  Denies any vaginal bleeding.  Pain she states slightly radiates down her right leg into her hip.  Does still have her appendix.  Pain not made better by Tylenol or Midol she took prior to arrival.  Also has nausea but no vomiting.  They deny recent travel, suspicious food intake, similar symptoms found amongst close contacts or recent antibiotic use.       ROS:  All other review of systems are negative except as noted above and HPI or ROS.   CONSTITUTIONAL: fever, chills  ENT: sore throat, congestion, rhinorrhea  CARDIOVASCULAR: chest pain, palpitations, swelling  RESPIRATORY: cough, wheeze, shortness of breath  GI: nausea, vomiting, diarrhea, abdominal pain,  black or tarry stools, constipation  GENITOURINARY: dysuria, hematuria, frequency  MUSCULOSKELETAL: deformity, neck pain  SKIN: rash, lesion  NEUROLOGIC: headache, numbness, focal weakness  NOTES: All systems reviewed, negative except as described above     Physical Exam:  GENERAL: Alert, oriented , cooperative  HEAD: normocephalic, atraumatic  SKIN:  dry skin, no lesions.  EYES: PERRL, EOMs intact,  Conjunctiva pink with no erythema or exudates. No scleral icterus.   ENT: No external deformities. Nares patent, mucus membranes moist.  Pharynx clear, uvula midline.   NECK: Supple, without meningismus. Trachea at midline. No lymphadenopathy.  PULMONARY: Clear bilaterally. No crackles, rhonchi, wheezing.  No respiratory distress.  No work of breathing.  CARDIAC: Regular rate and regular rhythm.  Pulses 2+ in radials and dorsal pedal pulses  bilaterally.  No murmur, rub, gallop.  No edema.  ABDOMEN: Soft, active bowel sounds.  No palpable organomegaly.  No rebound No CVA tenderness.  No pulsatile masses.  Negative Jules sign.  Periumbilical and right lower quadrant tender to palpation  : Female chaperone present in room during exam.  The external genitalia is without lesions or tenderness.  Introitus is normal, vaginal walls pink and moist without lesions or evidence of trauma.  Slight discomfort with the speculum examination.  White discharge present in vaginal vault.  Os is closed.  MUSCULOSKELETAL: Full range of motion throughout, no deformity.   NEUROLOGICAL:  no focal neuro deficits.  Strength 5 out of 5 throughout bilateral upper and lower extremities neurovascular intact in bilateral upper and lower extremities  PSYCHIATRIC: Appropriate mood and affect. Calm.    Medical Decision Making:     The patient presented for evaluation of abdominal pain.  Differential included but not limited to UTI, bowel obstruction, appendicitis, constipation, viral illness, pancreatitis, bowel ischemia, mass, STD, ovarian torsion, kidney stone  She stated at home she took multiple pregnancy test blood and urine and all negative.  Imaging studies if performed were independently reviewed and interpreted by myself and confirmed by radiologist.      External Records Reviewed: I reviewed recent and relevant outside records    On reevaluation abdomen is benign and nonsurgical.  Patient feels safe for discharge home.  Patient nontoxic-appearing on reexamination.  Vital signs are stable.  The patient and caregiver are in agreement with the plan and given instructions to follow up       I discussed the differential, results and plan with the patient and/or family/friend/caregiver if present.       I emphasized the importance of follow-up with the physician I referred them to in the timeframe recommended.  I explained reasons for the patient to return to the Emergency  Department. Additional verbal discharge instructions were also given and discussed with the patient to supplement those generated by the EMR. We also discussed medications that were prescribed (if any) including common side effects and interactions. The patient was advised to abstain from driving, operating heavy machinery or making significant decisions while taking medications such as opiates and muscle relaxers that may impair this. All questions were addressed.  They understand return precautions and discharge instructions. The patient and/or family/friend/caregiver expressed understanding.        Past Medical History:   Diagnosis Date    Acute pharyngitis, unspecified 07/21/2014    Sore throat    Acute pharyngitis, unspecified 04/18/2017    Sore throat    Acute tonsillitis, unspecified 03/22/2021    Acute tonsillitis, unspecified    Allergy, unspecified, initial encounter 10/08/2021    Hypersensitivity    Anxiety     Arthralgia of temporomandibular joint, unspecified side 04/07/2014    Temporomandibular joint pain    Body mass index (BMI) 19.9 or less, adult 09/17/2021    Body mass index (BMI) of 19.9 or less in adult    Body mass index (BMI) pediatric, 5th percentile to less than 85th percentile for age 09/17/2021    BMI (body mass index), pediatric, 5% to less than 85% for age    Calculus of kidney with calculus of ureter 02/12/2022    Calculus of kidney with calculus of ureter    Candidiasis of skin and nail 08/31/2020    Candidal skin infection    Cellulitis of left toe 04/06/2018    Cellulitis of fifth toe of left foot    Contact with and (suspected) exposure to other viral communicable diseases 02/13/2020    Exposure to influenza    COVID-19 12/30/2020    COVID-19 virus infection    Displaced fracture of shaft of third metacarpal bone, left hand, initial encounter for closed fracture 12/05/2016    Closed displaced fracture of shaft of third metacarpal bone of left hand, initial encounter    Encounter for  general adult medical examination without abnormal findings 09/17/2021    Examination, routine, over 18 years of age    Encounter for pre-employment examination 09/17/2021    Encounter for pre-employment health screening examination    Encounter for routine child health examination without abnormal findings 10/03/2018    Encounter for routine child health examination without abnormal findings    Hordeolum externum right upper eyelid 11/19/2021    Hordeolum externum of right upper eyelid    Jaw pain 10/07/2020    Chronic jaw pain    Joint derangement, unspecified 10/15/2021    Hypermobility of joint    Local infection of the skin and subcutaneous tissue, unspecified 04/26/2022    Infection of skin of finger    Localized swelling, mass and lump, head 04/28/2017    Tongue swelling    Nonscarring hair loss, unspecified 10/03/2018    Hair thinning    Other chronic diseases of tonsils and adenoids 04/28/2017    Cryptic tonsil    Other conditions influencing health status 11/18/2021    Encounter for procedure    Other diseases of tongue 04/28/2017    Tongue lesion    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    Other specified respiratory disorders 02/18/2019    Viral respiratory illness    Pain in left arm 11/22/2021    Pain of left upper extremity    Pain in right ankle and joints of right foot 09/23/2021    Right ankle pain    Pain in right wrist 06/08/2016    Right wrist pain    Pain in unspecified knee 09/30/2014    Joint pain, knee    Pain in unspecified shoulder 10/27/2014    Shoulder pain    Pain in unspecified wrist 11/22/2021    Chronic wrist pain    Panic attacks     Personal history of diseases of the skin and subcutaneous tissue 08/12/2017    History of telogen effluvium    Personal history of diseases of the skin and subcutaneous tissue 12/30/2020    History of pilonidal cyst    Personal history of diseases of the skin and subcutaneous tissue 04/06/2015    History of contact  dermatitis    Personal history of other (healed) physical injury and trauma 11/19/2018    History of insect bite    Personal history of other diseases of the digestive system 02/13/2020    History of gastroenteritis    Personal history of other diseases of the female genital tract 08/12/2017    History of dysmenorrhea    Personal history of other diseases of the musculoskeletal system and connective tissue 12/14/2017    History of low back pain    Personal history of other diseases of the musculoskeletal system and connective tissue 05/10/2022    History of muscle pain    Personal history of other diseases of the respiratory system 11/19/2021    History of sore throat    Personal history of other diseases of the respiratory system 01/17/2017    History of acute sinusitis    Personal history of other diseases of the respiratory system 02/18/2019    History of sore throat    Personal history of other diseases of the respiratory system 07/21/2014    History of pharyngitis    Personal history of other diseases of the respiratory system 01/07/2015    History of influenza    Personal history of other diseases of the respiratory system 01/17/2017    History of acute sinusitis    Personal history of other diseases of urinary system 02/12/2022    History of hematuria    Personal history of other infectious and parasitic diseases 07/21/2014    History of viral infection    Personal history of other specified conditions 02/12/2022    History of dysuria    Personal history of other specified conditions 02/12/2022    History of dysuria    Personal history of other specified conditions 09/20/2021    History of insomnia    Personal history of other specified conditions 10/15/2021    History of urinary urgency    Personal history of other specified conditions 10/15/2021    History of urinary frequency    Personal history of other specified conditions 04/05/2021    History of weight loss    Personal history of other specified  conditions 02/18/2019    History of fever    Personal history of other specified conditions 03/15/2021    History of nasal congestion    Personal history of other specified conditions 02/21/2020    History of diarrhea    Personal history of other specified conditions 12/01/2016    History of abdominal pain    Personal history of other specified conditions 01/07/2015    History of vomiting    Personal history of other specified conditions 01/07/2015    History of fever    PONV (postoperative nausea and vomiting)     WEARS PATCH    Premenstrual dysphoric disorder 01/25/2016    PMDD (premenstrual dysphoric disorder)    Raised antibody titer 02/24/2020    Elevated EBV antibody titer    Right lower quadrant pain 02/21/2020    Bilateral lower abdominal pain    Sprain of interphalangeal joint of right middle finger, initial encounter 03/06/2018    Sprain of interphalangeal joint of right middle finger, initial encounter    Strain of unspecified muscle(s) and tendon(s) at lower leg level, left leg, initial encounter 12/10/2018    Strain of left knee    Superficial mycosis, unspecified 10/17/2015    Fungal rash of trunk    Tic disorder, unspecified 11/01/2021    Tic disorder    TMJ (dislocation of temporomandibular joint)     Unspecified abdominal pain 12/01/2016    Abdominal pain, acute    Unspecified acute conjunctivitis, left eye 12/01/2016    Acute conjunctivitis, left eye    Unspecified conjunctivitis 11/13/2015    Conjunctivitis, left eye    Unspecified fracture of left wrist and hand, initial encounter for closed fracture 12/29/2016    Fracture of left hand, closed, initial encounter    Unspecified injury of left wrist, hand and finger(s), initial encounter 12/03/2016    Injury of left hand, initial encounter    Unspecified injury of right lower leg, initial encounter 12/07/2015    Right knee injury    Unspecified injury of right wrist, hand and finger(s), initial encounter 03/28/2017    Injury of right hand, initial  encounter    Unspecified injury of right wrist, hand and finger(s), initial encounter 03/06/2018    Injury of finger of right hand, initial encounter    Unspecified injury of unspecified foot, initial encounter 05/02/2015    Foot injury    Unspecified injury of unspecified wrist, hand and finger(s), initial encounter     Finger injury    Unspecified renal colic 02/12/2022    Ureteral colic    Vomiting, unspecified 02/25/2020    Intermittent vomiting      Social History     Socioeconomic History    Marital status: Single     Spouse name: Not on file    Number of children: Not on file    Years of education: Not on file    Highest education level: Not on file   Occupational History    Not on file   Tobacco Use    Smoking status: Never    Smokeless tobacco: Never   Vaping Use    Vaping Use: Never used   Substance and Sexual Activity    Alcohol use: Yes     Comment: social    Drug use: Never    Sexual activity: Not on file   Other Topics Concern    Not on file   Social History Narrative    Not on file     Social Determinants of Health     Financial Resource Strain: Not on file   Food Insecurity: Not on file   Transportation Needs: Not on file   Physical Activity: Not on file   Stress: Not on file   Social Connections: Not on file   Intimate Partner Violence: Not on file   Housing Stability: Not on file     Current Outpatient Medications   Medication Instructions    ascorbic acid, vitamin C, 500 mg capsule 1 tablet, oral, Daily, TAKE PER DIRECTED    B complex-vitamin C-folic acid (Nephro-Harley Rx) 1- mg-mg-mcg tablet 1 tablet, oral, TAKE PER DIRECTED    cephalexin (KEFLEX) 500 mg, oral, 4 times daily    cholecalciferol (VITAMIN D-3) 25 mcg, oral, Daily, TAKE PER DIRECTED    cloNIDine ER (KAPVAY) 0.2 mg, oral, Nightly    FLUoxetine (PROzac) 40 mg capsule TAKE 1 CAPSULE BY MOUTH ONCE DAILY    folic acid (FOLVITE) 1 mg, oral, Daily, 6 DAYS PER WEEK. NOT TO TAKE ON DAY when takes methotrexate    HYDROcodone-acetaminophen  (Norco) 5-325 mg tablet 1 tablet, oral, Every 6 hours PRN    methotrexate (TREXALL) 10 mg, oral, Weekly    mirtazapine (REMERON) 7.5 mg, oral, Nightly    MULTIVITAMIN-FERROUS FUMARATE-FOLIC ACID 9 MG-200 MCG TABLET, HALF TABLET, (Centrum) 1 tablet, oral, Daily    norelgestromin-ethin.estradioL (Ortho-Evra) 150-35 mcg/24 hr 1 patch, transdermal, Weekly    ondansetron ODT (ZOFRAN-ODT) 4 mg, oral, Every 8 hours PRN    rizatriptan (MAXALT) 10 mg, oral, Daily PRN     Allergies   Allergen Reactions    Tretinoin Rash           ED Triage Vitals [02/28/24 2302]   Temperature Heart Rate Respirations BP   36.8 °C (98.2 °F) 85 18 (!) 151/94      Pulse Ox Temp src Heart Rate Source Patient Position   96 % -- -- --      BP Location FiO2 (%)     -- --               Labs and Imaging were reviewed and discussed with patient          ED Course as of 02/29/24 0542   u Feb 29, 2024   0056 Leukocyte Esterase, Urine(!): SMALL (1+) [WL]   0056 WBC, Urine(!): 11-20 [WL]   0056 Bacteria, Urine(!): 1+ [WL]   0056 Amorphous Crystals, Urine(!): 3+ [WL]   0139 On reevaluation morphine had little improvement we will try Toradol given her urine shows findings of infection and some crystal formation raising higher concern for stone.  Pregnancy is negative. [WL]   0306 On reevaluation patient not satisfied with diagnosis of UTI and concerned she has not had a pregnancy and supposed to have regular periods.  She states now she has had some white discharge.  Offered pelvic but prefers self swab for STDs and wants an ultrasound.  Plan will be ultrasound to rule out any torsion.  Pregnancy test urine and serum negative. [WL]   0444 Wet prep was unremarkable.  Patient did not want to be treated prophylactically for STDs.  Glucose 69 but did tolerate p.o. well and given orange juice.  Gave her initial dose of Keflex here and prescription to go home with an ultrasound showed no torsion was unremarkable.  Plan is for discharge home and close follow-up  with primary care physician along with OB/GYN. [WL]   0537 Prior to being discharged she had improvement of her pain after the Dilaudid.  They did request to reach out to their OB which I sent to a message out to them at this time still waiting response. [WL]   0541 Dr Argueta states patient can call the office stating she is following up from the ER to come in earlier as she herself will be out of office till next Monday. [WL]   0541 Family is agreeable with this plan. [WL]      ED Course User Index  [WL] Thompson Murray DO         Diagnoses as of 02/29/24 0542   Urinary tract infection with hematuria, site unspecified   Abdominal pain, unspecified abdominal location   Hypoglycemia               Procedure  Procedures         Note: This note was dictated by speech recognition. Minor errors in transcription may be present.          Thompson Murray DO  02/29/24 0448       Thompson Murray,   02/29/24 0516

## 2024-03-01 ENCOUNTER — APPOINTMENT (OUTPATIENT)
Dept: RADIOLOGY | Facility: HOSPITAL | Age: 24
End: 2024-03-01
Payer: COMMERCIAL

## 2024-03-01 ENCOUNTER — HOSPITAL ENCOUNTER (OUTPATIENT)
Facility: HOSPITAL | Age: 24
Setting detail: OBSERVATION
Discharge: HOME | End: 2024-03-02
Attending: EMERGENCY MEDICINE | Admitting: FAMILY MEDICINE
Payer: COMMERCIAL

## 2024-03-01 DIAGNOSIS — R10.9 ABDOMINAL PAIN, UNSPECIFIED ABDOMINAL LOCATION: ICD-10-CM

## 2024-03-01 DIAGNOSIS — R10.9 INTRACTABLE ABDOMINAL PAIN: Primary | ICD-10-CM

## 2024-03-01 DIAGNOSIS — R11.0 NAUSEA: ICD-10-CM

## 2024-03-01 DIAGNOSIS — R31.9 URINARY TRACT INFECTION WITH HEMATURIA, SITE UNSPECIFIED: ICD-10-CM

## 2024-03-01 DIAGNOSIS — N39.0 URINARY TRACT INFECTION WITH HEMATURIA, SITE UNSPECIFIED: ICD-10-CM

## 2024-03-01 LAB
ALBUMIN SERPL BCP-MCNC: 4.4 G/DL (ref 3.4–5)
ALP SERPL-CCNC: 85 U/L (ref 33–110)
ALT SERPL W P-5'-P-CCNC: 19 U/L (ref 7–45)
ANION GAP SERPL CALC-SCNC: 12 MMOL/L (ref 10–20)
APPEARANCE UR: CLEAR
AST SERPL W P-5'-P-CCNC: 20 U/L (ref 9–39)
B-HCG SERPL-ACNC: <2 MIU/ML
BASOPHILS # BLD AUTO: 0.04 X10*3/UL (ref 0–0.1)
BASOPHILS NFR BLD AUTO: 0.7 %
BILIRUB SERPL-MCNC: 0.3 MG/DL (ref 0–1.2)
BILIRUB UR STRIP.AUTO-MCNC: NEGATIVE MG/DL
BUN SERPL-MCNC: 12 MG/DL (ref 6–23)
C TRACH RRNA SPEC QL NAA+PROBE: NEGATIVE
C TRACH RRNA SPEC QL NAA+PROBE: NEGATIVE
CALCIUM SERPL-MCNC: 9.2 MG/DL (ref 8.6–10.3)
CHLORIDE SERPL-SCNC: 102 MMOL/L (ref 98–107)
CLUE CELLS VAG LPF-#/AREA: NORMAL /[LPF]
CO2 SERPL-SCNC: 26 MMOL/L (ref 21–32)
COLOR UR: ABNORMAL
CREAT SERPL-MCNC: 0.67 MG/DL (ref 0.5–1.05)
CRP SERPL-MCNC: 0.1 MG/DL
EGFRCR SERPLBLD CKD-EPI 2021: >90 ML/MIN/1.73M*2
EOSINOPHIL # BLD AUTO: 0.31 X10*3/UL (ref 0–0.7)
EOSINOPHIL NFR BLD AUTO: 5.6 %
ERYTHROCYTE [DISTWIDTH] IN BLOOD BY AUTOMATED COUNT: 13.6 % (ref 11.5–14.5)
ERYTHROCYTE [SEDIMENTATION RATE] IN BLOOD BY WESTERGREN METHOD: 17 MM/H (ref 0–20)
GLUCOSE SERPL-MCNC: 84 MG/DL (ref 74–99)
GLUCOSE UR STRIP.AUTO-MCNC: NEGATIVE MG/DL
HCT VFR BLD AUTO: 43.2 % (ref 36–46)
HGB BLD-MCNC: 14.3 G/DL (ref 12–16)
HOLD SPECIMEN: NORMAL
IMM GRANULOCYTES # BLD AUTO: 0.01 X10*3/UL (ref 0–0.7)
IMM GRANULOCYTES NFR BLD AUTO: 0.2 % (ref 0–0.9)
KETONES UR STRIP.AUTO-MCNC: NEGATIVE MG/DL
LACTATE SERPL-SCNC: 0.5 MMOL/L (ref 0.4–2)
LEUKOCYTE ESTERASE UR QL STRIP.AUTO: ABNORMAL
LIPASE SERPL-CCNC: 17 U/L (ref 9–82)
LYMPHOCYTES # BLD AUTO: 1.86 X10*3/UL (ref 1.2–4.8)
LYMPHOCYTES NFR BLD AUTO: 33.5 %
MCH RBC QN AUTO: 27.8 PG (ref 26–34)
MCHC RBC AUTO-ENTMCNC: 33.1 G/DL (ref 32–36)
MCV RBC AUTO: 84 FL (ref 80–100)
MONOCYTES # BLD AUTO: 0.42 X10*3/UL (ref 0.1–1)
MONOCYTES NFR BLD AUTO: 7.6 %
N GONORRHOEA DNA SPEC QL PROBE+SIG AMP: NEGATIVE
N GONORRHOEA DNA SPEC QL PROBE+SIG AMP: NEGATIVE
NEUTROPHILS # BLD AUTO: 2.92 X10*3/UL (ref 1.2–7.7)
NEUTROPHILS NFR BLD AUTO: 52.4 %
NITRITE UR QL STRIP.AUTO: NEGATIVE
NRBC BLD-RTO: 0 /100 WBCS (ref 0–0)
NUGENT SCORE: 1
PH UR STRIP.AUTO: 7 [PH]
PLATELET # BLD AUTO: 231 X10*3/UL (ref 150–450)
POTASSIUM SERPL-SCNC: 4 MMOL/L (ref 3.5–5.3)
PROT SERPL-MCNC: 7.2 G/DL (ref 6.4–8.2)
PROT UR STRIP.AUTO-MCNC: NEGATIVE MG/DL
RBC # BLD AUTO: 5.14 X10*6/UL (ref 4–5.2)
RBC # UR STRIP.AUTO: NEGATIVE /UL
RBC #/AREA URNS AUTO: NORMAL /HPF
SODIUM SERPL-SCNC: 136 MMOL/L (ref 136–145)
SP GR UR STRIP.AUTO: 1.02
SQUAMOUS #/AREA URNS AUTO: NORMAL /HPF
T VAGINALIS RRNA SPEC QL NAA+PROBE: NEGATIVE
T VAGINALIS RRNA SPEC QL NAA+PROBE: NEGATIVE
UROBILINOGEN UR STRIP.AUTO-MCNC: <2 MG/DL
WBC # BLD AUTO: 5.6 X10*3/UL (ref 4.4–11.3)
WBC #/AREA URNS AUTO: NORMAL /HPF
YEAST VAG WET PREP-#/AREA: NORMAL

## 2024-03-01 PROCEDURE — 2500000004 HC RX 250 GENERAL PHARMACY W/ HCPCS (ALT 636 FOR OP/ED): Performed by: EMERGENCY MEDICINE

## 2024-03-01 PROCEDURE — 74177 CT ABD & PELVIS W/CONTRAST: CPT | Performed by: RADIOLOGY

## 2024-03-01 PROCEDURE — 2500000004 HC RX 250 GENERAL PHARMACY W/ HCPCS (ALT 636 FOR OP/ED): Performed by: PHYSICIAN ASSISTANT

## 2024-03-01 PROCEDURE — 2500000004 HC RX 250 GENERAL PHARMACY W/ HCPCS (ALT 636 FOR OP/ED): Performed by: STUDENT IN AN ORGANIZED HEALTH CARE EDUCATION/TRAINING PROGRAM

## 2024-03-01 PROCEDURE — 99285 EMERGENCY DEPT VISIT HI MDM: CPT | Mod: 25

## 2024-03-01 PROCEDURE — G0378 HOSPITAL OBSERVATION PER HR: HCPCS

## 2024-03-01 PROCEDURE — 81001 URINALYSIS AUTO W/SCOPE: CPT | Performed by: PHYSICIAN ASSISTANT

## 2024-03-01 PROCEDURE — 2500000001 HC RX 250 WO HCPCS SELF ADMINISTERED DRUGS (ALT 637 FOR MEDICARE OP): Performed by: STUDENT IN AN ORGANIZED HEALTH CARE EDUCATION/TRAINING PROGRAM

## 2024-03-01 PROCEDURE — 96361 HYDRATE IV INFUSION ADD-ON: CPT

## 2024-03-01 PROCEDURE — 85652 RBC SED RATE AUTOMATED: CPT | Performed by: PHYSICIAN ASSISTANT

## 2024-03-01 PROCEDURE — 99223 1ST HOSP IP/OBS HIGH 75: CPT | Performed by: STUDENT IN AN ORGANIZED HEALTH CARE EDUCATION/TRAINING PROGRAM

## 2024-03-01 PROCEDURE — 83605 ASSAY OF LACTIC ACID: CPT | Performed by: PHYSICIAN ASSISTANT

## 2024-03-01 PROCEDURE — 96374 THER/PROPH/DIAG INJ IV PUSH: CPT

## 2024-03-01 PROCEDURE — 96376 TX/PRO/DX INJ SAME DRUG ADON: CPT

## 2024-03-01 PROCEDURE — 2550000001 HC RX 255 CONTRASTS: Performed by: PHYSICIAN ASSISTANT

## 2024-03-01 PROCEDURE — 36415 COLL VENOUS BLD VENIPUNCTURE: CPT | Performed by: PHYSICIAN ASSISTANT

## 2024-03-01 PROCEDURE — 83690 ASSAY OF LIPASE: CPT | Performed by: PHYSICIAN ASSISTANT

## 2024-03-01 PROCEDURE — 96375 TX/PRO/DX INJ NEW DRUG ADDON: CPT

## 2024-03-01 PROCEDURE — 74177 CT ABD & PELVIS W/CONTRAST: CPT

## 2024-03-01 PROCEDURE — 86140 C-REACTIVE PROTEIN: CPT | Performed by: PHYSICIAN ASSISTANT

## 2024-03-01 PROCEDURE — 94760 N-INVAS EAR/PLS OXIMETRY 1: CPT

## 2024-03-01 PROCEDURE — 84702 CHORIONIC GONADOTROPIN TEST: CPT | Performed by: PHYSICIAN ASSISTANT

## 2024-03-01 PROCEDURE — 85025 COMPLETE CBC W/AUTO DIFF WBC: CPT | Performed by: PHYSICIAN ASSISTANT

## 2024-03-01 PROCEDURE — 87086 URINE CULTURE/COLONY COUNT: CPT | Mod: GEALAB | Performed by: PHYSICIAN ASSISTANT

## 2024-03-01 PROCEDURE — 80053 COMPREHEN METABOLIC PANEL: CPT | Performed by: PHYSICIAN ASSISTANT

## 2024-03-01 RX ORDER — MORPHINE SULFATE 2 MG/ML
2 INJECTION, SOLUTION INTRAMUSCULAR; INTRAVENOUS EVERY 4 HOURS PRN
Status: DISCONTINUED | OUTPATIENT
Start: 2024-03-01 | End: 2024-03-02

## 2024-03-01 RX ORDER — FLUOXETINE HYDROCHLORIDE 20 MG/1
40 CAPSULE ORAL NIGHTLY
Status: DISCONTINUED | OUTPATIENT
Start: 2024-03-01 | End: 2024-03-02 | Stop reason: HOSPADM

## 2024-03-01 RX ORDER — POLYETHYLENE GLYCOL 3350 17 G/17G
17 POWDER, FOR SOLUTION ORAL DAILY
Status: DISCONTINUED | OUTPATIENT
Start: 2024-03-01 | End: 2024-03-02 | Stop reason: HOSPADM

## 2024-03-01 RX ORDER — ONDANSETRON 4 MG/1
4 TABLET, ORALLY DISINTEGRATING ORAL EVERY 8 HOURS PRN
Status: DISCONTINUED | OUTPATIENT
Start: 2024-03-01 | End: 2024-03-02 | Stop reason: HOSPADM

## 2024-03-01 RX ORDER — MORPHINE SULFATE 4 MG/ML
4 INJECTION INTRAVENOUS EVERY 4 HOURS PRN
Status: DISCONTINUED | OUTPATIENT
Start: 2024-03-01 | End: 2024-03-02

## 2024-03-01 RX ORDER — MIRTAZAPINE 15 MG/1
7.5 TABLET, FILM COATED ORAL NIGHTLY
Status: DISCONTINUED | OUTPATIENT
Start: 2024-03-01 | End: 2024-03-02 | Stop reason: HOSPADM

## 2024-03-01 RX ORDER — MORPHINE SULFATE 4 MG/ML
4 INJECTION INTRAVENOUS ONCE
Status: COMPLETED | OUTPATIENT
Start: 2024-03-01 | End: 2024-03-01

## 2024-03-01 RX ORDER — CLONIDINE HYDROCHLORIDE 0.1 MG/1
0.2 TABLET ORAL NIGHTLY
Status: DISCONTINUED | OUTPATIENT
Start: 2024-03-01 | End: 2024-03-02 | Stop reason: HOSPADM

## 2024-03-01 RX ORDER — PREDNISONE 20 MG/1
40 TABLET ORAL DAILY
Status: DISCONTINUED | OUTPATIENT
Start: 2024-03-02 | End: 2024-03-02 | Stop reason: HOSPADM

## 2024-03-01 RX ORDER — AMOXICILLIN 250 MG
1 CAPSULE ORAL NIGHTLY
Status: DISCONTINUED | OUTPATIENT
Start: 2024-03-01 | End: 2024-03-02 | Stop reason: HOSPADM

## 2024-03-01 RX ORDER — METHOTREXATE 2.5 MG/1
10 TABLET ORAL
Status: DISCONTINUED | OUTPATIENT
Start: 2024-03-04 | End: 2024-03-02 | Stop reason: HOSPADM

## 2024-03-01 RX ORDER — ONDANSETRON HYDROCHLORIDE 2 MG/ML
4 INJECTION, SOLUTION INTRAVENOUS ONCE
Status: COMPLETED | OUTPATIENT
Start: 2024-03-01 | End: 2024-03-01

## 2024-03-01 RX ORDER — POLYETHYLENE GLYCOL 3350 17 G/17G
17 POWDER, FOR SOLUTION ORAL DAILY PRN
Status: DISCONTINUED | OUTPATIENT
Start: 2024-03-02 | End: 2024-03-01

## 2024-03-01 RX ORDER — PREDNISONE 10 MG/1
10 TABLET ORAL DAILY
Status: DISCONTINUED | OUTPATIENT
Start: 2024-03-16 | End: 2024-03-02 | Stop reason: HOSPADM

## 2024-03-01 RX ORDER — PREDNISONE 20 MG/1
20 TABLET ORAL DAILY
Status: DISCONTINUED | OUTPATIENT
Start: 2024-03-09 | End: 2024-03-02 | Stop reason: HOSPADM

## 2024-03-01 RX ORDER — CHOLECALCIFEROL (VITAMIN D3) 25 MCG
1000 TABLET ORAL DAILY
Status: DISCONTINUED | OUTPATIENT
Start: 2024-03-01 | End: 2024-03-02 | Stop reason: HOSPADM

## 2024-03-01 RX ORDER — ENOXAPARIN SODIUM 100 MG/ML
40 INJECTION SUBCUTANEOUS EVERY 24 HOURS
Status: DISCONTINUED | OUTPATIENT
Start: 2024-03-01 | End: 2024-03-02 | Stop reason: HOSPADM

## 2024-03-01 RX ORDER — ACETAMINOPHEN 325 MG/1
650 TABLET ORAL EVERY 6 HOURS PRN
Status: DISCONTINUED | OUTPATIENT
Start: 2024-03-01 | End: 2024-03-02 | Stop reason: HOSPADM

## 2024-03-01 RX ORDER — FOLIC ACID 1 MG/1
1 TABLET ORAL DAILY
Status: DISCONTINUED | OUTPATIENT
Start: 2024-03-01 | End: 2024-03-02 | Stop reason: HOSPADM

## 2024-03-01 RX ADMIN — MIRTAZAPINE 7.5 MG: 15 TABLET, FILM COATED ORAL at 20:56

## 2024-03-01 RX ADMIN — IOHEXOL 75 ML: 350 INJECTION, SOLUTION INTRAVENOUS at 12:14

## 2024-03-01 RX ADMIN — ENOXAPARIN SODIUM 40 MG: 40 INJECTION SUBCUTANEOUS at 20:56

## 2024-03-01 RX ADMIN — Medication 1000 UNITS: at 20:56

## 2024-03-01 RX ADMIN — POLYETHYLENE GLYCOL 3350 17 G: 17 POWDER, FOR SOLUTION ORAL at 17:30

## 2024-03-01 RX ADMIN — MORPHINE SULFATE 4 MG: 4 INJECTION INTRAVENOUS at 18:31

## 2024-03-01 RX ADMIN — MORPHINE SULFATE 4 MG: 4 INJECTION INTRAVENOUS at 22:54

## 2024-03-01 RX ADMIN — MORPHINE SULFATE 4 MG: 4 INJECTION INTRAVENOUS at 14:52

## 2024-03-01 RX ADMIN — SODIUM CHLORIDE 1000 ML: 9 INJECTION, SOLUTION INTRAVENOUS at 11:53

## 2024-03-01 RX ADMIN — MORPHINE SULFATE 4 MG: 4 INJECTION INTRAVENOUS at 11:54

## 2024-03-01 RX ADMIN — ONDANSETRON 4 MG: 2 INJECTION INTRAMUSCULAR; INTRAVENOUS at 11:54

## 2024-03-01 RX ADMIN — CLONIDINE HYDROCHLORIDE 0.2 MG: 0.1 TABLET ORAL at 20:55

## 2024-03-01 RX ADMIN — FLUOXETINE HYDROCHLORIDE 40 MG: 20 CAPSULE ORAL at 20:56

## 2024-03-01 RX ADMIN — METHYLPREDNISOLONE SODIUM SUCCINATE 125 MG: 125 INJECTION, POWDER, FOR SOLUTION INTRAMUSCULAR; INTRAVENOUS at 15:54

## 2024-03-01 RX ADMIN — FOLIC ACID 1 MG: 1 TABLET ORAL at 20:56

## 2024-03-01 SDOH — SOCIAL STABILITY: SOCIAL INSECURITY: DO YOU FEEL UNSAFE GOING BACK TO THE PLACE WHERE YOU ARE LIVING?: NO

## 2024-03-01 SDOH — SOCIAL STABILITY: SOCIAL INSECURITY: DOES ANYONE TRY TO KEEP YOU FROM HAVING/CONTACTING OTHER FRIENDS OR DOING THINGS OUTSIDE YOUR HOME?: NO

## 2024-03-01 SDOH — SOCIAL STABILITY: SOCIAL INSECURITY: WERE YOU ABLE TO COMPLETE ALL THE BEHAVIORAL HEALTH SCREENINGS?: YES

## 2024-03-01 SDOH — SOCIAL STABILITY: SOCIAL INSECURITY: HAVE YOU HAD THOUGHTS OF HARMING ANYONE ELSE?: NO

## 2024-03-01 SDOH — SOCIAL STABILITY: SOCIAL INSECURITY: ABUSE: ADULT

## 2024-03-01 SDOH — SOCIAL STABILITY: SOCIAL INSECURITY: HAS ANYONE EVER THREATENED TO HURT YOUR FAMILY OR YOUR PETS?: NO

## 2024-03-01 SDOH — SOCIAL STABILITY: SOCIAL INSECURITY: ARE THERE ANY APPARENT SIGNS OF INJURIES/BEHAVIORS THAT COULD BE RELATED TO ABUSE/NEGLECT?: NO

## 2024-03-01 SDOH — SOCIAL STABILITY: SOCIAL INSECURITY: ARE YOU OR HAVE YOU BEEN THREATENED OR ABUSED PHYSICALLY, EMOTIONALLY, OR SEXUALLY BY ANYONE?: NO

## 2024-03-01 SDOH — SOCIAL STABILITY: SOCIAL INSECURITY: DO YOU FEEL ANYONE HAS EXPLOITED OR TAKEN ADVANTAGE OF YOU FINANCIALLY OR OF YOUR PERSONAL PROPERTY?: NO

## 2024-03-01 ASSESSMENT — ACTIVITIES OF DAILY LIVING (ADL)
FEEDING YOURSELF: INDEPENDENT
DRESSING YOURSELF: INDEPENDENT
JUDGMENT_ADEQUATE_SAFELY_COMPLETE_DAILY_ACTIVITIES: YES
GROOMING: INDEPENDENT
WALKS IN HOME: INDEPENDENT
TOILETING: INDEPENDENT
HEARING - LEFT EAR: FUNCTIONAL
PATIENT'S MEMORY ADEQUATE TO SAFELY COMPLETE DAILY ACTIVITIES?: YES
ASSISTIVE_DEVICE: EYEGLASSES
HEARING - RIGHT EAR: FUNCTIONAL
ADEQUATE_TO_COMPLETE_ADL: YES
BATHING: INDEPENDENT

## 2024-03-01 ASSESSMENT — COGNITIVE AND FUNCTIONAL STATUS - GENERAL
DAILY ACTIVITIY SCORE: 24
DAILY ACTIVITIY SCORE: 24
MOBILITY SCORE: 24
PATIENT BASELINE BEDBOUND: NO
MOBILITY SCORE: 24

## 2024-03-01 ASSESSMENT — PAIN DESCRIPTION - ORIENTATION: ORIENTATION: RIGHT

## 2024-03-01 ASSESSMENT — ENCOUNTER SYMPTOMS
SINUS PRESSURE: 0
MYALGIAS: 0
CONSTIPATION: 0
WHEEZING: 0
LIGHT-HEADEDNESS: 0
VOMITING: 0
DYSPHORIC MOOD: 0
CHILLS: 0
SINUS PAIN: 0
NUMBNESS: 0
DIARRHEA: 0
PALPITATIONS: 0
FREQUENCY: 0
NAUSEA: 1
RHINORRHEA: 0
DYSURIA: 0
FEVER: 0
COUGH: 0
SHORTNESS OF BREATH: 0
WOUND: 0
NERVOUS/ANXIOUS: 0
FATIGUE: 1
ARTHRALGIAS: 1

## 2024-03-01 ASSESSMENT — PATIENT HEALTH QUESTIONNAIRE - PHQ9
1. LITTLE INTEREST OR PLEASURE IN DOING THINGS: NOT AT ALL
2. FEELING DOWN, DEPRESSED OR HOPELESS: NOT AT ALL
SUM OF ALL RESPONSES TO PHQ9 QUESTIONS 1 & 2: 0

## 2024-03-01 ASSESSMENT — PAIN - FUNCTIONAL ASSESSMENT
PAIN_FUNCTIONAL_ASSESSMENT: 0-10

## 2024-03-01 ASSESSMENT — LIFESTYLE VARIABLES
HOW MANY STANDARD DRINKS CONTAINING ALCOHOL DO YOU HAVE ON A TYPICAL DAY: 1 OR 2
HOW OFTEN DO YOU HAVE A DRINK CONTAINING ALCOHOL: MONTHLY OR LESS
EVER FELT BAD OR GUILTY ABOUT YOUR DRINKING: NO
SKIP TO QUESTIONS 9-10: 1
AUDIT-C TOTAL SCORE: 1
EVER HAD A DRINK FIRST THING IN THE MORNING TO STEADY YOUR NERVES TO GET RID OF A HANGOVER: NO
HOW OFTEN DO YOU HAVE 6 OR MORE DRINKS ON ONE OCCASION: NEVER
AUDIT-C TOTAL SCORE: 1
HAVE PEOPLE ANNOYED YOU BY CRITICIZING YOUR DRINKING: NO
HAVE YOU EVER FELT YOU SHOULD CUT DOWN ON YOUR DRINKING: NO

## 2024-03-01 ASSESSMENT — PAIN SCALES - GENERAL
PAINLEVEL_OUTOF10: 3
PAINLEVEL_OUTOF10: 10 - WORST POSSIBLE PAIN
PAINLEVEL_OUTOF10: 8
PAINLEVEL_OUTOF10: 10 - WORST POSSIBLE PAIN
PAINLEVEL_OUTOF10: 5 - MODERATE PAIN

## 2024-03-01 ASSESSMENT — PAIN DESCRIPTION - LOCATION
LOCATION: ABDOMEN
LOCATION: ABDOMEN

## 2024-03-01 NOTE — H&P
History Of Present Illness  Allyssa Kelly is a 24 y.o. female presenting with abdominal pain.    Patient reports pain started around 2000 on 2/28/24, cam to [facility] around 2300 on 2/28/24.  Initially thought appendicitis in the ER, however non-revealing imaging and patient was sent home.      Patient came back to [facility] ED on 3/1/24.  Still with same abdominal pain.  Patient has been tolerating PO liquids.  Has not been eating due to persistent nausea.  Patient also reports chronic joint pains.  Has not had a menstrual period since December 1, 2023.     Past Medical History  Past Medical History:   Diagnosis Date    Acute pharyngitis, unspecified 07/21/2014    Sore throat    Acute pharyngitis, unspecified 04/18/2017    Sore throat    Acute tonsillitis, unspecified 03/22/2021    Acute tonsillitis, unspecified    Allergy, unspecified, initial encounter 10/08/2021    Hypersensitivity    Anxiety     Arthralgia of temporomandibular joint, unspecified side 04/07/2014    Temporomandibular joint pain    Body mass index (BMI) 19.9 or less, adult 09/17/2021    Body mass index (BMI) of 19.9 or less in adult    Body mass index (BMI) pediatric, 5th percentile to less than 85th percentile for age 09/17/2021    BMI (body mass index), pediatric, 5% to less than 85% for age    Calculus of kidney with calculus of ureter 02/12/2022    Calculus of kidney with calculus of ureter    Candidiasis of skin and nail 08/31/2020    Candidal skin infection    Cellulitis of left toe 04/06/2018    Cellulitis of fifth toe of left foot    Contact with and (suspected) exposure to other viral communicable diseases 02/13/2020    Exposure to influenza    COVID-19 12/30/2020    COVID-19 virus infection    Displaced fracture of shaft of third metacarpal bone, left hand, initial encounter for closed fracture 12/05/2016    Closed displaced fracture of shaft of third metacarpal bone of left hand, initial encounter    Encounter for general adult medical  examination without abnormal findings 09/17/2021    Examination, routine, over 18 years of age    Encounter for pre-employment examination 09/17/2021    Encounter for pre-employment health screening examination    Encounter for routine child health examination without abnormal findings 10/03/2018    Encounter for routine child health examination without abnormal findings    Hordeolum externum right upper eyelid 11/19/2021    Hordeolum externum of right upper eyelid    Jaw pain 10/07/2020    Chronic jaw pain    Joint derangement, unspecified 10/15/2021    Hypermobility of joint    Local infection of the skin and subcutaneous tissue, unspecified 04/26/2022    Infection of skin of finger    Localized swelling, mass and lump, head 04/28/2017    Tongue swelling    Nonscarring hair loss, unspecified 10/03/2018    Hair thinning    Other chronic diseases of tonsils and adenoids 04/28/2017    Cryptic tonsil    Other conditions influencing health status 11/18/2021    Encounter for procedure    Other diseases of tongue 04/28/2017    Tongue lesion    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    Other specified respiratory disorders 02/18/2019    Viral respiratory illness    Pain in left arm 11/22/2021    Pain of left upper extremity    Pain in right ankle and joints of right foot 09/23/2021    Right ankle pain    Pain in right wrist 06/08/2016    Right wrist pain    Pain in unspecified knee 09/30/2014    Joint pain, knee    Pain in unspecified shoulder 10/27/2014    Shoulder pain    Pain in unspecified wrist 11/22/2021    Chronic wrist pain    Panic attacks     Personal history of diseases of the skin and subcutaneous tissue 08/12/2017    History of telogen effluvium    Personal history of diseases of the skin and subcutaneous tissue 12/30/2020    History of pilonidal cyst    Personal history of diseases of the skin and subcutaneous tissue 04/06/2015    History of contact dermatitis    Personal  history of other (healed) physical injury and trauma 11/19/2018    History of insect bite    Personal history of other diseases of the digestive system 02/13/2020    History of gastroenteritis    Personal history of other diseases of the female genital tract 08/12/2017    History of dysmenorrhea    Personal history of other diseases of the musculoskeletal system and connective tissue 12/14/2017    History of low back pain    Personal history of other diseases of the musculoskeletal system and connective tissue 05/10/2022    History of muscle pain    Personal history of other diseases of the respiratory system 11/19/2021    History of sore throat    Personal history of other diseases of the respiratory system 01/17/2017    History of acute sinusitis    Personal history of other diseases of the respiratory system 02/18/2019    History of sore throat    Personal history of other diseases of the respiratory system 07/21/2014    History of pharyngitis    Personal history of other diseases of the respiratory system 01/07/2015    History of influenza    Personal history of other diseases of the respiratory system 01/17/2017    History of acute sinusitis    Personal history of other diseases of urinary system 02/12/2022    History of hematuria    Personal history of other infectious and parasitic diseases 07/21/2014    History of viral infection    Personal history of other specified conditions 02/12/2022    History of dysuria    Personal history of other specified conditions 02/12/2022    History of dysuria    Personal history of other specified conditions 09/20/2021    History of insomnia    Personal history of other specified conditions 10/15/2021    History of urinary urgency    Personal history of other specified conditions 10/15/2021    History of urinary frequency    Personal history of other specified conditions 04/05/2021    History of weight loss    Personal history of other specified conditions 02/18/2019     History of fever    Personal history of other specified conditions 03/15/2021    History of nasal congestion    Personal history of other specified conditions 02/21/2020    History of diarrhea    Personal history of other specified conditions 12/01/2016    History of abdominal pain    Personal history of other specified conditions 01/07/2015    History of vomiting    Personal history of other specified conditions 01/07/2015    History of fever    PONV (postoperative nausea and vomiting)     WEARS PATCH    Premenstrual dysphoric disorder 01/25/2016    PMDD (premenstrual dysphoric disorder)    Raised antibody titer 02/24/2020    Elevated EBV antibody titer    Right lower quadrant pain 02/21/2020    Bilateral lower abdominal pain    Sprain of interphalangeal joint of right middle finger, initial encounter 03/06/2018    Sprain of interphalangeal joint of right middle finger, initial encounter    Strain of unspecified muscle(s) and tendon(s) at lower leg level, left leg, initial encounter 12/10/2018    Strain of left knee    Superficial mycosis, unspecified 10/17/2015    Fungal rash of trunk    Tic disorder, unspecified 11/01/2021    Tic disorder    TMJ (dislocation of temporomandibular joint)     Unspecified abdominal pain 12/01/2016    Abdominal pain, acute    Unspecified acute conjunctivitis, left eye 12/01/2016    Acute conjunctivitis, left eye    Unspecified conjunctivitis 11/13/2015    Conjunctivitis, left eye    Unspecified fracture of left wrist and hand, initial encounter for closed fracture 12/29/2016    Fracture of left hand, closed, initial encounter    Unspecified injury of left wrist, hand and finger(s), initial encounter 12/03/2016    Injury of left hand, initial encounter    Unspecified injury of right lower leg, initial encounter 12/07/2015    Right knee injury    Unspecified injury of right wrist, hand and finger(s), initial encounter 03/28/2017    Injury of right hand, initial encounter    Unspecified  injury of right wrist, hand and finger(s), initial encounter 03/06/2018    Injury of finger of right hand, initial encounter    Unspecified injury of unspecified foot, initial encounter 05/02/2015    Foot injury    Unspecified injury of unspecified wrist, hand and finger(s), initial encounter     Finger injury    Unspecified renal colic 02/12/2022    Ureteral colic    Vomiting, unspecified 02/25/2020    Intermittent vomiting       Surgical History  Past Surgical History:   Procedure Laterality Date    OTHER SURGICAL HISTORY  12/11/2020    Foot surgery    OTHER SURGICAL HISTORY  10/19/2020    Wrist surgery    OTHER SURGICAL HISTORY  03/25/2022    Temporomandibular joint surgery    OTHER SURGICAL HISTORY  09/16/2020    Surgery    OTHER SURGICAL HISTORY  05/07/2021    Arthrocentesis (Therapeutic)        Social History  She reports that she has never smoked. She has never used smokeless tobacco. She reports current alcohol use. She reports that she does not use drugs.    Family History  Family History   Problem Relation Name Age of Onset    Anxiety disorder Mother      Hodgkin's lymphoma Mother      Breast cancer Mother      Hypertension Father      Other (PATENT DUCTUS ARTERIOSUS) Father      Other (TIC DISORDER) Father      Lupus Father's Sister      Anxiety disorder Father's Brother      Anxiety disorder Maternal Grandmother      Anxiety disorder Maternal Grandfather      Lupus Paternal Grandmother      Hypertension Paternal Grandfather          Allergies  Tretinoin    Review of Systems   Constitutional:  Positive for fatigue. Negative for chills and fever.   HENT:  Negative for congestion, rhinorrhea, sinus pressure and sinus pain.    Eyes:  Negative for visual disturbance.   Respiratory:  Negative for cough, shortness of breath and wheezing.    Cardiovascular:  Negative for chest pain, palpitations and leg swelling.   Gastrointestinal:  Positive for nausea. Negative for constipation, diarrhea and vomiting.  "  Genitourinary:  Positive for menstrual problem (amenorrhea.). Negative for dysuria, frequency and urgency.   Musculoskeletal:  Positive for arthralgias. Negative for myalgias.   Skin:  Positive for rash. Negative for pallor and wound.   Neurological:  Negative for light-headedness and numbness.   Psychiatric/Behavioral:  Negative for dysphoric mood. The patient is not nervous/anxious.         Physical Exam  Constitutional:       Appearance: Normal appearance.   HENT:      Head: Normocephalic and atraumatic.      Right Ear: External ear normal.      Left Ear: External ear normal.      Nose: Nose normal.      Mouth/Throat:      Mouth: Mucous membranes are moist.      Pharynx: Oropharynx is clear.   Eyes:      Conjunctiva/sclera: Conjunctivae normal.   Cardiovascular:      Rate and Rhythm: Normal rate and regular rhythm.      Pulses: Normal pulses.      Heart sounds: Normal heart sounds.   Pulmonary:      Effort: Pulmonary effort is normal.      Breath sounds: Normal breath sounds.   Abdominal:      General: There is no distension.      Palpations: Abdomen is soft.      Tenderness: There is abdominal tenderness (RLQ).   Skin:     General: Skin is warm and dry.      Findings: Rash (mild erythema of bilateral maxillary region) present.   Neurological:      Mental Status: She is alert and oriented to person, place, and time.   Psychiatric:         Mood and Affect: Mood normal.         Behavior: Behavior normal.          Last Recorded Vitals  Blood pressure (!) 149/96, pulse 93, temperature 37.1 °C (98.8 °F), resp. rate 18, height 1.753 m (5' 9\"), weight 72.6 kg (160 lb), SpO2 96 %.    Relevant Results  Scheduled medications  cholecalciferol, 1,000 Units, oral, Daily  cloNIDine, 0.2 mg, oral, Nightly  enoxaparin, 40 mg, subcutaneous, q24h  FLUoxetine, 40 mg, oral, Nightly  folic acid, 1 mg, oral, Daily  [START ON 3/4/2024] methotrexate, 10 mg, oral, Weekly  [Held by provider] methylPREDNISolone sodium succinate (PF), 375 " mg, intravenous, Once  mirtazapine, 7.5 mg, oral, Nightly  polyethylene glycol, 17 g, oral, Daily  [START ON 3/2/2024] predniSONE, 40 mg, oral, Daily   Followed by  [START ON 3/9/2024] predniSONE, 20 mg, oral, Daily   Followed by  [START ON 3/16/2024] predniSONE, 10 mg, oral, Daily      Continuous medications     PRN medications  PRN medications: acetaminophen, morphine, morphine, ondansetron ODT    Results for orders placed or performed during the hospital encounter of 03/01/24 (from the past 24 hour(s))   CBC and Auto Differential   Result Value Ref Range    WBC 5.6 4.4 - 11.3 x10*3/uL    nRBC 0.0 0.0 - 0.0 /100 WBCs    RBC 5.14 4.00 - 5.20 x10*6/uL    Hemoglobin 14.3 12.0 - 16.0 g/dL    Hematocrit 43.2 36.0 - 46.0 %    MCV 84 80 - 100 fL    MCH 27.8 26.0 - 34.0 pg    MCHC 33.1 32.0 - 36.0 g/dL    RDW 13.6 11.5 - 14.5 %    Platelets 231 150 - 450 x10*3/uL    Neutrophils % 52.4 40.0 - 80.0 %    Immature Granulocytes %, Automated 0.2 0.0 - 0.9 %    Lymphocytes % 33.5 13.0 - 44.0 %    Monocytes % 7.6 2.0 - 10.0 %    Eosinophils % 5.6 0.0 - 6.0 %    Basophils % 0.7 0.0 - 2.0 %    Neutrophils Absolute 2.92 1.20 - 7.70 x10*3/uL    Immature Granulocytes Absolute, Automated 0.01 0.00 - 0.70 x10*3/uL    Lymphocytes Absolute 1.86 1.20 - 4.80 x10*3/uL    Monocytes Absolute 0.42 0.10 - 1.00 x10*3/uL    Eosinophils Absolute 0.31 0.00 - 0.70 x10*3/uL    Basophils Absolute 0.04 0.00 - 0.10 x10*3/uL   Lipase   Result Value Ref Range    Lipase 17 9 - 82 U/L   Lactate   Result Value Ref Range    Lactate 0.5 0.4 - 2.0 mmol/L   Comprehensive metabolic panel   Result Value Ref Range    Glucose 84 74 - 99 mg/dL    Sodium 136 136 - 145 mmol/L    Potassium 4.0 3.5 - 5.3 mmol/L    Chloride 102 98 - 107 mmol/L    Bicarbonate 26 21 - 32 mmol/L    Anion Gap 12 10 - 20 mmol/L    Urea Nitrogen 12 6 - 23 mg/dL    Creatinine 0.67 0.50 - 1.05 mg/dL    eGFR >90 >60 mL/min/1.73m*2    Calcium 9.2 8.6 - 10.3 mg/dL    Albumin 4.4 3.4 - 5.0 g/dL     Alkaline Phosphatase 85 33 - 110 U/L    Total Protein 7.2 6.4 - 8.2 g/dL    AST 20 9 - 39 U/L    Bilirubin, Total 0.3 0.0 - 1.2 mg/dL    ALT 19 7 - 45 U/L   C-Reactive Protein   Result Value Ref Range    C-Reactive Protein 0.10 <1.00 mg/dL   Sedimentation Rate   Result Value Ref Range    Sedimentation Rate 17 0 - 20 mm/h   Human Chorionic Gonadotropin, Serum Quantitative   Result Value Ref Range    HCG, Beta-Quantitative <2 <5 mIU/mL   Urinalysis with Reflex Culture and Microscopic   Result Value Ref Range    Color, Urine Straw Straw, Yellow    Appearance, Urine Clear Clear    Specific Gravity, Urine 1.025 1.005 - 1.035    pH, Urine 7.0 5.0, 5.5, 6.0, 6.5, 7.0, 7.5, 8.0    Protein, Urine NEGATIVE NEGATIVE mg/dL    Glucose, Urine NEGATIVE NEGATIVE mg/dL    Blood, Urine NEGATIVE NEGATIVE    Ketones, Urine NEGATIVE NEGATIVE mg/dL    Bilirubin, Urine NEGATIVE NEGATIVE    Urobilinogen, Urine <2.0 <2.0 mg/dL    Nitrite, Urine NEGATIVE NEGATIVE    Leukocyte Esterase, Urine TRACE (A) NEGATIVE   Microscopic Only, Urine   Result Value Ref Range    WBC, Urine 1-5 1-5, NONE /HPF    RBC, Urine NONE NONE, 1-2, 3-5 /HPF    Squamous Epithelial Cells, Urine 1-9 (SPARSE) Reference range not established. /HPF     CT abdomen pelvis w IV contrast    Result Date: 3/1/2024  Interpreted By:  Dinora Rockwell, STUDY: CT ABDOMEN PELVIS W IV CONTRAST;  3/1/2024 12:14 pm   INDICATION: Signs/Symptoms:RLQ pain.   COMPARISON: None.   ACCESSION NUMBER(S): [id number]   ORDERING CLINICIAN: VIPIN OBRIEN   TECHNIQUE: CT of the abdomen and pelvis was performed.  Standard contiguous axial images were obtained at 3 mm slice thickness through the abdomen and pelvis. Coronal and sagittal reconstructions at 3 mm slice thickness were performed.   75 ml of contrast Omnipaque 350 were administered intravenously without immediate complication.   FINDINGS: LOWER CHEST: The visualized bases are clear bilaterally.   ABDOMEN:   LIVER: Liver is normal in size. No  focal lesions are seen.   BILE DUCTS: Biliary system is nondilated.   GALLBLADDER: .   PANCREAS: No focal lesions. No peripancreatic fat stranding.   SPLEEN: The spleen is normal in size. No focal abnormalities are seen.   ADRENAL GLANDS: The adrenal glands bilaterally within normal limits.   KIDNEYS AND URETERS: No hydronephrosis or renal masses. No perinephric stranding. No radiodense renal calculi.   PELVIS:   BLADDER: Within normal limits as distended. No bladder calculi or masses.   REPRODUCTIVE ORGANS: No pelvic free fluid, masses or lymphadenopathy. Follicular changes within bilateral ovaries. Intrauterine device in place. No uterine masses within limitation of this exam.   BOWEL: The small and large bowel are nondilated.  The visualized portions of the appendix are within normal limits. No pericecal inflammatory changes are seen to suggest acute appendicitis. Retained fecal material throughout the colon consistent with constipation. No mesenteric edema or lymphadenopathy.   VESSELS: Aorta and IVC within normal limits as visualized in this exam. No signs of aortic aneurysm.   PERITONEUM/RETROPERITONEUM/LYMPH NODES: No retroperitoneal masses are seen.  No lymphadenopathy.   BONES AND ABDOMINAL WALL: There is no evidence for destructive lytic or blastic bone lesions identified.  No abdominal wall masses or hernias are identified.       1.  Constipation. No bowel obstruction or free air. 2. Normal appendix. 3. Intrauterine device in place, satisfactory in position. 4. Follicular changes within bilateral ovaries. No free fluid in the pelvis. No pelvic masses. 5. Additional detailed findings as above.     Signed by: Dinora Rockwell 3/1/2024 12:48 PM Dictation workstation:   [id number]    Vascular US abdomen/pelvis duplex complete    Result Date: 2/29/2024  STUDY: Pelvic Ultrasound; 4:27 AM INDICATION: Right lower quadrant pain tonight.  Evaluate for torsion.  IUD 12/23. LMP 12/23. COMPARISON: CT AP 2/29/2024.  ACCESSION NUMBER(S): [id number] ORDERING CLINICIAN: TULIO FIELD TECHNIQUE: Transabdominal and transvaginal ultrasonography of the pelvis was performed with spectral Doppler evaluation of the ovaries. FINDINGS: UTERUS:   The uterus is retroverted in position and measures 6.2 x 3.1 x 4.0 cm.  The uterus demonstrates a normal, homogeneous echotexture.  There are no discrete fibroids present.  IUD is visualized. ENDOMETRIUM: The endometrium measures 0.5 cm.  RIGHT OVARY: The right ovary measures 3.7 x 2.0 x 2.0 cm.  The right ovary demonstrates several follicles.  Spectral Doppler evaluation of the ovary was performed.  Normal color and spectral Doppler flow is visualized.  LEFT OVARY: The left ovary measures 4.4 x 1.3 x 2.0 cm.  The left ovary demonstrates several follicles.  Spectral Doppler evaluation of the ovary was performed.  Normal color and spectral Doppler flow is visualized.   OTHER: A small amount of fluid is present within the cul-de-sac.    Unremarkable ultrasound of the pelvis. Unremarkable ultrasound of the pelvis. Signed by Shadi Saleem    US PELVIS TRANSABDOMINAL WITH TRANSVAGINAL    Result Date: 2/29/2024  STUDY: Pelvic Ultrasound; 4:27 AM INDICATION: Right lower quadrant pain tonight.  Evaluate for torsion.  IUD 12/23. LMP 12/23. COMPARISON: CT AP 2/29/2024. ACCESSION NUMBER(S): [id number] ORDERING CLINICIAN: TULIO FIELD TECHNIQUE: Transabdominal and transvaginal ultrasonography of the pelvis was performed with spectral Doppler evaluation of the ovaries. FINDINGS: UTERUS:   The uterus is retroverted in position and measures 6.2 x 3.1 x 4.0 cm.  The uterus demonstrates a normal, homogeneous echotexture.  There are no discrete fibroids present.  IUD is visualized. ENDOMETRIUM: The endometrium measures 0.5 cm.  RIGHT OVARY: The right ovary measures 3.7 x 2.0 x 2.0 cm.  The right ovary demonstrates several follicles.  Spectral Doppler evaluation of the ovary was performed.  Normal color and  spectral Doppler flow is visualized.  LEFT OVARY: The left ovary measures 4.4 x 1.3 x 2.0 cm.  The left ovary demonstrates several follicles.  Spectral Doppler evaluation of the ovary was performed.  Normal color and spectral Doppler flow is visualized.   OTHER: A small amount of fluid is present within the cul-de-sac.    Unremarkable ultrasound of the pelvis. Unremarkable ultrasound of the pelvis. Signed by Shadi Saleem    CT abdomen pelvis w IV contrast    Result Date: 2/29/2024  STUDY: CT Abdomen and Pelvis with IV Contrast; 2/29/2024 at 2:02 AM INDICATION: Periumbilical and right lower quadrant abdominal pain. COMPARISON: None available. ACCESSION NUMBER(S): [id number] ORDERING CLINICIAN: TULIO FIELD TECHNIQUE: CT of the abdomen and pelvis was performed.  Contiguous axial images were obtained at 3 mm slice thickness through the abdomen and pelvis. Coronal and sagittal reconstructions at 3 mm slice thickness were performed.  Omnipaque 350 75 mL was administered intravenously.  FINDINGS: LOWER CHEST: Cardiac size is normal.  No pericardial effusion.  Lung bases are clear.  ABDOMEN:  LIVER: No hepatomegaly.  Smooth surface contour.  Normal attenuation.  BILE DUCTS: No intrahepatic or extrahepatic biliary ductal dilatation.  GALLBLADDER: The gallbladder is unremarkable. STOMACH: No abnormalities identified.  PANCREAS: No masses or ductal dilatation.  SPLEEN: No splenomegaly or focal splenic lesion.  ADRENAL GLANDS: No thickening or nodules.  KIDNEYS AND URETERS: Kidneys are normal in size and location.  No renal or ureteral calculi.  PELVIS:  BLADDER: No abnormalities identified.  REPRODUCTIVE ORGANS: Uterus is retroverted with an intrauterine device in place.  BOWEL: Appendix appears normal.  Terminal ileum is unremarkable.  No abnormalities identified.  VESSELS: No abnormalities identified.  Abdominal aorta is normal in caliber.  PERITONEUM/RETROPERITONEUM/LYMPH NODES: No free fluid.  No pneumoperitoneum. No  lymphadenopathy.  ABDOMINAL WALL: No abnormalities identified. SOFT TISSUES: No abnormalities identified.  BONES: No acute fracture or aggressive osseous lesion.    No acute intra-abdominal pathology identified. Signed by Shadi Saleem        Assessment/Plan   Principal Problem:    Abdominal pain      Enrique Kelly is a 24 year old female with past medical history of cutaneous lupus and tic disorder who presents with intractable abdominal pain.     Abdominal pain  - pain management: patient not tolerating much PO, will use acetaminophen 650 mg mild, morphine 2 mg moderate, 4 mg severe  - Zofran ODT PRN nausea  - ordered bowel regimen with miralax and doc-senna  - possible constipation on imaging, although patient reports having a large solid bowel movement after her CT scan.   - patient has not had a menstrual period for past 3 months, multiple negative urine and serum pregnancy tests in that time period.  This could play a role in her abdominal pain, however mostly normal imaging of uterus and adnexa makes this less likely.      Rash  - could represent lupus flare, although patient has had angioedema in the past as well  - continue home methotrexate and folic acid, next methotrexate due 3/4/24  - patient improved significantly sp 125 mg solu-medrol IV in ER  - discussed with patient rheumatologist, who suggested 20 day prednisone taper: 40 mg every day x7 days, 20 mg every day x7 days, 10 mg every day x7 days.    Insomnia  - home Clonidine ER, Remeron    Anxiety  - home prozac    DVT PPX: Lovenox  Diet: regular  Dispo: Plan for overnight obs for pain management, home with prednisone taper.              Eulalio Desai DO

## 2024-03-01 NOTE — ED PROVIDER NOTES
HPI   Chief Complaint   Patient presents with    Abdominal Pain     Pt c/o rt sided abd pain with nausea since Wed.       This is a 24-year-old female with PMH SLE presenting for evaluation of 2-day history of sudden onset right lower quadrant abdominal pain radiating to the left lower quadrant described as sharp worse with palpation alleviated with rest.  Associated vomiting difficulty tolerating p.o.  Was seen here Wednesday evening and underwent CT imaging and ultrasound, no appendicitis or torsion noted but free pelvic fluid was noted at that time and was discharged with antibiotics for UTI.  Follow-up with OB/GYN was fairly unrevealing.  Patient continues to have pain and difficulty tolerating p.o. so she was brought in today for further assessment.  Patient notes that she has bilateral diffuse facial swelling which is new for her and feels she is developing a malar rash consistent with a lupus flare.  She is on methotrexate. Denies fevers, chills, chest pain, shortness of breath, diarrhea, constipation, hematochezia, melena, urinary symptoms, vaginal symptoms.      History provided by:  Patient and parent   used: No                        Yuri Coma Scale Score: 15                     Patient History   Past Medical History:   Diagnosis Date    Acute pharyngitis, unspecified 07/21/2014    Sore throat    Acute pharyngitis, unspecified 04/18/2017    Sore throat    Acute tonsillitis, unspecified 03/22/2021    Acute tonsillitis, unspecified    Allergy, unspecified, initial encounter 10/08/2021    Hypersensitivity    Anxiety     Arthralgia of temporomandibular joint, unspecified side 04/07/2014    Temporomandibular joint pain    Body mass index (BMI) 19.9 or less, adult 09/17/2021    Body mass index (BMI) of 19.9 or less in adult    Body mass index (BMI) pediatric, 5th percentile to less than 85th percentile for age 09/17/2021    BMI (body mass index), pediatric, 5% to less than 85% for age     Calculus of kidney with calculus of ureter 02/12/2022    Calculus of kidney with calculus of ureter    Candidiasis of skin and nail 08/31/2020    Candidal skin infection    Cellulitis of left toe 04/06/2018    Cellulitis of fifth toe of left foot    Contact with and (suspected) exposure to other viral communicable diseases 02/13/2020    Exposure to influenza    COVID-19 12/30/2020    COVID-19 virus infection    Displaced fracture of shaft of third metacarpal bone, left hand, initial encounter for closed fracture 12/05/2016    Closed displaced fracture of shaft of third metacarpal bone of left hand, initial encounter    Encounter for general adult medical examination without abnormal findings 09/17/2021    Examination, routine, over 18 years of age    Encounter for pre-employment examination 09/17/2021    Encounter for pre-employment health screening examination    Encounter for routine child health examination without abnormal findings 10/03/2018    Encounter for routine child health examination without abnormal findings    Hordeolum externum right upper eyelid 11/19/2021    Hordeolum externum of right upper eyelid    Jaw pain 10/07/2020    Chronic jaw pain    Joint derangement, unspecified 10/15/2021    Hypermobility of joint    Local infection of the skin and subcutaneous tissue, unspecified 04/26/2022    Infection of skin of finger    Localized swelling, mass and lump, head 04/28/2017    Tongue swelling    Nonscarring hair loss, unspecified 10/03/2018    Hair thinning    Other chronic diseases of tonsils and adenoids 04/28/2017    Cryptic tonsil    Other conditions influencing health status 11/18/2021    Encounter for procedure    Other diseases of tongue 04/28/2017    Tongue lesion    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    Other specified respiratory disorders 02/18/2019    Viral respiratory illness    Pain in left arm 11/22/2021    Pain of left upper extremity    Pain in  right ankle and joints of right foot 09/23/2021    Right ankle pain    Pain in right wrist 06/08/2016    Right wrist pain    Pain in unspecified knee 09/30/2014    Joint pain, knee    Pain in unspecified shoulder 10/27/2014    Shoulder pain    Pain in unspecified wrist 11/22/2021    Chronic wrist pain    Panic attacks     Personal history of diseases of the skin and subcutaneous tissue 08/12/2017    History of telogen effluvium    Personal history of diseases of the skin and subcutaneous tissue 12/30/2020    History of pilonidal cyst    Personal history of diseases of the skin and subcutaneous tissue 04/06/2015    History of contact dermatitis    Personal history of other (healed) physical injury and trauma 11/19/2018    History of insect bite    Personal history of other diseases of the digestive system 02/13/2020    History of gastroenteritis    Personal history of other diseases of the female genital tract 08/12/2017    History of dysmenorrhea    Personal history of other diseases of the musculoskeletal system and connective tissue 12/14/2017    History of low back pain    Personal history of other diseases of the musculoskeletal system and connective tissue 05/10/2022    History of muscle pain    Personal history of other diseases of the respiratory system 11/19/2021    History of sore throat    Personal history of other diseases of the respiratory system 01/17/2017    History of acute sinusitis    Personal history of other diseases of the respiratory system 02/18/2019    History of sore throat    Personal history of other diseases of the respiratory system 07/21/2014    History of pharyngitis    Personal history of other diseases of the respiratory system 01/07/2015    History of influenza    Personal history of other diseases of the respiratory system 01/17/2017    History of acute sinusitis    Personal history of other diseases of urinary system 02/12/2022    History of hematuria    Personal history of other  infectious and parasitic diseases 07/21/2014    History of viral infection    Personal history of other specified conditions 02/12/2022    History of dysuria    Personal history of other specified conditions 02/12/2022    History of dysuria    Personal history of other specified conditions 09/20/2021    History of insomnia    Personal history of other specified conditions 10/15/2021    History of urinary urgency    Personal history of other specified conditions 10/15/2021    History of urinary frequency    Personal history of other specified conditions 04/05/2021    History of weight loss    Personal history of other specified conditions 02/18/2019    History of fever    Personal history of other specified conditions 03/15/2021    History of nasal congestion    Personal history of other specified conditions 02/21/2020    History of diarrhea    Personal history of other specified conditions 12/01/2016    History of abdominal pain    Personal history of other specified conditions 01/07/2015    History of vomiting    Personal history of other specified conditions 01/07/2015    History of fever    PONV (postoperative nausea and vomiting)     WEARS PATCH    Premenstrual dysphoric disorder 01/25/2016    PMDD (premenstrual dysphoric disorder)    Raised antibody titer 02/24/2020    Elevated EBV antibody titer    Right lower quadrant pain 02/21/2020    Bilateral lower abdominal pain    Sprain of interphalangeal joint of right middle finger, initial encounter 03/06/2018    Sprain of interphalangeal joint of right middle finger, initial encounter    Strain of unspecified muscle(s) and tendon(s) at lower leg level, left leg, initial encounter 12/10/2018    Strain of left knee    Superficial mycosis, unspecified 10/17/2015    Fungal rash of trunk    Tic disorder, unspecified 11/01/2021    Tic disorder    TMJ (dislocation of temporomandibular joint)     Unspecified abdominal pain 12/01/2016    Abdominal pain, acute    Unspecified  acute conjunctivitis, left eye 12/01/2016    Acute conjunctivitis, left eye    Unspecified conjunctivitis 11/13/2015    Conjunctivitis, left eye    Unspecified fracture of left wrist and hand, initial encounter for closed fracture 12/29/2016    Fracture of left hand, closed, initial encounter    Unspecified injury of left wrist, hand and finger(s), initial encounter 12/03/2016    Injury of left hand, initial encounter    Unspecified injury of right lower leg, initial encounter 12/07/2015    Right knee injury    Unspecified injury of right wrist, hand and finger(s), initial encounter 03/28/2017    Injury of right hand, initial encounter    Unspecified injury of right wrist, hand and finger(s), initial encounter 03/06/2018    Injury of finger of right hand, initial encounter    Unspecified injury of unspecified foot, initial encounter 05/02/2015    Foot injury    Unspecified injury of unspecified wrist, hand and finger(s), initial encounter     Finger injury    Unspecified renal colic 02/12/2022    Ureteral colic    Vomiting, unspecified 02/25/2020    Intermittent vomiting     Past Surgical History:   Procedure Laterality Date    OTHER SURGICAL HISTORY  12/11/2020    Foot surgery    OTHER SURGICAL HISTORY  10/19/2020    Wrist surgery    OTHER SURGICAL HISTORY  03/25/2022    Temporomandibular joint surgery    OTHER SURGICAL HISTORY  09/16/2020    Surgery    OTHER SURGICAL HISTORY  05/07/2021    Arthrocentesis (Therapeutic)     Family History   Problem Relation Name Age of Onset    Anxiety disorder Mother      Hodgkin's lymphoma Mother      Breast cancer Mother      Hypertension Father      Other (PATENT DUCTUS ARTERIOSUS) Father      Other (TIC DISORDER) Father      Lupus Father's Sister      Anxiety disorder Father's Brother      Anxiety disorder Maternal Grandmother      Anxiety disorder Maternal Grandfather      Lupus Paternal Grandmother      Hypertension Paternal Grandfather       Social History     Tobacco Use     Smoking status: Never    Smokeless tobacco: Never   Vaping Use    Vaping Use: Never used   Substance Use Topics    Alcohol use: Yes     Comment: social    Drug use: Never       Physical Exam   ED Triage Vitals [03/01/24 1045]   Temperature Heart Rate Respirations BP   37.1 °C (98.8 °F) 93 18 (!) 149/96      Pulse Ox Temp src Heart Rate Source Patient Position   96 % -- -- --      BP Location FiO2 (%)     -- --       Physical Exam    General: Vitals noted. Afebrile.  Appears uncomfortable.  Tearful.  EENT: There is bilateral facial swelling noted without significant rash.  Posterior oropharynx patent.  No tongue swelling.  Floor the mouth is soft and not swollen.  Sclerae anicteric  Cardiac: Regular rate and rhythm. No murmur  Pulmonary: Lungs clear bilaterally with good aeration  Abdomen: Soft.  TTP RLQ with guarding. No rebound.  : No CVA tenderness. exam deferred  Extremities: MEJIA normally  Skin: No rash on abdomen  Neuro: Alert and oriented    ED Course & MDM   ED Course as of 03/01/24 1638   Fri Mar 01, 2024   1343 Dr. Victoria recommends high intensity heparin with bolus regardless of bleeding. Recommends NSU at this time.  [TP]      ED Course User Index  [TP] Kenzie Nassar DO         Diagnoses as of 03/01/24 1638   Intractable abdominal pain       Medical Decision Making  DDx: Colitis, obstruction, abscess, appendicitis    Physical exam as above.  Tender to palpation with guarding.  Stable vitals.  Considered etiology of appendicitis possibly missed with evaluation within a couple of hours of onset this past Wednesday so discussed performing repeat CT and patient and mom are in agreement.  Repeat CT showed free pelvic fluid but no appendicitis or any other acute findings outside of constipation.  Patient was given IV morphine IV Zofran IV normal saline.  Still having pain.  States her pain is not controlled and she does not feel comfortable going home.  Discussed the case with Dr. Boateng given the  facial swelling and there is consideration that may be there is component of SLE exacerbation within the abdomen in the context of this associated facial swelling so the patient was given IV Solu-Medrol.  Discussed again with patient and mom they do not feel comfortable going home at this time.  Discussed with the hospitalist who accepted as the patient would benefit from hospitalization for symptom control and continued observation.  This visit was staffed with the attending physician Dr. Stout.      Disclaimer: This note was dictated using speech recognition software. An attempt at proofreading was made to minimize errors. Minor errors in transcription may be present. Please call if questions.    Amount and/or Complexity of Data Reviewed  External Data Reviewed: labs, radiology and notes.  Labs: ordered.  Radiology: ordered.        Procedure  Procedures     Jefferson Thomas PA-C  03/01/24 8464

## 2024-03-02 VITALS
HEIGHT: 69 IN | WEIGHT: 160 LBS | SYSTOLIC BLOOD PRESSURE: 105 MMHG | BODY MASS INDEX: 23.7 KG/M2 | TEMPERATURE: 97.9 F | HEART RATE: 67 BPM | RESPIRATION RATE: 18 BRPM | OXYGEN SATURATION: 97 % | DIASTOLIC BLOOD PRESSURE: 56 MMHG

## 2024-03-02 PROBLEM — R11.0 NAUSEA: Status: RESOLVED | Noted: 2024-03-01 | Resolved: 2024-03-02

## 2024-03-02 PROBLEM — R10.9 ABDOMINAL PAIN: Status: RESOLVED | Noted: 2024-03-01 | Resolved: 2024-03-02

## 2024-03-02 LAB
ALBUMIN SERPL BCP-MCNC: 4.3 G/DL (ref 3.4–5)
ALP SERPL-CCNC: 75 U/L (ref 33–110)
ALT SERPL W P-5'-P-CCNC: 17 U/L (ref 7–45)
ANION GAP SERPL CALC-SCNC: 11 MMOL/L (ref 10–20)
AST SERPL W P-5'-P-CCNC: 17 U/L (ref 9–39)
BILIRUB SERPL-MCNC: 0.4 MG/DL (ref 0–1.2)
BUN SERPL-MCNC: 11 MG/DL (ref 6–23)
CALCIUM SERPL-MCNC: 9.4 MG/DL (ref 8.6–10.3)
CHLORIDE SERPL-SCNC: 102 MMOL/L (ref 98–107)
CO2 SERPL-SCNC: 27 MMOL/L (ref 21–32)
CREAT SERPL-MCNC: 0.69 MG/DL (ref 0.5–1.05)
EGFRCR SERPLBLD CKD-EPI 2021: >90 ML/MIN/1.73M*2
ERYTHROCYTE [DISTWIDTH] IN BLOOD BY AUTOMATED COUNT: 13.4 % (ref 11.5–14.5)
GLUCOSE SERPL-MCNC: 136 MG/DL (ref 74–99)
HCT VFR BLD AUTO: 42.4 % (ref 36–46)
HGB BLD-MCNC: 14 G/DL (ref 12–16)
MCH RBC QN AUTO: 27.8 PG (ref 26–34)
MCHC RBC AUTO-ENTMCNC: 33 G/DL (ref 32–36)
MCV RBC AUTO: 84 FL (ref 80–100)
NRBC BLD-RTO: 0 /100 WBCS (ref 0–0)
PLATELET # BLD AUTO: 268 X10*3/UL (ref 150–450)
POTASSIUM SERPL-SCNC: 4.2 MMOL/L (ref 3.5–5.3)
PROT SERPL-MCNC: 7.2 G/DL (ref 6.4–8.2)
RBC # BLD AUTO: 5.04 X10*6/UL (ref 4–5.2)
SODIUM SERPL-SCNC: 136 MMOL/L (ref 136–145)
WBC # BLD AUTO: 9.1 X10*3/UL (ref 4.4–11.3)

## 2024-03-02 PROCEDURE — 2500000004 HC RX 250 GENERAL PHARMACY W/ HCPCS (ALT 636 FOR OP/ED): Performed by: STUDENT IN AN ORGANIZED HEALTH CARE EDUCATION/TRAINING PROGRAM

## 2024-03-02 PROCEDURE — 99238 HOSP IP/OBS DSCHRG MGMT 30/<: CPT | Performed by: FAMILY MEDICINE

## 2024-03-02 PROCEDURE — 2500000005 HC RX 250 GENERAL PHARMACY W/O HCPCS: Performed by: STUDENT IN AN ORGANIZED HEALTH CARE EDUCATION/TRAINING PROGRAM

## 2024-03-02 PROCEDURE — 36415 COLL VENOUS BLD VENIPUNCTURE: CPT | Performed by: STUDENT IN AN ORGANIZED HEALTH CARE EDUCATION/TRAINING PROGRAM

## 2024-03-02 PROCEDURE — 96376 TX/PRO/DX INJ SAME DRUG ADON: CPT

## 2024-03-02 PROCEDURE — G0378 HOSPITAL OBSERVATION PER HR: HCPCS

## 2024-03-02 PROCEDURE — 85027 COMPLETE CBC AUTOMATED: CPT | Performed by: STUDENT IN AN ORGANIZED HEALTH CARE EDUCATION/TRAINING PROGRAM

## 2024-03-02 PROCEDURE — 80053 COMPREHEN METABOLIC PANEL: CPT | Performed by: STUDENT IN AN ORGANIZED HEALTH CARE EDUCATION/TRAINING PROGRAM

## 2024-03-02 RX ORDER — HYDROCODONE BITARTRATE AND ACETAMINOPHEN 5; 325 MG/1; MG/1
1 TABLET ORAL EVERY 6 HOURS PRN
Qty: 10 TABLET | Refills: 0 | Status: ON HOLD | OUTPATIENT
Start: 2024-03-02 | End: 2024-04-05 | Stop reason: SDUPTHER

## 2024-03-02 RX ORDER — ONDANSETRON 4 MG/1
4 TABLET, ORALLY DISINTEGRATING ORAL EVERY 8 HOURS PRN
Qty: 20 TABLET | Refills: 0 | Status: SHIPPED | OUTPATIENT
Start: 2024-03-02

## 2024-03-02 RX ORDER — HYDROCODONE BITARTRATE AND ACETAMINOPHEN 5; 325 MG/1; MG/1
1 TABLET ORAL EVERY 4 HOURS PRN
Status: DISCONTINUED | OUTPATIENT
Start: 2024-03-02 | End: 2024-03-02 | Stop reason: HOSPADM

## 2024-03-02 RX ORDER — PREDNISONE 10 MG/1
TABLET ORAL
Qty: 44 TABLET | Refills: 0 | Status: SHIPPED | OUTPATIENT
Start: 2024-03-02 | End: 2024-03-21

## 2024-03-02 RX ADMIN — MORPHINE SULFATE 4 MG: 4 INJECTION INTRAVENOUS at 08:13

## 2024-03-02 RX ADMIN — ONDANSETRON 4 MG: 4 TABLET, ORALLY DISINTEGRATING ORAL at 09:08

## 2024-03-02 RX ADMIN — MORPHINE SULFATE 2 MG: 2 INJECTION, SOLUTION INTRAMUSCULAR; INTRAVENOUS at 04:07

## 2024-03-02 RX ADMIN — PREDNISONE 40 MG: 20 TABLET ORAL at 08:13

## 2024-03-02 RX ADMIN — POLYETHYLENE GLYCOL 3350 17 G: 17 POWDER, FOR SOLUTION ORAL at 08:13

## 2024-03-02 ASSESSMENT — COGNITIVE AND FUNCTIONAL STATUS - GENERAL
MOBILITY SCORE: 24
DAILY ACTIVITIY SCORE: 24

## 2024-03-02 ASSESSMENT — PAIN SCALES - GENERAL
PAINLEVEL_OUTOF10: 7
PAINLEVEL_OUTOF10: 3
PAINLEVEL_OUTOF10: 6
PAINLEVEL_OUTOF10: 7

## 2024-03-02 ASSESSMENT — ACTIVITIES OF DAILY LIVING (ADL): LACK_OF_TRANSPORTATION: NO

## 2024-03-02 ASSESSMENT — PAIN DESCRIPTION - LOCATION
LOCATION: ABDOMEN
LOCATION: ABDOMEN

## 2024-03-02 ASSESSMENT — PAIN - FUNCTIONAL ASSESSMENT
PAIN_FUNCTIONAL_ASSESSMENT: 0-10

## 2024-03-02 ASSESSMENT — PAIN DESCRIPTION - ORIENTATION: ORIENTATION: RIGHT

## 2024-03-02 NOTE — PROGRESS NOTES
03/02/2024 1138am  Made aware patient to discharge home today with no home going needs. Patient arranging transportation home with family. Patient cleared to discharge home from Transition's standpoint.

## 2024-03-02 NOTE — DISCHARGE SUMMARY
Discharge Diagnosis  Abdominal pain, suspected lupus flare      Discharge Meds     Your medication list        START taking these medications        Instructions Last Dose Given Next Dose Due   predniSONE 10 mg tablet  Commonly known as: Deltasone  Start taking on: March 2, 2024      Take 4 tablets (40 mg) by mouth once daily for 6 days, THEN 2 tablets (20 mg) once daily for 7 days, THEN 1 tablet (10 mg) once daily for 6 days.              CONTINUE taking these medications        Instructions Last Dose Given Next Dose Due   ascorbic acid (vitamin C) 500 mg capsule           B complex-vitamin C-folic acid 1- mg-mg-mcg tablet  Commonly known as: Nephro-Harley Rx           cholecalciferol 25 MCG (1000 UT) capsule  Commonly known as: Vitamin D-3           cloNIDine ER 0.1 mg tablet extended release 12 hr  Commonly known as: Kapvay      Take 2 tablets (0.2 mg) by mouth once daily at bedtime.       FLUoxetine 40 mg capsule  Commonly known as: PROzac      TAKE 1 CAPSULE BY MOUTH ONCE DAILY       folic acid 1 mg tablet  Commonly known as: Folvite      Take 1 tablet (1 mg) by mouth once daily. 6 DAYS PER WEEK. NOT TO TAKE ON DAY when takes methotrexate       HYDROcodone-acetaminophen 5-325 mg tablet  Commonly known as: Norco      Take 1 tablet by mouth every 6 hours if needed for severe pain (7 - 10).       methotrexate 2.5 mg tablet  Commonly known as: Trexall      Take 4 tablets (10 mg total) by mouth 1 (one) time per week.       mirtazapine 7.5 mg tablet  Commonly known as: Remeron      TAKE 1 TABLET (7.5 MG) BY MOUTH ONCE DAILY AT BEDTIME.       MULTIVITAMIN-FERROUS FUMARATE-FOLIC ACID 9 MG-200 MCG TABLET (HALF TABLET)  Commonly known as: Centrum           ondansetron ODT 4 mg disintegrating tablet  Commonly known as: Zofran-ODT      Take 1 tablet (4 mg) by mouth every 8 hours if needed for nausea or vomiting.       rizatriptan 10 mg tablet  Commonly known as: Maxalt                  STOP taking these medications       cephalexin 500 mg capsule  Commonly known as: Keflex                  Where to Get Your Medications        These medications were sent to [facility]/pharmacy #[id number] - [place], [place] - [address]  [address] [place] [address]      Phone: [phone]   HYDROcodone-acetaminophen 5-325 mg tablet  ondansetron ODT 4 mg disintegrating tablet  predniSONE 10 mg tablet         Test Results Pending At Discharge  Pending Labs       Order Current Status    Urine Culture In process            Hospital Course   Enrique Kelly is a 24 year old female with past medical history of cutaneous lupus and tic disorder who presents with intractable abdominal pain.   She presented to the emergency room several days before this admission due to the same pain.  She at that time she was diagnosed with a urinary tract infection discharged with Keflex however the pain and nausea remained despite antibiotic therapy.  She presented back due to difficulty maintaining oral intake.  Family and friends noted facial swelling and a mylar rash.  The case was further discussed with the patient's rheumatologist who advised for a prednisone taper.  In the emergency room they were concerned the patient would not be able to tolerate oral intake and the patient was placed on knobs overnight.  With steroids and pain control the patient's appetite and pain improved.  Will discharge with a steroid taper as directed by her rheumatologist along with Zofran and a short course of Vicodin    Pertinent Physical Exam At Time of Discharge  Physical Exam  Gen: lying comfortably in bed, not in acute distress  HEENT: atraumatic, normocephalic  Pulm: normal respiratory effort, clear to auscultation b/l  Cardiac: RRR, no murmurs noted, normal S1/S2  GI: Soft, nontender, BS+  MSK: normal ROM without joint swelling  Extremities: no LE edema, cyanosis  Neuro: AOX3, CN II-XII grossly intact, equal b/l strength, no loss in sensation   Psych:  calm and appropriate for situation     Outpatient Follow-Up  Future Appointments   Date Time Provider Department Center   3/4/2024 11:30 AM Becky Rubi MD [id number]         Piter Padilla DO

## 2024-03-02 NOTE — PROGRESS NOTES
03/02/24 0914   Discharge Planning   Living Arrangements Spouse/significant other   Support Systems Spouse/significant other   Assistance Needed X3, independent in ADLs, ambulates without assistive devices, drives, No O2, CPAP or Bipap at baseline   Type of Residence Private residence   Who is requesting discharge planning? Provider   Home or Post Acute Services None   Patient expects to be discharged to: Home, No needs-denied need for HHC   Does the patient need discharge transport arranged? No   Financial Resource Strain   How hard is it for you to pay for the very basics like food, housing, medical care, and heating? Not very   Housing Stability   In the last 12 months, was there a time when you were not able to pay the mortgage or rent on time? N   In the last 12 months, how many places have you lived? 1   In the last 12 months, was there a time when you did not have a steady place to sleep or slept in a shelter (including now)? N   Transportation Needs   In the past 12 months, has lack of transportation kept you from medical appointments or from getting medications? no   In the past 12 months, has lack of transportation kept you from meetings, work, or from getting things needed for daily living? No

## 2024-03-03 LAB — BACTERIA UR CULT: NO GROWTH

## 2024-03-04 ENCOUNTER — APPOINTMENT (OUTPATIENT)
Dept: BEHAVIORAL HEALTH | Facility: CLINIC | Age: 24
End: 2024-03-04
Payer: COMMERCIAL

## 2024-03-04 ENCOUNTER — PATIENT OUTREACH (OUTPATIENT)
Dept: CARE COORDINATION | Facility: CLINIC | Age: 24
End: 2024-03-04

## 2024-03-04 NOTE — PROGRESS NOTES
[facility] member [place] Obs  outreach. Left voice mail requesting return call. Member recently seen in ED-->Obs for abdominal pain. Outreach call to follow up and see how member is doing. Is there any assistance I could provide. Assist in scheduling follow up with PCP or other providers.

## 2024-03-06 NOTE — PROGRESS NOTES
[facility] member [place]  ED-->Obs outreach.    Second attempt to reach member. Unable to leave message, mailbox full.

## 2024-03-07 NOTE — PROGRESS NOTES
Third outreach call.   [facility] member [place] ED-->Obs  outreach.    Spoke with member. I introduced myself and purpose of call.  Confirmed : Yes  No new or worsening symptoms:  Member has Rx given at discharge:  Member states facial swelling and rash have diminished. Confirms that Abx were discontinued. Notes abdominal pain is much improved. Endorses increased fluid intake. Back up to her usual amount.  Scheduled with Rheumatologist this coming Monday.  She has no questions or concerns at this time. Available if needed in future.

## 2024-03-11 ENCOUNTER — OFFICE VISIT (OUTPATIENT)
Dept: RHEUMATOLOGY | Facility: CLINIC | Age: 24
End: 2024-03-11
Payer: COMMERCIAL

## 2024-03-11 VITALS
BODY MASS INDEX: 24.79 KG/M2 | HEART RATE: 81 BPM | DIASTOLIC BLOOD PRESSURE: 84 MMHG | HEIGHT: 69 IN | WEIGHT: 167.4 LBS | SYSTOLIC BLOOD PRESSURE: 138 MMHG

## 2024-03-11 DIAGNOSIS — L93.2 CUTANEOUS LUPUS ERYTHEMATOSUS: ICD-10-CM

## 2024-03-11 DIAGNOSIS — R22.0 FACIAL SWELLING: Primary | ICD-10-CM

## 2024-03-11 DIAGNOSIS — R10.31 RIGHT LOWER QUADRANT PAIN: ICD-10-CM

## 2024-03-11 PROCEDURE — 1036F TOBACCO NON-USER: CPT | Performed by: INTERNAL MEDICINE

## 2024-03-11 PROCEDURE — 99214 OFFICE O/P EST MOD 30 MIN: CPT | Performed by: INTERNAL MEDICINE

## 2024-03-11 RX ORDER — METHOTREXATE 2.5 MG/1
15 TABLET ORAL
Qty: 72 TABLET | Refills: 0 | Status: SHIPPED | OUTPATIENT
Start: 2024-03-11 | End: 2024-04-05 | Stop reason: HOSPADM

## 2024-03-11 NOTE — PROGRESS NOTES
Subjective   Patient ID: Allyssa Kelly is a 24 y.o. female who presents for Follow-up ([facility]).  HPI  Subjective   Patient ID: Allyssa Kelly is a 23 y.o. female who presents for Joint Pain (Follow up ).  HPI  Patient with history of cutaneous lupus with negative ISRRAEL/BROOKS panel.  Previously had discontinued hydroxychloroquine due to GI symptoms.  Shortly after she discontinued hydroxychloroquine had an episode of angioedema in which her eyes and lip had swollen in October.    Patient is here with her mother.  Today is an acute visit.  We had discussed about different medications for her cutaneous lupus including CellCept and methotrexate and she was interested in methotrexate.    I was contacted by the emergency room March 1 that she had come in with abdominal pain.  She had come in a few days previously With abdominal pain and treated for UTI sent home.  She was not having UTI symptoms at the time.  She also has not had a period since December.  She has a copper IUD.  She did have an ultrasound during her first emergency room visit that had noticed some free fluid.  She had blood work and CT scan that was all benign.  They are wondering if she could be having symptoms secondary to lupus and I told them that I did not feel that she had systemic lupus but just cutaneous lupus.  I thought it be unusual for her to have abdominal pain secondary to this.  They are uncertain if angioedema may be causing any issues with her gut.  There was no evidence of swelling on her CT scan.  I told them to give her an injection of steroid and to discharge her but she was admitted instead.  Discharged the next day.  She is still continuing to taper off steroids.  She was having increase in blood pressure with her pain.    When she got discharged her blood pressure was better as well as her pain level.  Of note when they had given her morphine a lot of her veins had turned red.  When the morphine was diluted she did not  have this issue.  Only lasted for a few minutes.  When she went home she had been picking at some bumps on her chest area but then was covered by small dots that took about few hours to dissipate.    Physical Exam  Physical Exam  GEN: NAD A&O x3 appropriate affect  SKIN: No current swelling in her lips or face.      Review of Systems   Skin:         No current rashes       Assessment/Plan   I do not feel that she has systemic lupus.  Has negative ISRRAEL/BROOKS panel.  Has had history of cutaneous lupus and had side effects with hydroxychloroquine with increase in bloating.    Has only had 1 episode of angioedema in which she had eye and lip swelling.    Her mother adds that she has had facial fullness/swelling but not quite angioedema for years.  This seems that when she feels like a flare of her skin lupus is occurring that she gets more general swelling.    Speculation that she could have had a ruptured ovarian cyst that could have given her pain since there was free fluid seen on her ultrasound    Will like her to quickly taper off her prednisone.  She is currently on 20 mg and I told her to do 10 mg for 2 days and then 5 mg for 2 days and off.    Uncertain of methotrexate to see.  Currently on 10 mg.  Started in December.  We can increase it to 15 mg as she was still having some skin rash.  Her right lower abdominal pain has resolved.      Reviewed her blood work.  Will have her come back again in 4 months

## 2024-03-14 ENCOUNTER — HOSPITAL ENCOUNTER (EMERGENCY)
Facility: HOSPITAL | Age: 24
Discharge: HOME | End: 2024-03-14
Attending: EMERGENCY MEDICINE
Payer: COMMERCIAL

## 2024-03-14 ENCOUNTER — APPOINTMENT (OUTPATIENT)
Dept: RADIOLOGY | Facility: HOSPITAL | Age: 24
End: 2024-03-14
Payer: COMMERCIAL

## 2024-03-14 VITALS
TEMPERATURE: 97.9 F | DIASTOLIC BLOOD PRESSURE: 66 MMHG | OXYGEN SATURATION: 98 % | HEART RATE: 83 BPM | WEIGHT: 170 LBS | SYSTOLIC BLOOD PRESSURE: 112 MMHG | BODY MASS INDEX: 25.18 KG/M2 | RESPIRATION RATE: 16 BRPM | HEIGHT: 69 IN

## 2024-03-14 DIAGNOSIS — R10.84 GENERALIZED ABDOMINAL PAIN: Primary | ICD-10-CM

## 2024-03-14 LAB
ALBUMIN SERPL BCP-MCNC: 4.2 G/DL (ref 3.4–5)
ALP SERPL-CCNC: 78 U/L (ref 33–110)
ALT SERPL W P-5'-P-CCNC: 16 U/L (ref 7–45)
ANION GAP SERPL CALC-SCNC: 11 MMOL/L (ref 10–20)
APPEARANCE UR: CLEAR
AST SERPL W P-5'-P-CCNC: 16 U/L (ref 9–39)
BASOPHILS # BLD AUTO: 0.04 X10*3/UL (ref 0–0.1)
BASOPHILS NFR BLD AUTO: 0.7 %
BILIRUB SERPL-MCNC: 0.3 MG/DL (ref 0–1.2)
BILIRUB UR STRIP.AUTO-MCNC: NEGATIVE MG/DL
BUN SERPL-MCNC: 13 MG/DL (ref 6–23)
CALCIUM SERPL-MCNC: 9 MG/DL (ref 8.6–10.3)
CHLORIDE SERPL-SCNC: 101 MMOL/L (ref 98–107)
CO2 SERPL-SCNC: 27 MMOL/L (ref 21–32)
COLOR UR: ABNORMAL
CREAT SERPL-MCNC: 0.72 MG/DL (ref 0.5–1.05)
CRP SERPL-MCNC: 0.12 MG/DL
EGFRCR SERPLBLD CKD-EPI 2021: >90 ML/MIN/1.73M*2
EOSINOPHIL # BLD AUTO: 0.17 X10*3/UL (ref 0–0.7)
EOSINOPHIL NFR BLD AUTO: 3 %
ERYTHROCYTE [DISTWIDTH] IN BLOOD BY AUTOMATED COUNT: 13.8 % (ref 11.5–14.5)
ERYTHROCYTE [SEDIMENTATION RATE] IN BLOOD BY WESTERGREN METHOD: 8 MM/H (ref 0–20)
GLUCOSE SERPL-MCNC: 83 MG/DL (ref 74–99)
GLUCOSE UR STRIP.AUTO-MCNC: NEGATIVE MG/DL
HCG UR QL IA.RAPID: NEGATIVE
HCT VFR BLD AUTO: 41.1 % (ref 36–46)
HGB BLD-MCNC: 13.5 G/DL (ref 12–16)
IMM GRANULOCYTES # BLD AUTO: 0.01 X10*3/UL (ref 0–0.7)
IMM GRANULOCYTES NFR BLD AUTO: 0.2 % (ref 0–0.9)
KETONES UR STRIP.AUTO-MCNC: NEGATIVE MG/DL
LEUKOCYTE ESTERASE UR QL STRIP.AUTO: NEGATIVE
LIPASE SERPL-CCNC: 25 U/L (ref 9–82)
LYMPHOCYTES # BLD AUTO: 2.11 X10*3/UL (ref 1.2–4.8)
LYMPHOCYTES NFR BLD AUTO: 37.7 %
MAGNESIUM SERPL-MCNC: 2.08 MG/DL (ref 1.6–2.4)
MCH RBC QN AUTO: 27.8 PG (ref 26–34)
MCHC RBC AUTO-ENTMCNC: 32.8 G/DL (ref 32–36)
MCV RBC AUTO: 85 FL (ref 80–100)
MONOCYTES # BLD AUTO: 0.5 X10*3/UL (ref 0.1–1)
MONOCYTES NFR BLD AUTO: 8.9 %
NEUTROPHILS # BLD AUTO: 2.77 X10*3/UL (ref 1.2–7.7)
NEUTROPHILS NFR BLD AUTO: 49.5 %
NITRITE UR QL STRIP.AUTO: NEGATIVE
NRBC BLD-RTO: 0 /100 WBCS (ref 0–0)
PH UR STRIP.AUTO: 6 [PH]
PLATELET # BLD AUTO: 217 X10*3/UL (ref 150–450)
POTASSIUM SERPL-SCNC: 4 MMOL/L (ref 3.5–5.3)
PROT SERPL-MCNC: 6.7 G/DL (ref 6.4–8.2)
PROT UR STRIP.AUTO-MCNC: NEGATIVE MG/DL
RBC # BLD AUTO: 4.86 X10*6/UL (ref 4–5.2)
RBC # UR STRIP.AUTO: NEGATIVE /UL
SODIUM SERPL-SCNC: 135 MMOL/L (ref 136–145)
SP GR UR STRIP.AUTO: 1
UROBILINOGEN UR STRIP.AUTO-MCNC: <2 MG/DL
WBC # BLD AUTO: 5.6 X10*3/UL (ref 4.4–11.3)

## 2024-03-14 PROCEDURE — 99284 EMERGENCY DEPT VISIT MOD MDM: CPT | Mod: 25

## 2024-03-14 PROCEDURE — 2500000004 HC RX 250 GENERAL PHARMACY W/ HCPCS (ALT 636 FOR OP/ED): Performed by: EMERGENCY MEDICINE

## 2024-03-14 PROCEDURE — 85652 RBC SED RATE AUTOMATED: CPT | Performed by: EMERGENCY MEDICINE

## 2024-03-14 PROCEDURE — 76856 US EXAM PELVIC COMPLETE: CPT

## 2024-03-14 PROCEDURE — 83735 ASSAY OF MAGNESIUM: CPT | Performed by: EMERGENCY MEDICINE

## 2024-03-14 PROCEDURE — 81003 URINALYSIS AUTO W/O SCOPE: CPT | Performed by: EMERGENCY MEDICINE

## 2024-03-14 PROCEDURE — 96375 TX/PRO/DX INJ NEW DRUG ADDON: CPT

## 2024-03-14 PROCEDURE — 80053 COMPREHEN METABOLIC PANEL: CPT | Performed by: EMERGENCY MEDICINE

## 2024-03-14 PROCEDURE — 86140 C-REACTIVE PROTEIN: CPT | Performed by: EMERGENCY MEDICINE

## 2024-03-14 PROCEDURE — 76830 TRANSVAGINAL US NON-OB: CPT | Mod: FOREIGN READ | Performed by: RADIOLOGY

## 2024-03-14 PROCEDURE — 76856 US EXAM PELVIC COMPLETE: CPT | Mod: FOREIGN READ | Performed by: RADIOLOGY

## 2024-03-14 PROCEDURE — 36415 COLL VENOUS BLD VENIPUNCTURE: CPT | Performed by: EMERGENCY MEDICINE

## 2024-03-14 PROCEDURE — 2500000004 HC RX 250 GENERAL PHARMACY W/ HCPCS (ALT 636 FOR OP/ED)

## 2024-03-14 PROCEDURE — 85025 COMPLETE CBC W/AUTO DIFF WBC: CPT | Performed by: EMERGENCY MEDICINE

## 2024-03-14 PROCEDURE — 96374 THER/PROPH/DIAG INJ IV PUSH: CPT

## 2024-03-14 PROCEDURE — 96376 TX/PRO/DX INJ SAME DRUG ADON: CPT

## 2024-03-14 PROCEDURE — 83690 ASSAY OF LIPASE: CPT | Performed by: EMERGENCY MEDICINE

## 2024-03-14 PROCEDURE — 81025 URINE PREGNANCY TEST: CPT | Performed by: EMERGENCY MEDICINE

## 2024-03-14 PROCEDURE — 96361 HYDRATE IV INFUSION ADD-ON: CPT

## 2024-03-14 RX ORDER — HALOPERIDOL 5 MG/ML
2.5 INJECTION INTRAMUSCULAR ONCE
Status: COMPLETED | OUTPATIENT
Start: 2024-03-14 | End: 2024-03-14

## 2024-03-14 RX ORDER — MORPHINE SULFATE 4 MG/ML
4 INJECTION INTRAVENOUS ONCE
Status: COMPLETED | OUTPATIENT
Start: 2024-03-14 | End: 2024-03-14

## 2024-03-14 RX ORDER — ONDANSETRON HYDROCHLORIDE 2 MG/ML
INJECTION, SOLUTION INTRAVENOUS
Status: COMPLETED
Start: 2024-03-14 | End: 2024-03-14

## 2024-03-14 RX ORDER — ONDANSETRON HYDROCHLORIDE 2 MG/ML
4 INJECTION, SOLUTION INTRAVENOUS ONCE
Status: COMPLETED | OUTPATIENT
Start: 2024-03-14 | End: 2024-03-14

## 2024-03-14 RX ADMIN — ONDANSETRON HYDROCHLORIDE 4 MG: 2 INJECTION, SOLUTION INTRAVENOUS at 08:00

## 2024-03-14 RX ADMIN — HALOPERIDOL LACTATE 2.5 MG: 5 INJECTION, SOLUTION INTRAMUSCULAR at 12:23

## 2024-03-14 RX ADMIN — MORPHINE SULFATE 4 MG: 4 INJECTION INTRAVENOUS at 08:34

## 2024-03-14 RX ADMIN — MORPHINE SULFATE 4 MG: 4 INJECTION INTRAVENOUS at 11:34

## 2024-03-14 RX ADMIN — SODIUM CHLORIDE 1000 ML: 9 INJECTION, SOLUTION INTRAVENOUS at 08:34

## 2024-03-14 RX ADMIN — ONDANSETRON 4 MG: 2 INJECTION, SOLUTION INTRAMUSCULAR; INTRAVENOUS at 08:00

## 2024-03-14 ASSESSMENT — PAIN DESCRIPTION - PAIN TYPE
TYPE: ACUTE PAIN
TYPE: ACUTE PAIN

## 2024-03-14 ASSESSMENT — PAIN DESCRIPTION - FREQUENCY: FREQUENCY: INTERMITTENT

## 2024-03-14 ASSESSMENT — LIFESTYLE VARIABLES
HAVE PEOPLE ANNOYED YOU BY CRITICIZING YOUR DRINKING: NO
EVER FELT BAD OR GUILTY ABOUT YOUR DRINKING: NO
HAVE YOU EVER FELT YOU SHOULD CUT DOWN ON YOUR DRINKING: NO
EVER HAD A DRINK FIRST THING IN THE MORNING TO STEADY YOUR NERVES TO GET RID OF A HANGOVER: NO

## 2024-03-14 ASSESSMENT — PAIN SCALES - GENERAL
PAINLEVEL_OUTOF10: 6
PAINLEVEL_OUTOF10: 2

## 2024-03-14 ASSESSMENT — PAIN DESCRIPTION - DESCRIPTORS
DESCRIPTORS: ACHING

## 2024-03-14 ASSESSMENT — PAIN DESCRIPTION - LOCATION
LOCATION: ABDOMEN
LOCATION: ABDOMEN

## 2024-03-14 ASSESSMENT — PAIN DESCRIPTION - PROGRESSION: CLINICAL_PROGRESSION: GRADUALLY WORSENING

## 2024-03-14 ASSESSMENT — PAIN - FUNCTIONAL ASSESSMENT: PAIN_FUNCTIONAL_ASSESSMENT: 0-10

## 2024-03-14 NOTE — ED PROVIDER NOTES
Allyssa Kelly  24 y.o.    HPI  Presents to the ED with neurolysed abdominal pain.  Patient states this has been going on about a day.  It is diffuse.  Nonradiating.  No particular aggravating or alleviating factors.  Some nausea without vomiting.  No diarrhea or constipation.  No fevers.  No urinary symptoms.  Patient tried a Vicodin last night.  She is on a prednisone taper for her lupus.  She tried to take an extra dose of her prednisone last night.  No rash.  No swelling.  Was hospitalized overnight for abdominal pain about a week or so ago.  Was doing better until yesterday.  Patient has a copper IUD.  She has had a recent pelvic exam.  She has not had a period for the past 5 to 6 months.       Patient History   Past Medical History:   Diagnosis Date    Acute pharyngitis, unspecified 07/21/2014    Sore throat    Acute pharyngitis, unspecified 04/18/2017    Sore throat    Acute tonsillitis, unspecified 03/22/2021    Acute tonsillitis, unspecified    Allergy, unspecified, initial encounter 10/08/2021    Hypersensitivity    Anxiety     Arthralgia of temporomandibular joint, unspecified side 04/07/2014    Temporomandibular joint pain    Body mass index (BMI) 19.9 or less, adult 09/17/2021    Body mass index (BMI) of 19.9 or less in adult    Body mass index (BMI) pediatric, 5th percentile to less than 85th percentile for age 09/17/2021    BMI (body mass index), pediatric, 5% to less than 85% for age    Calculus of kidney with calculus of ureter 02/12/2022    Calculus of kidney with calculus of ureter    Candidiasis of skin and nail 08/31/2020    Candidal skin infection    Cellulitis of left toe 04/06/2018    Cellulitis of fifth toe of left foot    Contact with and (suspected) exposure to other viral communicable diseases 02/13/2020    Exposure to influenza    COVID-19 12/30/2020    COVID-19 virus infection    Displaced fracture of shaft of third metacarpal bone, left hand, initial encounter for closed fracture  12/05/2016    Closed displaced fracture of shaft of third metacarpal bone of left hand, initial encounter    Encounter for general adult medical examination without abnormal findings 09/17/2021    Examination, routine, over 18 years of age    Encounter for pre-employment examination 09/17/2021    Encounter for pre-employment health screening examination    Encounter for routine child health examination without abnormal findings 10/03/2018    Encounter for routine child health examination without abnormal findings    Hordeolum externum right upper eyelid 11/19/2021    Hordeolum externum of right upper eyelid    Jaw pain 10/07/2020    Chronic jaw pain    Joint derangement, unspecified 10/15/2021    Hypermobility of joint    Local infection of the skin and subcutaneous tissue, unspecified 04/26/2022    Infection of skin of finger    Localized swelling, mass and lump, head 04/28/2017    Tongue swelling    Nonscarring hair loss, unspecified 10/03/2018    Hair thinning    Other chronic diseases of tonsils and adenoids 04/28/2017    Cryptic tonsil    Other conditions influencing health status 11/18/2021    Encounter for procedure    Other diseases of tongue 04/28/2017    Tongue lesion    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    Other specified respiratory disorders 02/18/2019    Viral respiratory illness    Pain in left arm 11/22/2021    Pain of left upper extremity    Pain in right ankle and joints of right foot 09/23/2021    Right ankle pain    Pain in right wrist 06/08/2016    Right wrist pain    Pain in unspecified knee 09/30/2014    Joint pain, knee    Pain in unspecified shoulder 10/27/2014    Shoulder pain    Pain in unspecified wrist 11/22/2021    Chronic wrist pain    Panic attacks     Personal history of diseases of the skin and subcutaneous tissue 08/12/2017    History of telogen effluvium    Personal history of diseases of the skin and subcutaneous tissue 12/30/2020     History of pilonidal cyst    Personal history of diseases of the skin and subcutaneous tissue 04/06/2015    History of contact dermatitis    Personal history of other (healed) physical injury and trauma 11/19/2018    History of insect bite    Personal history of other diseases of the digestive system 02/13/2020    History of gastroenteritis    Personal history of other diseases of the female genital tract 08/12/2017    History of dysmenorrhea    Personal history of other diseases of the musculoskeletal system and connective tissue 12/14/2017    History of low back pain    Personal history of other diseases of the musculoskeletal system and connective tissue 05/10/2022    History of muscle pain    Personal history of other diseases of the respiratory system 11/19/2021    History of sore throat    Personal history of other diseases of the respiratory system 01/17/2017    History of acute sinusitis    Personal history of other diseases of the respiratory system 02/18/2019    History of sore throat    Personal history of other diseases of the respiratory system 07/21/2014    History of pharyngitis    Personal history of other diseases of the respiratory system 01/07/2015    History of influenza    Personal history of other diseases of the respiratory system 01/17/2017    History of acute sinusitis    Personal history of other diseases of urinary system 02/12/2022    History of hematuria    Personal history of other infectious and parasitic diseases 07/21/2014    History of viral infection    Personal history of other specified conditions 02/12/2022    History of dysuria    Personal history of other specified conditions 02/12/2022    History of dysuria    Personal history of other specified conditions 09/20/2021    History of insomnia    Personal history of other specified conditions 10/15/2021    History of urinary urgency    Personal history of other specified conditions 10/15/2021    History of urinary frequency     Personal history of other specified conditions 04/05/2021    History of weight loss    Personal history of other specified conditions 02/18/2019    History of fever    Personal history of other specified conditions 03/15/2021    History of nasal congestion    Personal history of other specified conditions 02/21/2020    History of diarrhea    Personal history of other specified conditions 12/01/2016    History of abdominal pain    Personal history of other specified conditions 01/07/2015    History of vomiting    Personal history of other specified conditions 01/07/2015    History of fever    PONV (postoperative nausea and vomiting)     WEARS PATCH    Premenstrual dysphoric disorder 01/25/2016    PMDD (premenstrual dysphoric disorder)    Raised antibody titer 02/24/2020    Elevated EBV antibody titer    Right lower quadrant pain 02/21/2020    Bilateral lower abdominal pain    Sprain of interphalangeal joint of right middle finger, initial encounter 03/06/2018    Sprain of interphalangeal joint of right middle finger, initial encounter    Strain of unspecified muscle(s) and tendon(s) at lower leg level, left leg, initial encounter 12/10/2018    Strain of left knee    Superficial mycosis, unspecified 10/17/2015    Fungal rash of trunk    Tic disorder, unspecified 11/01/2021    Tic disorder    TMJ (dislocation of temporomandibular joint)     Unspecified abdominal pain 12/01/2016    Abdominal pain, acute    Unspecified acute conjunctivitis, left eye 12/01/2016    Acute conjunctivitis, left eye    Unspecified conjunctivitis 11/13/2015    Conjunctivitis, left eye    Unspecified fracture of left wrist and hand, initial encounter for closed fracture 12/29/2016    Fracture of left hand, closed, initial encounter    Unspecified injury of left wrist, hand and finger(s), initial encounter 12/03/2016    Injury of left hand, initial encounter    Unspecified injury of right lower leg, initial encounter 12/07/2015    Right knee  "injury    Unspecified injury of right wrist, hand and finger(s), initial encounter 03/28/2017    Injury of right hand, initial encounter    Unspecified injury of right wrist, hand and finger(s), initial encounter 03/06/2018    Injury of finger of right hand, initial encounter    Unspecified injury of unspecified foot, initial encounter 05/02/2015    Foot injury    Unspecified injury of unspecified wrist, hand and finger(s), initial encounter     Finger injury    Unspecified renal colic 02/12/2022    Ureteral colic    Vomiting, unspecified 02/25/2020    Intermittent vomiting     Past Surgical History:   Procedure Laterality Date    OTHER SURGICAL HISTORY  12/11/2020    Foot surgery    OTHER SURGICAL HISTORY  10/19/2020    Wrist surgery    OTHER SURGICAL HISTORY  03/25/2022    Temporomandibular joint surgery    OTHER SURGICAL HISTORY  09/16/2020    Surgery    OTHER SURGICAL HISTORY  05/07/2021    Arthrocentesis (Therapeutic)     Family History   Problem Relation Name Age of Onset    Anxiety disorder Mother      Hodgkin's lymphoma Mother      Breast cancer Mother      Hypertension Father      Other (PATENT DUCTUS ARTERIOSUS) Father      Other (TIC DISORDER) Father      Lupus Father's Sister      Anxiety disorder Father's Brother      Anxiety disorder Maternal Grandmother      Anxiety disorder Maternal Grandfather      Lupus Paternal Grandmother      Hypertension Paternal Grandfather       Social History     Tobacco Use    Smoking status: Never    Smokeless tobacco: Never   Vaping Use    Vaping Use: Never used   Substance Use Topics    Alcohol use: Yes     Comment: social    Drug use: Never       Physical Exam   Vitals:    03/14/24 0738   BP: (!) 143/107   Pulse: 92   Resp: 18   Temp: 36.6 °C (97.9 °F)   TempSrc: Temporal   SpO2: 99%   Weight: 77.1 kg (170 lb)   Height: 1.753 m (5' 9\")        Constitutional: NAD, non-toxic  Eyes: PERRL, Conjunctiva normal, No discharge  HEENT: Atraumatic. External ears appear normal, " Nose is without drainage, MMM, no intraoral lesions  Neck: Normal ROM, No lymphadenopathy, No stridor  Cardiac: Regular rhythm and rate  Pulmonary/Chest: No respiratory distress, Normal breath sounds, No chest tenderness  Abdomen: BS present, Soft, Reports diffuse TTP; exam is non-surgical in nature though  Back: No tenderness, No contusions  Extremities: Normal ROM, No edema. No tenderness, intact distal pulses  Skin: Warm and dry, No rashes, No erythema  Neurologic: Alert and oriented x 3, Speech clear/fluent. Normal motor function, Normal sensation, No focal neurologic deficits  Psychiatric: Normal affect, Normal judgement, Normal mood      ED Course & MDM     This is a 24-year-old female with a history of lupus who comes to the emergency department with complaint of abdominal pain that started last night.  Some nausea.  No fevers.  On exam she appears uncomfortable.  Abdomen is soft with diffuse tenderness to palpation.  Basic blood work was obtained and the patient was medicated for her symptoms.    Labs/imaging generally reassuring. Pt remedicated.  Spoke with OB/GYN who states patient can come over to the office now to get the IUD removed which is what the patient and family want.  No new concerns or issues at this time.  At this point am unclear as exact etiology the patient symptoms.  I have low suspicion for serious intra-abdominal pathology.  I do not feel that radiation imaging is indicated at this time as the patient has had other CT scans.  I discussed this with patient and mom.  I do not feel that hospitalization is necessarily indicated at this time.         Diagnoses as of 03/14/24 1252   Generalized abdominal pain       Impression   1. Generalized abdominal pain         Disposition  Discharge      MD Shefali Shi MD  03/14/24 1256

## 2024-03-14 NOTE — Clinical Note
Allyssa Robin was seen and treated in our emergency department on 3/14/2024.  She may return to work on 03/16/2024.       If you have any questions or concerns, please don't hesitate to call.      Shefali Crowell MD

## 2024-03-14 NOTE — ED TRIAGE NOTES
"Patient c/o recurrent abdominal pain for the past few weeks. Patient was seen here 2 weeks ago for the same issue and discharged home with meds. Patient states the pain is \"everywhere in her abdomen\" and she has had nausea.   "

## 2024-03-26 ENCOUNTER — OFFICE VISIT (OUTPATIENT)
Dept: PRIMARY CARE | Facility: CLINIC | Age: 24
End: 2024-03-26
Payer: COMMERCIAL

## 2024-03-26 VITALS
HEART RATE: 83 BPM | RESPIRATION RATE: 16 BRPM | SYSTOLIC BLOOD PRESSURE: 100 MMHG | BODY MASS INDEX: 25.89 KG/M2 | OXYGEN SATURATION: 100 % | HEIGHT: 69 IN | DIASTOLIC BLOOD PRESSURE: 72 MMHG | WEIGHT: 174.8 LBS

## 2024-03-26 DIAGNOSIS — B34.9 VIRAL SYNDROME: ICD-10-CM

## 2024-03-26 DIAGNOSIS — G43.109 MIGRAINE WITH AURA AND WITHOUT STATUS MIGRAINOSUS, NOT INTRACTABLE: Primary | ICD-10-CM

## 2024-03-26 PROCEDURE — 96372 THER/PROPH/DIAG INJ SC/IM: CPT | Performed by: STUDENT IN AN ORGANIZED HEALTH CARE EDUCATION/TRAINING PROGRAM

## 2024-03-26 PROCEDURE — 99214 OFFICE O/P EST MOD 30 MIN: CPT | Performed by: STUDENT IN AN ORGANIZED HEALTH CARE EDUCATION/TRAINING PROGRAM

## 2024-03-26 RX ORDER — KETOROLAC TROMETHAMINE 15 MG/ML
15 INJECTION, SOLUTION INTRAMUSCULAR; INTRAVENOUS ONCE
Status: COMPLETED | OUTPATIENT
Start: 2024-03-26 | End: 2024-03-26

## 2024-03-26 RX ORDER — KETOROLAC TROMETHAMINE 15 MG/ML
15 INJECTION, SOLUTION INTRAMUSCULAR; INTRAVENOUS ONCE
Status: DISCONTINUED | OUTPATIENT
Start: 2024-03-26 | End: 2024-03-26

## 2024-03-26 RX ADMIN — KETOROLAC TROMETHAMINE 15 MG: 15 INJECTION, SOLUTION INTRAMUSCULAR; INTRAVENOUS at 12:31

## 2024-03-26 ASSESSMENT — PATIENT HEALTH QUESTIONNAIRE - PHQ9
SUM OF ALL RESPONSES TO PHQ9 QUESTIONS 1 AND 2: 0
1. LITTLE INTEREST OR PLEASURE IN DOING THINGS: NOT AT ALL
2. FEELING DOWN, DEPRESSED OR HOPELESS: NOT AT ALL

## 2024-03-26 NOTE — PROGRESS NOTES
Subjective   Patient ID: Allyssa Kelly is a 24 y.o. female who presents for Headache (Pt is here with headache, vomiting, diarrhea, painful joints. Not sure if this is a lupus flare.).    Migraine for the past 2 days- right parietal, no radiation 8/10, stabbing and throbbing pain, sensitivity to light and sound  With nausea and vomiting yesterday and today   Diarrhea for 3 days  Abdominal pain- tightness   Fever- no, Tmax 99   Rash- malar small   COVID-   No cough or congestion, ear or teeth pain, no dizziness, no sick contact   Medications: rizatriptan, Imitrex nasal, Advil, Zofran without success, B2   Nurtec didn't help much previously           Objective   Physical Exam  Vitals reviewed.   Constitutional:       Appearance: Normal appearance.   Cardiovascular:      Rate and Rhythm: Normal rate and regular rhythm.      Heart sounds: No murmur heard.  Pulmonary:      Effort: Pulmonary effort is normal. No respiratory distress.      Breath sounds: Normal breath sounds. No wheezing.   Musculoskeletal:      Cervical back: Neck supple.      Left lower leg: No edema.   Neurological:      Mental Status: She is alert.         Assessment/Plan   Diagnoses and all orders for this visit:  Migraine with aura and without status migrainosus, not intractable  Comments:  toradol given in office advised to add 50mg benadryl when home   She may continue aborptive theapy with NSIADs  Qulipta samples given for prevention  Orders:  -     ketorolac (Toradol) injection 15 mg  Viral syndrome  Comments:  likely exacerbating migraine symptoms   continue symptomatic care  if symptoms worsen please RTC           Roselyn Man DO 03/29/24 10:54 AM

## 2024-03-26 NOTE — PATIENT INSTRUCTIONS
Thank you for coming in today!    Today we gave you 15 mg of Toradol this works best in combination with Benadryl for migraines.  When you get home please take about 25 to 50 mg of Benadryl.    I also gave some samples of Qulipta for your migraines you can take 1 tablet daily.    If symptoms not improved, we can do a steroid taper.

## 2024-03-28 ENCOUNTER — HOSPITAL ENCOUNTER (EMERGENCY)
Facility: HOSPITAL | Age: 24
Discharge: HOME | End: 2024-03-28
Attending: EMERGENCY MEDICINE
Payer: COMMERCIAL

## 2024-03-28 VITALS
HEIGHT: 69 IN | TEMPERATURE: 98.6 F | WEIGHT: 170 LBS | BODY MASS INDEX: 25.18 KG/M2 | HEART RATE: 90 BPM | OXYGEN SATURATION: 100 % | DIASTOLIC BLOOD PRESSURE: 72 MMHG | SYSTOLIC BLOOD PRESSURE: 114 MMHG | RESPIRATION RATE: 16 BRPM

## 2024-03-28 DIAGNOSIS — R51.9 ACUTE INTRACTABLE HEADACHE, UNSPECIFIED HEADACHE TYPE: Primary | ICD-10-CM

## 2024-03-28 LAB
ANION GAP SERPL CALC-SCNC: 12 MMOL/L (ref 10–20)
APPEARANCE UR: ABNORMAL
BASOPHILS # BLD AUTO: 0.02 X10*3/UL (ref 0–0.1)
BASOPHILS NFR BLD AUTO: 0.4 %
BILIRUB UR STRIP.AUTO-MCNC: NEGATIVE MG/DL
BUN SERPL-MCNC: 12 MG/DL (ref 6–23)
CALCIUM SERPL-MCNC: 8.7 MG/DL (ref 8.6–10.3)
CHLORIDE SERPL-SCNC: 105 MMOL/L (ref 98–107)
CO2 SERPL-SCNC: 23 MMOL/L (ref 21–32)
COLOR UR: YELLOW
CREAT SERPL-MCNC: 0.78 MG/DL (ref 0.5–1.05)
EGFRCR SERPLBLD CKD-EPI 2021: >90 ML/MIN/1.73M*2
EOSINOPHIL # BLD AUTO: 0.11 X10*3/UL (ref 0–0.7)
EOSINOPHIL NFR BLD AUTO: 2.4 %
ERYTHROCYTE [DISTWIDTH] IN BLOOD BY AUTOMATED COUNT: 13.6 % (ref 11.5–14.5)
FLUAV RNA RESP QL NAA+PROBE: NOT DETECTED
FLUBV RNA RESP QL NAA+PROBE: NOT DETECTED
GLUCOSE SERPL-MCNC: 85 MG/DL (ref 74–99)
GLUCOSE UR STRIP.AUTO-MCNC: NEGATIVE MG/DL
HCT VFR BLD AUTO: 40.2 % (ref 36–46)
HGB BLD-MCNC: 13.3 G/DL (ref 12–16)
HOLD SPECIMEN: NORMAL
IMM GRANULOCYTES # BLD AUTO: 0.01 X10*3/UL (ref 0–0.7)
IMM GRANULOCYTES NFR BLD AUTO: 0.2 % (ref 0–0.9)
KETONES UR STRIP.AUTO-MCNC: NEGATIVE MG/DL
LEUKOCYTE ESTERASE UR QL STRIP.AUTO: NEGATIVE
LYMPHOCYTES # BLD AUTO: 1.52 X10*3/UL (ref 1.2–4.8)
LYMPHOCYTES NFR BLD AUTO: 33 %
MAGNESIUM SERPL-MCNC: 1.95 MG/DL (ref 1.6–2.4)
MCH RBC QN AUTO: 28.1 PG (ref 26–34)
MCHC RBC AUTO-ENTMCNC: 33.1 G/DL (ref 32–36)
MCV RBC AUTO: 85 FL (ref 80–100)
MONOCYTES # BLD AUTO: 0.34 X10*3/UL (ref 0.1–1)
MONOCYTES NFR BLD AUTO: 7.4 %
NEUTROPHILS # BLD AUTO: 2.61 X10*3/UL (ref 1.2–7.7)
NEUTROPHILS NFR BLD AUTO: 56.6 %
NITRITE UR QL STRIP.AUTO: NEGATIVE
NRBC BLD-RTO: 0 /100 WBCS (ref 0–0)
PH UR STRIP.AUTO: 6 [PH]
PLATELET # BLD AUTO: 181 X10*3/UL (ref 150–450)
POTASSIUM SERPL-SCNC: 3.9 MMOL/L (ref 3.5–5.3)
PROT UR STRIP.AUTO-MCNC: NEGATIVE MG/DL
RBC # BLD AUTO: 4.73 X10*6/UL (ref 4–5.2)
RBC # UR STRIP.AUTO: NEGATIVE /UL
RSV RNA RESP QL NAA+PROBE: NOT DETECTED
SARS-COV-2 RNA RESP QL NAA+PROBE: NOT DETECTED
SODIUM SERPL-SCNC: 136 MMOL/L (ref 136–145)
SP GR UR STRIP.AUTO: 1.02
UROBILINOGEN UR STRIP.AUTO-MCNC: <2 MG/DL
WBC # BLD AUTO: 4.6 X10*3/UL (ref 4.4–11.3)

## 2024-03-28 PROCEDURE — 96375 TX/PRO/DX INJ NEW DRUG ADDON: CPT

## 2024-03-28 PROCEDURE — 2500000004 HC RX 250 GENERAL PHARMACY W/ HCPCS (ALT 636 FOR OP/ED): Performed by: EMERGENCY MEDICINE

## 2024-03-28 PROCEDURE — 99284 EMERGENCY DEPT VISIT MOD MDM: CPT | Mod: 25

## 2024-03-28 PROCEDURE — 96361 HYDRATE IV INFUSION ADD-ON: CPT

## 2024-03-28 PROCEDURE — 80048 BASIC METABOLIC PNL TOTAL CA: CPT | Performed by: EMERGENCY MEDICINE

## 2024-03-28 PROCEDURE — 87634 RSV DNA/RNA AMP PROBE: CPT | Performed by: EMERGENCY MEDICINE

## 2024-03-28 PROCEDURE — 36415 COLL VENOUS BLD VENIPUNCTURE: CPT | Performed by: EMERGENCY MEDICINE

## 2024-03-28 PROCEDURE — 83735 ASSAY OF MAGNESIUM: CPT | Performed by: EMERGENCY MEDICINE

## 2024-03-28 PROCEDURE — 96374 THER/PROPH/DIAG INJ IV PUSH: CPT

## 2024-03-28 PROCEDURE — 85025 COMPLETE CBC W/AUTO DIFF WBC: CPT | Performed by: EMERGENCY MEDICINE

## 2024-03-28 PROCEDURE — 81003 URINALYSIS AUTO W/O SCOPE: CPT | Performed by: EMERGENCY MEDICINE

## 2024-03-28 RX ORDER — METOCLOPRAMIDE HYDROCHLORIDE 5 MG/ML
10 INJECTION INTRAMUSCULAR; INTRAVENOUS ONCE
Status: COMPLETED | OUTPATIENT
Start: 2024-03-28 | End: 2024-03-28

## 2024-03-28 RX ORDER — PREDNISONE 10 MG/1
10 TABLET ORAL DAILY
Qty: 30 TABLET | Refills: 0 | Status: SHIPPED | OUTPATIENT
Start: 2024-03-28 | End: 2024-04-24 | Stop reason: ALTCHOICE

## 2024-03-28 RX ORDER — KETOROLAC TROMETHAMINE 15 MG/ML
15 INJECTION, SOLUTION INTRAMUSCULAR; INTRAVENOUS ONCE
Status: COMPLETED | OUTPATIENT
Start: 2024-03-28 | End: 2024-03-28

## 2024-03-28 RX ORDER — MORPHINE SULFATE 4 MG/ML
4 INJECTION INTRAVENOUS ONCE
Status: COMPLETED | OUTPATIENT
Start: 2024-03-28 | End: 2024-03-28

## 2024-03-28 RX ORDER — PROCHLORPERAZINE EDISYLATE 5 MG/ML
10 INJECTION INTRAMUSCULAR; INTRAVENOUS ONCE
Status: COMPLETED | OUTPATIENT
Start: 2024-03-28 | End: 2024-03-28

## 2024-03-28 RX ADMIN — SODIUM CHLORIDE 1000 ML: 9 INJECTION, SOLUTION INTRAVENOUS at 09:35

## 2024-03-28 RX ADMIN — METHYLPREDNISOLONE SODIUM SUCCINATE 125 MG: 125 INJECTION, POWDER, FOR SOLUTION INTRAMUSCULAR; INTRAVENOUS at 11:21

## 2024-03-28 RX ADMIN — SODIUM CHLORIDE 1000 ML: 9 INJECTION, SOLUTION INTRAVENOUS at 11:20

## 2024-03-28 RX ADMIN — METOCLOPRAMIDE 10 MG: 5 INJECTION, SOLUTION INTRAMUSCULAR; INTRAVENOUS at 09:35

## 2024-03-28 RX ADMIN — KETOROLAC TROMETHAMINE 15 MG: 15 INJECTION, SOLUTION INTRAMUSCULAR; INTRAVENOUS at 09:35

## 2024-03-28 RX ADMIN — MORPHINE SULFATE 4 MG: 4 INJECTION INTRAVENOUS at 11:21

## 2024-03-28 RX ADMIN — PROCHLORPERAZINE EDISYLATE 10 MG: 5 INJECTION INTRAMUSCULAR; INTRAVENOUS at 11:21

## 2024-03-28 ASSESSMENT — PAIN SCALES - GENERAL
PAINLEVEL_OUTOF10: 10 - WORST POSSIBLE PAIN
PAINLEVEL_OUTOF10: 5 - MODERATE PAIN
PAINLEVEL_OUTOF10: 10 - WORST POSSIBLE PAIN
PAINLEVEL_OUTOF10: 5 - MODERATE PAIN

## 2024-03-28 ASSESSMENT — PAIN - FUNCTIONAL ASSESSMENT
PAIN_FUNCTIONAL_ASSESSMENT: 0-10

## 2024-03-28 ASSESSMENT — PAIN DESCRIPTION - LOCATION
LOCATION: HEAD

## 2024-03-28 ASSESSMENT — LIFESTYLE VARIABLES
EVER FELT BAD OR GUILTY ABOUT YOUR DRINKING: NO
HAVE YOU EVER FELT YOU SHOULD CUT DOWN ON YOUR DRINKING: NO
TOTAL SCORE: 0
EVER HAD A DRINK FIRST THING IN THE MORNING TO STEADY YOUR NERVES TO GET RID OF A HANGOVER: NO
HAVE PEOPLE ANNOYED YOU BY CRITICIZING YOUR DRINKING: NO

## 2024-03-28 ASSESSMENT — PAIN DESCRIPTION - PAIN TYPE
TYPE: ACUTE PAIN

## 2024-03-28 ASSESSMENT — PAIN DESCRIPTION - FREQUENCY: FREQUENCY: CONSTANT/CONTINUOUS

## 2024-03-28 ASSESSMENT — PAIN DESCRIPTION - DESCRIPTORS: DESCRIPTORS: ACHING

## 2024-03-28 NOTE — ED TRIAGE NOTES
Pt arrived with 10/10 headache x's 4 days. Pt has had recent birth control changes possibly causing hormonal changes creating the migraine. Pt has been taking her prescribed medications as needed without relief. C/O nausea with dry heeves this morning. Pt also has photosensitivity.

## 2024-03-28 NOTE — ED PROVIDER NOTES
HPI   Chief Complaint   Patient presents with    Headache       Allyssa is a 24-year-old female who presents with complaint of headache.  She has a history of migraines in the past and has been treated before for migraines.  She started having headache past weekend on Sunday night with some nausea and vomiting.  She reports her joints also hurt she has a history of lupus erythema.  She saw her primary care doctor few days ago was placed on new migraine medication because the old migraine medicine was not working.  She was given IM Toradol and oral Pepcid and Benadryl which she has been taking but not getting much better.  She has had a lot of nausea and vomiting.  She denies the possibility of pregnancy.  She initially had an IUD that was made for copper they removed it thinking that may be the cause.  She then had a patch for hormone treatment that was removed as well.  She denies any fever chills cough or cold symptoms.  She did have muscle aches which she sometimes gets in her joints from her lupus.  She denies any fall or head injury.  She previously had 2 recent CT scans of her abdomen for different concern.  I talked to the patient and mom at the bedside about differential.  I do not believe this is a brain bleed or brain injury.                          [place] Coma Scale Score: 15         NIH Stroke Scale: 0             Patient History   Past Medical History:   Diagnosis Date    Acute pharyngitis, unspecified 07/21/2014    Sore throat    Acute pharyngitis, unspecified 04/18/2017    Sore throat    Acute tonsillitis, unspecified 03/22/2021    Acute tonsillitis, unspecified    Allergy, unspecified, initial encounter 10/08/2021    Hypersensitivity    Anxiety     Arthralgia of temporomandibular joint, unspecified side 04/07/2014    Temporomandibular joint pain    Body mass index (BMI) 19.9 or less, adult 09/17/2021    Body mass index (BMI) of 19.9 or less in adult    Body mass index (BMI) pediatric, 5th  percentile to less than 85th percentile for age 09/17/2021    BMI (body mass index), pediatric, 5% to less than 85% for age    Calculus of kidney with calculus of ureter 02/12/2022    Calculus of kidney with calculus of ureter    Candidiasis of skin and nail 08/31/2020    Candidal skin infection    Cellulitis of left toe 04/06/2018    Cellulitis of fifth toe of left foot    Contact with and (suspected) exposure to other viral communicable diseases 02/13/2020    Exposure to influenza    COVID-19 12/30/2020    COVID-19 virus infection    Displaced fracture of shaft of third metacarpal bone, left hand, initial encounter for closed fracture 12/05/2016    Closed displaced fracture of shaft of third metacarpal bone of left hand, initial encounter    Encounter for general adult medical examination without abnormal findings 09/17/2021    Examination, routine, over 18 years of age    Encounter for pre-employment examination 09/17/2021    Encounter for pre-employment health screening examination    Encounter for routine child health examination without abnormal findings 10/03/2018    Encounter for routine child health examination without abnormal findings    Hordeolum externum right upper eyelid 11/19/2021    Hordeolum externum of right upper eyelid    Jaw pain 10/07/2020    Chronic jaw pain    Joint derangement, unspecified 10/15/2021    Hypermobility of joint    Local infection of the skin and subcutaneous tissue, unspecified 04/26/2022    Infection of skin of finger    Localized swelling, mass and lump, head 04/28/2017    Tongue swelling    Nonscarring hair loss, unspecified 10/03/2018    Hair thinning    Other chronic diseases of tonsils and adenoids 04/28/2017    Cryptic tonsil    Other conditions influencing health status 11/18/2021    Encounter for procedure    Other diseases of tongue 04/28/2017    Tongue lesion    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    Other specified  respiratory disorders 02/18/2019    Viral respiratory illness    Pain in left arm 11/22/2021    Pain of left upper extremity    Pain in right ankle and joints of right foot 09/23/2021    Right ankle pain    Pain in right wrist 06/08/2016    Right wrist pain    Pain in unspecified knee 09/30/2014    Joint pain, knee    Pain in unspecified shoulder 10/27/2014    Shoulder pain    Pain in unspecified wrist 11/22/2021    Chronic wrist pain    Panic attacks     Personal history of diseases of the skin and subcutaneous tissue 08/12/2017    History of telogen effluvium    Personal history of diseases of the skin and subcutaneous tissue 12/30/2020    History of pilonidal cyst    Personal history of diseases of the skin and subcutaneous tissue 04/06/2015    History of contact dermatitis    Personal history of other (healed) physical injury and trauma 11/19/2018    History of insect bite    Personal history of other diseases of the digestive system 02/13/2020    History of gastroenteritis    Personal history of other diseases of the female genital tract 08/12/2017    History of dysmenorrhea    Personal history of other diseases of the musculoskeletal system and connective tissue 12/14/2017    History of low back pain    Personal history of other diseases of the musculoskeletal system and connective tissue 05/10/2022    History of muscle pain    Personal history of other diseases of the respiratory system 11/19/2021    History of sore throat    Personal history of other diseases of the respiratory system 01/17/2017    History of acute sinusitis    Personal history of other diseases of the respiratory system 02/18/2019    History of sore throat    Personal history of other diseases of the respiratory system 07/21/2014    History of pharyngitis    Personal history of other diseases of the respiratory system 01/07/2015    History of influenza    Personal history of other diseases of the respiratory system 01/17/2017    History of  acute sinusitis    Personal history of other diseases of urinary system 02/12/2022    History of hematuria    Personal history of other infectious and parasitic diseases 07/21/2014    History of viral infection    Personal history of other specified conditions 02/12/2022    History of dysuria    Personal history of other specified conditions 02/12/2022    History of dysuria    Personal history of other specified conditions 09/20/2021    History of insomnia    Personal history of other specified conditions 10/15/2021    History of urinary urgency    Personal history of other specified conditions 10/15/2021    History of urinary frequency    Personal history of other specified conditions 04/05/2021    History of weight loss    Personal history of other specified conditions 02/18/2019    History of fever    Personal history of other specified conditions 03/15/2021    History of nasal congestion    Personal history of other specified conditions 02/21/2020    History of diarrhea    Personal history of other specified conditions 12/01/2016    History of abdominal pain    Personal history of other specified conditions 01/07/2015    History of vomiting    Personal history of other specified conditions 01/07/2015    History of fever    PONV (postoperative nausea and vomiting)     WEARS PATCH    Premenstrual dysphoric disorder 01/25/2016    PMDD (premenstrual dysphoric disorder)    Raised antibody titer 02/24/2020    Elevated EBV antibody titer    Right lower quadrant pain 02/21/2020    Bilateral lower abdominal pain    Sprain of interphalangeal joint of right middle finger, initial encounter 03/06/2018    Sprain of interphalangeal joint of right middle finger, initial encounter    Strain of unspecified muscle(s) and tendon(s) at lower leg level, left leg, initial encounter 12/10/2018    Strain of left knee    Superficial mycosis, unspecified 10/17/2015    Fungal rash of trunk    Tic disorder, unspecified 11/01/2021    Tic  disorder    TMJ (dislocation of temporomandibular joint)     Unspecified abdominal pain 12/01/2016    Abdominal pain, acute    Unspecified acute conjunctivitis, left eye 12/01/2016    Acute conjunctivitis, left eye    Unspecified conjunctivitis 11/13/2015    Conjunctivitis, left eye    Unspecified fracture of left wrist and hand, initial encounter for closed fracture 12/29/2016    Fracture of left hand, closed, initial encounter    Unspecified injury of left wrist, hand and finger(s), initial encounter 12/03/2016    Injury of left hand, initial encounter    Unspecified injury of right lower leg, initial encounter 12/07/2015    Right knee injury    Unspecified injury of right wrist, hand and finger(s), initial encounter 03/28/2017    Injury of right hand, initial encounter    Unspecified injury of right wrist, hand and finger(s), initial encounter 03/06/2018    Injury of finger of right hand, initial encounter    Unspecified injury of unspecified foot, initial encounter 05/02/2015    Foot injury    Unspecified injury of unspecified wrist, hand and finger(s), initial encounter     Finger injury    Unspecified renal colic 02/12/2022    Ureteral colic    Vomiting, unspecified 02/25/2020    Intermittent vomiting     Past Surgical History:   Procedure Laterality Date    OTHER SURGICAL HISTORY  12/11/2020    Foot surgery    OTHER SURGICAL HISTORY  10/19/2020    Wrist surgery    OTHER SURGICAL HISTORY  03/25/2022    Temporomandibular joint surgery    OTHER SURGICAL HISTORY  09/16/2020    Surgery    OTHER SURGICAL HISTORY  05/07/2021    Arthrocentesis (Therapeutic)     Family History   Problem Relation Name Age of Onset    Anxiety disorder Mother      Hodgkin's lymphoma Mother      Breast cancer Mother      Hypertension Father      Other (PATENT DUCTUS ARTERIOSUS) Father      Other (TIC DISORDER) Father      Lupus Father's Sister      Anxiety disorder Father's Brother      Anxiety disorder Maternal Grandmother      Anxiety  disorder Maternal Grandfather      Lupus Paternal Grandmother      Hypertension Paternal Grandfather       Social History     Tobacco Use    Smoking status: Never    Smokeless tobacco: Never   Vaping Use    Vaping Use: Never used   Substance Use Topics    Alcohol use: Yes     Comment: social    Drug use: Never       Physical Exam   ED Triage Vitals [03/28/24 0825]   Temperature Heart Rate Respirations BP   37 °C (98.6 °F) 86 16 (!) 130/117      Pulse Ox Temp Source Heart Rate Source Patient Position   100 % Temporal Monitor Sitting      BP Location FiO2 (%)     Right arm --       Physical Exam  Vitals reviewed.   Constitutional:       General: She is awake.      Comments: Appears uncomfortable   HENT:      Head: Normocephalic.      Nose: Nose normal.   Cardiovascular:      Rate and Rhythm: Normal rate and regular rhythm.   Pulmonary:      Effort: Pulmonary effort is normal.      Breath sounds: Normal breath sounds.   Abdominal:      Palpations: Abdomen is soft.   Musculoskeletal:      Cervical back: Normal range of motion.   Skin:     General: Skin is warm.      Capillary Refill: Capillary refill takes less than 2 seconds.   Neurological:      Mental Status: She is alert.      Comments: Patient has 2 pairs of glasses on 1 his sunglasses because of the light.  She has a normal neuroexam including reactive pupils.  She does appear light sensitive.         ED Course & MDM   ED Course as of 03/28/24 1313   Thu Mar 28, 2024   1110 I talked to the patient and mom at bedside.  We discussed the differential diagnosis.  I do not believe this is a brain bleed or brain injury.  I do not believe imaging is required.  I did discuss as an option we discussed risk benefits alternatives but since she has had recent CT scans x 2 of her abdomen pelvis it was decided to hold off.  She was given to medication to try to help her and workup.  Her swabs came back negative her nausea is in proved but she still has bad headache.  She will  be given additional fluids and other medications.  Primary told her that steroids IV may help because of lupus so she was given a dose of that as well. [RZ]   1311 Emergency room and reports feeling much better.  Morphine did not seem to help the Compazine and Reglan did not seem to help the fluids did not seem to help but the steroids seem to help.  Patient reports feeling better still has a slight headache.  Spoke to the patient and mom in the room and patient will be placed on a steroid taper and discharge.  She is stable has some nausea medicine if she needs it and will be given a note for school. [RZ]      ED Course User Index  [RZ] Loyd Moralez MD         Diagnoses as of 03/28/24 1313   Acute intractable headache, unspecified headache type       Medical Decision Making      Procedure  Procedures     Loyd Moralez MD  03/28/24 1313

## 2024-03-28 NOTE — Clinical Note
Allyssa Denzellyla was seen and treated in our emergency department on 3/28/2024.  She may return to school on 03/30/2024.      If you have any questions or concerns, please don't hesitate to call.      Loyd Moralez MD

## 2024-03-31 ENCOUNTER — APPOINTMENT (OUTPATIENT)
Dept: RADIOLOGY | Facility: HOSPITAL | Age: 24
DRG: 392 | End: 2024-03-31
Payer: COMMERCIAL

## 2024-03-31 ENCOUNTER — HOSPITAL ENCOUNTER (INPATIENT)
Facility: HOSPITAL | Age: 24
LOS: 2 days | Discharge: HOME | DRG: 392 | End: 2024-04-05
Attending: EMERGENCY MEDICINE | Admitting: STUDENT IN AN ORGANIZED HEALTH CARE EDUCATION/TRAINING PROGRAM
Payer: COMMERCIAL

## 2024-03-31 DIAGNOSIS — R31.9 URINARY TRACT INFECTION WITH HEMATURIA, SITE UNSPECIFIED: ICD-10-CM

## 2024-03-31 DIAGNOSIS — N39.0 URINARY TRACT INFECTION WITH HEMATURIA, SITE UNSPECIFIED: ICD-10-CM

## 2024-03-31 DIAGNOSIS — K58.1 IRRITABLE BOWEL SYNDROME WITH CONSTIPATION: ICD-10-CM

## 2024-03-31 DIAGNOSIS — R10.84 GENERALIZED ABDOMINAL PAIN: Primary | ICD-10-CM

## 2024-03-31 DIAGNOSIS — R10.9 ABDOMINAL PAIN, UNSPECIFIED ABDOMINAL LOCATION: ICD-10-CM

## 2024-03-31 LAB
ALBUMIN SERPL BCP-MCNC: 4.4 G/DL (ref 3.4–5)
ALP SERPL-CCNC: 70 U/L (ref 33–110)
ALT SERPL W P-5'-P-CCNC: 15 U/L (ref 7–45)
ANION GAP SERPL CALC-SCNC: 17 MMOL/L (ref 10–20)
APPEARANCE UR: CLEAR
AST SERPL W P-5'-P-CCNC: 14 U/L (ref 9–39)
BASOPHILS # BLD AUTO: 0.01 X10*3/UL (ref 0–0.1)
BASOPHILS NFR BLD AUTO: 0.1 %
BILIRUB SERPL-MCNC: 0.3 MG/DL (ref 0–1.2)
BILIRUB UR STRIP.AUTO-MCNC: NEGATIVE MG/DL
BUN SERPL-MCNC: 8 MG/DL (ref 6–23)
CALCIUM SERPL-MCNC: 8.7 MG/DL (ref 8.6–10.3)
CARDIAC TROPONIN I PNL SERPL HS: <3 NG/L (ref 0–13)
CHLORIDE SERPL-SCNC: 103 MMOL/L (ref 98–107)
CO2 SERPL-SCNC: 25 MMOL/L (ref 21–32)
COLOR UR: NORMAL
CREAT SERPL-MCNC: 0.81 MG/DL (ref 0.5–1.05)
EGFRCR SERPLBLD CKD-EPI 2021: >90 ML/MIN/1.73M*2
EOSINOPHIL # BLD AUTO: 0 X10*3/UL (ref 0–0.7)
EOSINOPHIL NFR BLD AUTO: 0 %
ERYTHROCYTE [DISTWIDTH] IN BLOOD BY AUTOMATED COUNT: 13.7 % (ref 11.5–14.5)
GLUCOSE SERPL-MCNC: 90 MG/DL (ref 74–99)
GLUCOSE UR STRIP.AUTO-MCNC: NEGATIVE MG/DL
HCG UR QL IA.RAPID: NEGATIVE
HCT VFR BLD AUTO: 44.1 % (ref 36–46)
HGB BLD-MCNC: 14.6 G/DL (ref 12–16)
IMM GRANULOCYTES # BLD AUTO: 0.02 X10*3/UL (ref 0–0.7)
IMM GRANULOCYTES NFR BLD AUTO: 0.3 % (ref 0–0.9)
KETONES UR STRIP.AUTO-MCNC: NEGATIVE MG/DL
LACTATE SERPL-SCNC: 1.1 MMOL/L (ref 0.4–2)
LEUKOCYTE ESTERASE UR QL STRIP.AUTO: NEGATIVE
LIPASE SERPL-CCNC: 12 U/L (ref 9–82)
LYMPHOCYTES # BLD AUTO: 0.72 X10*3/UL (ref 1.2–4.8)
LYMPHOCYTES NFR BLD AUTO: 10.5 %
MAGNESIUM SERPL-MCNC: 2.19 MG/DL (ref 1.6–2.4)
MCH RBC QN AUTO: 28.1 PG (ref 26–34)
MCHC RBC AUTO-ENTMCNC: 33.1 G/DL (ref 32–36)
MCV RBC AUTO: 85 FL (ref 80–100)
MONOCYTES # BLD AUTO: 0.3 X10*3/UL (ref 0.1–1)
MONOCYTES NFR BLD AUTO: 4.4 %
NEUTROPHILS # BLD AUTO: 5.82 X10*3/UL (ref 1.2–7.7)
NEUTROPHILS NFR BLD AUTO: 84.7 %
NITRITE UR QL STRIP.AUTO: NEGATIVE
NRBC BLD-RTO: 0 /100 WBCS (ref 0–0)
PH UR STRIP.AUTO: 8 [PH]
PLATELET # BLD AUTO: 258 X10*3/UL (ref 150–450)
POTASSIUM SERPL-SCNC: 3.7 MMOL/L (ref 3.5–5.3)
PROT SERPL-MCNC: 7.6 G/DL (ref 6.4–8.2)
PROT UR STRIP.AUTO-MCNC: NEGATIVE MG/DL
RBC # BLD AUTO: 5.19 X10*6/UL (ref 4–5.2)
RBC # UR STRIP.AUTO: NEGATIVE /UL
SODIUM SERPL-SCNC: 141 MMOL/L (ref 136–145)
SP GR UR STRIP.AUTO: 1.01
UROBILINOGEN UR STRIP.AUTO-MCNC: <2 MG/DL
WBC # BLD AUTO: 6.9 X10*3/UL (ref 4.4–11.3)

## 2024-03-31 PROCEDURE — 80053 COMPREHEN METABOLIC PANEL: CPT | Performed by: HEALTH CARE PROVIDER

## 2024-03-31 PROCEDURE — 96375 TX/PRO/DX INJ NEW DRUG ADDON: CPT

## 2024-03-31 PROCEDURE — 83735 ASSAY OF MAGNESIUM: CPT | Performed by: HEALTH CARE PROVIDER

## 2024-03-31 PROCEDURE — 96374 THER/PROPH/DIAG INJ IV PUSH: CPT

## 2024-03-31 PROCEDURE — 2500000004 HC RX 250 GENERAL PHARMACY W/ HCPCS (ALT 636 FOR OP/ED): Performed by: EMERGENCY MEDICINE

## 2024-03-31 PROCEDURE — 81003 URINALYSIS AUTO W/O SCOPE: CPT | Performed by: HEALTH CARE PROVIDER

## 2024-03-31 PROCEDURE — 83690 ASSAY OF LIPASE: CPT | Performed by: HEALTH CARE PROVIDER

## 2024-03-31 PROCEDURE — 84484 ASSAY OF TROPONIN QUANT: CPT | Performed by: HEALTH CARE PROVIDER

## 2024-03-31 PROCEDURE — 36415 COLL VENOUS BLD VENIPUNCTURE: CPT | Performed by: HEALTH CARE PROVIDER

## 2024-03-31 PROCEDURE — 83605 ASSAY OF LACTIC ACID: CPT | Performed by: HEALTH CARE PROVIDER

## 2024-03-31 PROCEDURE — 2550000001 HC RX 255 CONTRASTS: Performed by: EMERGENCY MEDICINE

## 2024-03-31 PROCEDURE — 96361 HYDRATE IV INFUSION ADD-ON: CPT

## 2024-03-31 PROCEDURE — 74177 CT ABD & PELVIS W/CONTRAST: CPT

## 2024-03-31 PROCEDURE — 81025 URINE PREGNANCY TEST: CPT | Performed by: HEALTH CARE PROVIDER

## 2024-03-31 PROCEDURE — 74177 CT ABD & PELVIS W/CONTRAST: CPT | Performed by: RADIOLOGY

## 2024-03-31 PROCEDURE — 85025 COMPLETE CBC W/AUTO DIFF WBC: CPT | Performed by: HEALTH CARE PROVIDER

## 2024-03-31 PROCEDURE — 2500000004 HC RX 250 GENERAL PHARMACY W/ HCPCS (ALT 636 FOR OP/ED): Performed by: HEALTH CARE PROVIDER

## 2024-03-31 PROCEDURE — 99285 EMERGENCY DEPT VISIT HI MDM: CPT | Mod: 25

## 2024-03-31 RX ORDER — ONDANSETRON HYDROCHLORIDE 2 MG/ML
4 INJECTION, SOLUTION INTRAVENOUS ONCE
Status: COMPLETED | OUTPATIENT
Start: 2024-03-31 | End: 2024-03-31

## 2024-03-31 RX ORDER — MORPHINE SULFATE 4 MG/ML
4 INJECTION INTRAVENOUS ONCE
Status: COMPLETED | OUTPATIENT
Start: 2024-03-31 | End: 2024-04-01

## 2024-03-31 RX ORDER — KETOROLAC TROMETHAMINE 15 MG/ML
15 INJECTION, SOLUTION INTRAMUSCULAR; INTRAVENOUS ONCE
Status: COMPLETED | OUTPATIENT
Start: 2024-03-31 | End: 2024-03-31

## 2024-03-31 RX ADMIN — IOHEXOL 75 ML: 350 INJECTION, SOLUTION INTRAVENOUS at 22:45

## 2024-03-31 RX ADMIN — KETOROLAC TROMETHAMINE 15 MG: 15 INJECTION, SOLUTION INTRAMUSCULAR; INTRAVENOUS at 22:49

## 2024-03-31 RX ADMIN — SODIUM CHLORIDE, POTASSIUM CHLORIDE, SODIUM LACTATE AND CALCIUM CHLORIDE 1000 ML: 600; 310; 30; 20 INJECTION, SOLUTION INTRAVENOUS at 21:11

## 2024-03-31 RX ADMIN — ONDANSETRON 4 MG: 2 INJECTION INTRAMUSCULAR; INTRAVENOUS at 21:10

## 2024-03-31 ASSESSMENT — LIFESTYLE VARIABLES
EVER FELT BAD OR GUILTY ABOUT YOUR DRINKING: NO
HAVE PEOPLE ANNOYED YOU BY CRITICIZING YOUR DRINKING: NO
HAVE YOU EVER FELT YOU SHOULD CUT DOWN ON YOUR DRINKING: NO
EVER HAD A DRINK FIRST THING IN THE MORNING TO STEADY YOUR NERVES TO GET RID OF A HANGOVER: NO
TOTAL SCORE: 0

## 2024-03-31 ASSESSMENT — PAIN SCALES - GENERAL
PAINLEVEL_OUTOF10: 10 - WORST POSSIBLE PAIN
PAINLEVEL_OUTOF10: 10 - WORST POSSIBLE PAIN

## 2024-03-31 ASSESSMENT — PAIN DESCRIPTION - LOCATION
LOCATION: ABDOMEN
LOCATION: ABDOMEN

## 2024-03-31 ASSESSMENT — PAIN - FUNCTIONAL ASSESSMENT
PAIN_FUNCTIONAL_ASSESSMENT: 0-10
PAIN_FUNCTIONAL_ASSESSMENT: 0-10

## 2024-03-31 NOTE — LETTER
April 5, 2024     Patient: Allyssa Kelly   YOB: 2000   Date of Visit: 3/31/2024       To Whom It May Concern:    Allyssa Kelly was an inpatient at [facility] from 4/1/2024 - 4/5/2024. Please excuse Allyssa for her absence from school during this time    If you have any questions or concerns, please don't hesitate to call.         Sincerely,         Juan Pablo Swanson, DO

## 2024-04-01 ENCOUNTER — APPOINTMENT (OUTPATIENT)
Dept: CARDIOLOGY | Facility: HOSPITAL | Age: 24
DRG: 392 | End: 2024-04-01
Payer: COMMERCIAL

## 2024-04-01 PROBLEM — R10.9 ABDOMINAL PAIN: Status: ACTIVE | Noted: 2024-04-01

## 2024-04-01 LAB
ANION GAP SERPL CALC-SCNC: 13 MMOL/L (ref 10–20)
BUN SERPL-MCNC: 8 MG/DL (ref 6–23)
CALCIUM SERPL-MCNC: 9.3 MG/DL (ref 8.6–10.3)
CHLORIDE SERPL-SCNC: 102 MMOL/L (ref 98–107)
CO2 SERPL-SCNC: 25 MMOL/L (ref 21–32)
CREAT SERPL-MCNC: 0.68 MG/DL (ref 0.5–1.05)
EGFRCR SERPLBLD CKD-EPI 2021: >90 ML/MIN/1.73M*2
GLUCOSE SERPL-MCNC: 113 MG/DL (ref 74–99)
HOLD SPECIMEN: NORMAL
HOLD SPECIMEN: NORMAL
LACTATE SERPL-SCNC: 1.1 MMOL/L (ref 0.4–2)
POTASSIUM SERPL-SCNC: 4 MMOL/L (ref 3.5–5.3)
SODIUM SERPL-SCNC: 136 MMOL/L (ref 136–145)

## 2024-04-01 PROCEDURE — G0378 HOSPITAL OBSERVATION PER HR: HCPCS

## 2024-04-01 PROCEDURE — 2500000001 HC RX 250 WO HCPCS SELF ADMINISTERED DRUGS (ALT 637 FOR MEDICARE OP)

## 2024-04-01 PROCEDURE — 80048 BASIC METABOLIC PNL TOTAL CA: CPT

## 2024-04-01 PROCEDURE — 99222 1ST HOSP IP/OBS MODERATE 55: CPT

## 2024-04-01 PROCEDURE — 96375 TX/PRO/DX INJ NEW DRUG ADDON: CPT

## 2024-04-01 PROCEDURE — 36415 COLL VENOUS BLD VENIPUNCTURE: CPT

## 2024-04-01 PROCEDURE — 82728 ASSAY OF FERRITIN: CPT | Performed by: STUDENT IN AN ORGANIZED HEALTH CARE EDUCATION/TRAINING PROGRAM

## 2024-04-01 PROCEDURE — 2500000004 HC RX 250 GENERAL PHARMACY W/ HCPCS (ALT 636 FOR OP/ED)

## 2024-04-01 PROCEDURE — 99222 1ST HOSP IP/OBS MODERATE 55: CPT | Performed by: NURSE PRACTITIONER

## 2024-04-01 PROCEDURE — 2500000001 HC RX 250 WO HCPCS SELF ADMINISTERED DRUGS (ALT 637 FOR MEDICARE OP): Performed by: NURSE PRACTITIONER

## 2024-04-01 PROCEDURE — 2500000001 HC RX 250 WO HCPCS SELF ADMINISTERED DRUGS (ALT 637 FOR MEDICARE OP): Performed by: INTERNAL MEDICINE

## 2024-04-01 PROCEDURE — 2500000004 HC RX 250 GENERAL PHARMACY W/ HCPCS (ALT 636 FOR OP/ED): Performed by: INTERNAL MEDICINE

## 2024-04-01 PROCEDURE — 83605 ASSAY OF LACTIC ACID: CPT | Performed by: INTERNAL MEDICINE

## 2024-04-01 PROCEDURE — 86140 C-REACTIVE PROTEIN: CPT | Performed by: STUDENT IN AN ORGANIZED HEALTH CARE EDUCATION/TRAINING PROGRAM

## 2024-04-01 PROCEDURE — 36415 COLL VENOUS BLD VENIPUNCTURE: CPT | Performed by: INTERNAL MEDICINE

## 2024-04-01 PROCEDURE — 2500000004 HC RX 250 GENERAL PHARMACY W/ HCPCS (ALT 636 FOR OP/ED): Performed by: EMERGENCY MEDICINE

## 2024-04-01 PROCEDURE — 93005 ELECTROCARDIOGRAM TRACING: CPT

## 2024-04-01 RX ORDER — CLONIDINE HYDROCHLORIDE 0.1 MG/1
0.2 TABLET ORAL NIGHTLY
Status: DISCONTINUED | OUTPATIENT
Start: 2024-04-01 | End: 2024-04-05 | Stop reason: HOSPADM

## 2024-04-01 RX ORDER — ACETAMINOPHEN 160 MG/5ML
650 SOLUTION ORAL EVERY 4 HOURS PRN
Status: DISCONTINUED | OUTPATIENT
Start: 2024-04-01 | End: 2024-04-05 | Stop reason: HOSPADM

## 2024-04-01 RX ORDER — SUMATRIPTAN 50 MG/1
50 TABLET, FILM COATED ORAL EVERY 6 HOURS PRN
Status: DISCONTINUED | OUTPATIENT
Start: 2024-04-01 | End: 2024-04-05 | Stop reason: HOSPADM

## 2024-04-01 RX ORDER — METHOTREXATE 2.5 MG/1
15 TABLET ORAL
Status: DISCONTINUED | OUTPATIENT
Start: 2024-04-01 | End: 2024-04-05 | Stop reason: HOSPADM

## 2024-04-01 RX ORDER — FOLIC ACID 1 MG/1
1 TABLET ORAL DAILY
Status: DISCONTINUED | OUTPATIENT
Start: 2024-04-01 | End: 2024-04-01

## 2024-04-01 RX ORDER — POLYETHYLENE GLYCOL 3350 17 G/17G
17 POWDER, FOR SOLUTION ORAL DAILY PRN
Status: DISCONTINUED | OUTPATIENT
Start: 2024-04-01 | End: 2024-04-05 | Stop reason: HOSPADM

## 2024-04-01 RX ORDER — MORPHINE SULFATE 4 MG/ML
4 INJECTION INTRAVENOUS EVERY 4 HOURS PRN
Status: DISCONTINUED | OUTPATIENT
Start: 2024-04-01 | End: 2024-04-02

## 2024-04-01 RX ORDER — FLUOXETINE HYDROCHLORIDE 20 MG/1
40 CAPSULE ORAL DAILY
Status: DISCONTINUED | OUTPATIENT
Start: 2024-04-01 | End: 2024-04-05 | Stop reason: HOSPADM

## 2024-04-01 RX ORDER — ONDANSETRON 4 MG/1
4 TABLET, ORALLY DISINTEGRATING ORAL EVERY 8 HOURS PRN
Status: DISCONTINUED | OUTPATIENT
Start: 2024-04-01 | End: 2024-04-01

## 2024-04-01 RX ORDER — ACETAMINOPHEN 325 MG/1
650 TABLET ORAL EVERY 4 HOURS PRN
Status: DISCONTINUED | OUTPATIENT
Start: 2024-04-01 | End: 2024-04-05 | Stop reason: HOSPADM

## 2024-04-01 RX ORDER — KETOROLAC TROMETHAMINE 30 MG/ML
30 INJECTION, SOLUTION INTRAMUSCULAR; INTRAVENOUS EVERY 6 HOURS PRN
Status: DISCONTINUED | OUTPATIENT
Start: 2024-04-01 | End: 2024-04-05 | Stop reason: HOSPADM

## 2024-04-01 RX ORDER — PREDNISONE 20 MG/1
40 TABLET ORAL DAILY
Status: DISCONTINUED | OUTPATIENT
Start: 2024-04-01 | End: 2024-04-01

## 2024-04-01 RX ORDER — MIRTAZAPINE 15 MG/1
7.5 TABLET, FILM COATED ORAL NIGHTLY
Status: DISCONTINUED | OUTPATIENT
Start: 2024-04-01 | End: 2024-04-05 | Stop reason: HOSPADM

## 2024-04-01 RX ORDER — DICYCLOMINE HYDROCHLORIDE 10 MG/1
10 CAPSULE ORAL 4 TIMES DAILY
Status: DISCONTINUED | OUTPATIENT
Start: 2024-04-01 | End: 2024-04-04

## 2024-04-01 RX ORDER — SYRING-NEEDL,DISP,INSUL,0.3 ML 29 G X1/2"
296 SYRINGE, EMPTY DISPOSABLE MISCELLANEOUS ONCE
Status: COMPLETED | OUTPATIENT
Start: 2024-04-01 | End: 2024-04-01

## 2024-04-01 RX ORDER — ONDANSETRON HYDROCHLORIDE 2 MG/ML
4 INJECTION, SOLUTION INTRAVENOUS EVERY 8 HOURS PRN
Status: DISCONTINUED | OUTPATIENT
Start: 2024-04-01 | End: 2024-04-03

## 2024-04-01 RX ORDER — SODIUM CHLORIDE, SODIUM LACTATE, POTASSIUM CHLORIDE, CALCIUM CHLORIDE 600; 310; 30; 20 MG/100ML; MG/100ML; MG/100ML; MG/100ML
100 INJECTION, SOLUTION INTRAVENOUS CONTINUOUS
Status: ACTIVE | OUTPATIENT
Start: 2024-04-01 | End: 2024-04-01

## 2024-04-01 RX ORDER — BISACODYL 5 MG
10 TABLET, DELAYED RELEASE (ENTERIC COATED) ORAL ONCE
Status: COMPLETED | OUTPATIENT
Start: 2024-04-01 | End: 2024-04-01

## 2024-04-01 RX ORDER — ACETAMINOPHEN 650 MG/1
650 SUPPOSITORY RECTAL EVERY 4 HOURS PRN
Status: DISCONTINUED | OUTPATIENT
Start: 2024-04-01 | End: 2024-04-05 | Stop reason: HOSPADM

## 2024-04-01 RX ORDER — FOLIC ACID 1 MG/1
1 TABLET ORAL
Status: DISCONTINUED | OUTPATIENT
Start: 2024-04-02 | End: 2024-04-05 | Stop reason: HOSPADM

## 2024-04-01 RX ORDER — SODIUM CHLORIDE 9 MG/ML
75 INJECTION, SOLUTION INTRAVENOUS CONTINUOUS
Status: DISCONTINUED | OUTPATIENT
Start: 2024-04-01 | End: 2024-04-01

## 2024-04-01 RX ORDER — ONDANSETRON 4 MG/1
4 TABLET, FILM COATED ORAL EVERY 8 HOURS PRN
Status: DISCONTINUED | OUTPATIENT
Start: 2024-04-01 | End: 2024-04-03

## 2024-04-01 RX ORDER — TRAMADOL HYDROCHLORIDE 50 MG/1
50 TABLET ORAL EVERY 6 HOURS PRN
Status: DISCONTINUED | OUTPATIENT
Start: 2024-04-01 | End: 2024-04-02

## 2024-04-01 RX ADMIN — DICYCLOMINE HYDROCHLORIDE 10 MG: 10 CAPSULE ORAL at 12:25

## 2024-04-01 RX ADMIN — METHOTREXATE 15 MG: 2.5 TABLET ORAL at 08:12

## 2024-04-01 RX ADMIN — TRAMADOL HYDROCHLORIDE 50 MG: 50 TABLET, COATED ORAL at 19:52

## 2024-04-01 RX ADMIN — MIRTAZAPINE 7.5 MG: 15 TABLET, FILM COATED ORAL at 02:12

## 2024-04-01 RX ADMIN — HYDROMORPHONE HYDROCHLORIDE 0.4 MG: 1 INJECTION, SOLUTION INTRAMUSCULAR; INTRAVENOUS; SUBCUTANEOUS at 18:12

## 2024-04-01 RX ADMIN — MORPHINE SULFATE 4 MG: 4 INJECTION INTRAVENOUS at 00:28

## 2024-04-01 RX ADMIN — HYDROMORPHONE HYDROCHLORIDE 0.4 MG: 1 INJECTION, SOLUTION INTRAMUSCULAR; INTRAVENOUS; SUBCUTANEOUS at 09:09

## 2024-04-01 RX ADMIN — DICYCLOMINE HYDROCHLORIDE 10 MG: 10 CAPSULE ORAL at 06:16

## 2024-04-01 RX ADMIN — MAGNESIUM CITRATE 296 ML: 1.75 LIQUID ORAL at 12:26

## 2024-04-01 RX ADMIN — HYDROMORPHONE HYDROCHLORIDE 0.4 MG: 1 INJECTION, SOLUTION INTRAMUSCULAR; INTRAVENOUS; SUBCUTANEOUS at 03:55

## 2024-04-01 RX ADMIN — HYDROMORPHONE HYDROCHLORIDE 0.4 MG: 1 INJECTION, SOLUTION INTRAMUSCULAR; INTRAVENOUS; SUBCUTANEOUS at 21:25

## 2024-04-01 RX ADMIN — MIRTAZAPINE 7.5 MG: 15 TABLET, FILM COATED ORAL at 20:53

## 2024-04-01 RX ADMIN — TRAMADOL HYDROCHLORIDE 50 MG: 50 TABLET, COATED ORAL at 02:55

## 2024-04-01 RX ADMIN — TRAMADOL HYDROCHLORIDE 50 MG: 50 TABLET, COATED ORAL at 13:51

## 2024-04-01 RX ADMIN — CLONIDINE HYDROCHLORIDE 0.2 MG: 0.1 TABLET ORAL at 20:54

## 2024-04-01 RX ADMIN — METHYLPREDNISOLONE SODIUM SUCCINATE 60 MG: 125 INJECTION, POWDER, FOR SOLUTION INTRAMUSCULAR; INTRAVENOUS at 00:28

## 2024-04-01 RX ADMIN — SODIUM CHLORIDE, POTASSIUM CHLORIDE, SODIUM LACTATE AND CALCIUM CHLORIDE 100 ML/HR: 600; 310; 30; 20 INJECTION, SOLUTION INTRAVENOUS at 12:25

## 2024-04-01 RX ADMIN — TRAMADOL HYDROCHLORIDE 50 MG: 50 TABLET, COATED ORAL at 08:11

## 2024-04-01 RX ADMIN — HYDROMORPHONE HYDROCHLORIDE 0.4 MG: 1 INJECTION, SOLUTION INTRAMUSCULAR; INTRAVENOUS; SUBCUTANEOUS at 15:07

## 2024-04-01 RX ADMIN — HYDROMORPHONE HYDROCHLORIDE 0.4 MG: 1 INJECTION, SOLUTION INTRAMUSCULAR; INTRAVENOUS; SUBCUTANEOUS at 11:25

## 2024-04-01 RX ADMIN — HYDROMORPHONE HYDROCHLORIDE 0.4 MG: 1 INJECTION, SOLUTION INTRAMUSCULAR; INTRAVENOUS; SUBCUTANEOUS at 05:56

## 2024-04-01 RX ADMIN — BISACODYL 10 MG: 5 TABLET, COATED ORAL at 12:25

## 2024-04-01 RX ADMIN — DROSPIRENONE 1 TABLET: 4 TABLET, FILM COATED ORAL at 23:29

## 2024-04-01 RX ADMIN — SODIUM CHLORIDE 75 ML/HR: 9 INJECTION, SOLUTION INTRAVENOUS at 01:43

## 2024-04-01 RX ADMIN — SODIUM CHLORIDE, POTASSIUM CHLORIDE, SODIUM LACTATE AND CALCIUM CHLORIDE 100 ML/HR: 600; 310; 30; 20 INJECTION, SOLUTION INTRAVENOUS at 02:13

## 2024-04-01 RX ADMIN — FLUOXETINE HYDROCHLORIDE 40 MG: 20 CAPSULE ORAL at 08:11

## 2024-04-01 SDOH — SOCIAL STABILITY: SOCIAL INSECURITY: HAVE YOU HAD THOUGHTS OF HARMING ANYONE ELSE?: NO

## 2024-04-01 SDOH — SOCIAL STABILITY: SOCIAL INSECURITY: DOES ANYONE TRY TO KEEP YOU FROM HAVING/CONTACTING OTHER FRIENDS OR DOING THINGS OUTSIDE YOUR HOME?: NO

## 2024-04-01 SDOH — SOCIAL STABILITY: SOCIAL INSECURITY: DO YOU FEEL UNSAFE GOING BACK TO THE PLACE WHERE YOU ARE LIVING?: NO

## 2024-04-01 SDOH — SOCIAL STABILITY: SOCIAL INSECURITY: ARE YOU OR HAVE YOU BEEN THREATENED OR ABUSED PHYSICALLY, EMOTIONALLY, OR SEXUALLY BY ANYONE?: NO

## 2024-04-01 SDOH — SOCIAL STABILITY: SOCIAL INSECURITY: DO YOU FEEL ANYONE HAS EXPLOITED OR TAKEN ADVANTAGE OF YOU FINANCIALLY OR OF YOUR PERSONAL PROPERTY?: NO

## 2024-04-01 SDOH — SOCIAL STABILITY: SOCIAL INSECURITY: ARE THERE ANY APPARENT SIGNS OF INJURIES/BEHAVIORS THAT COULD BE RELATED TO ABUSE/NEGLECT?: NO

## 2024-04-01 SDOH — SOCIAL STABILITY: SOCIAL INSECURITY: ABUSE: ADULT

## 2024-04-01 SDOH — SOCIAL STABILITY: SOCIAL INSECURITY: HAS ANYONE EVER THREATENED TO HURT YOUR FAMILY OR YOUR PETS?: NO

## 2024-04-01 ASSESSMENT — COGNITIVE AND FUNCTIONAL STATUS - GENERAL
DAILY ACTIVITIY SCORE: 24
DAILY ACTIVITIY SCORE: 24
PATIENT BASELINE BEDBOUND: NO
MOBILITY SCORE: 24
MOBILITY SCORE: 24

## 2024-04-01 ASSESSMENT — PAIN SCALES - GENERAL
PAINLEVEL_OUTOF10: 8
PAINLEVEL_OUTOF10: 6
PAINLEVEL_OUTOF10: 10 - WORST POSSIBLE PAIN
PAINLEVEL_OUTOF10: 5 - MODERATE PAIN
PAINLEVEL_OUTOF10: 7
PAINLEVEL_OUTOF10: 7
PAINLEVEL_OUTOF10: 8
PAINLEVEL_OUTOF10: 10 - WORST POSSIBLE PAIN
PAINLEVEL_OUTOF10: 7
PAINLEVEL_OUTOF10: 10 - WORST POSSIBLE PAIN
PAINLEVEL_OUTOF10: 10 - WORST POSSIBLE PAIN
PAINLEVEL_OUTOF10: 8
PAINLEVEL_OUTOF10: 8
PAINLEVEL_OUTOF10: 10 - WORST POSSIBLE PAIN
PAINLEVEL_OUTOF10: 9
PAINLEVEL_OUTOF10: 8
PAINLEVEL_OUTOF10: 9
PAINLEVEL_OUTOF10: 6
PAINLEVEL_OUTOF10: 5 - MODERATE PAIN
PAINLEVEL_OUTOF10: 7

## 2024-04-01 ASSESSMENT — PAIN DESCRIPTION - LOCATION
LOCATION: ABDOMEN

## 2024-04-01 ASSESSMENT — LIFESTYLE VARIABLES
HOW OFTEN DO YOU HAVE 6 OR MORE DRINKS ON ONE OCCASION: NEVER
AUDIT-C TOTAL SCORE: 1
SKIP TO QUESTIONS 9-10: 1
HOW OFTEN DO YOU HAVE A DRINK CONTAINING ALCOHOL: MONTHLY OR LESS
AUDIT-C TOTAL SCORE: 1
HOW MANY STANDARD DRINKS CONTAINING ALCOHOL DO YOU HAVE ON A TYPICAL DAY: 1 OR 2

## 2024-04-01 ASSESSMENT — ACTIVITIES OF DAILY LIVING (ADL)
LACK_OF_TRANSPORTATION: NO
PATIENT'S MEMORY ADEQUATE TO SAFELY COMPLETE DAILY ACTIVITIES?: YES
HEARING - LEFT EAR: FUNCTIONAL
WALKS IN HOME: INDEPENDENT
LACK_OF_TRANSPORTATION: NO
FEEDING YOURSELF: INDEPENDENT
DRESSING YOURSELF: INDEPENDENT
GROOMING: INDEPENDENT
JUDGMENT_ADEQUATE_SAFELY_COMPLETE_DAILY_ACTIVITIES: YES
BATHING: INDEPENDENT
HEARING - RIGHT EAR: FUNCTIONAL
TOILETING: INDEPENDENT
ADEQUATE_TO_COMPLETE_ADL: YES

## 2024-04-01 ASSESSMENT — PAIN - FUNCTIONAL ASSESSMENT

## 2024-04-01 NOTE — CONSULTS
Reason For Consult  Abdominal pain, bloating     History Of Present Illness  Allyssa Kelly is a 24 y.o. female presenting with complaints of abdominal pain and bloating. Has had these symptoms intermittently for about the last year. Was seen in GI clinic, has been ruled out for SIBO, celiac and H Pylori. Tells me that she runs from diarrhea to regular bowel movements. Does note significant post prandial symptoms. Dairy does cause the diarrhea to be worse, avoids it mostly or takes lactaid pills. Over the past 2-3 days notes worsening bloating and abdominal pain. Pain is mostly lower. Nothing but pain medicine has seem to make it better. Eating may have made it worse. She did try an enema which she moved small amount of stool but mostly water. The pain was worse so she came in. Was also seen in the ED for this in early March, at that time a CT scan showed constipation, she did not feel constipated at that time. Does not take anything routinely for her bowels. She has been on OCP for years, recently switched to copper IUD which had to be removed and now back on OCP but no period since December. Also started on methotrexate aroun that time. No family hx of IBD. Paternal great grandmother with CRC. No prior Colonoscopy.      Past Medical History  She has a past medical history of Acute pharyngitis, unspecified (07/21/2014), Acute pharyngitis, unspecified (04/18/2017), Acute tonsillitis, unspecified (03/22/2021), Allergy, unspecified, initial encounter (10/08/2021), Anxiety, Arthralgia of temporomandibular joint, unspecified side (04/07/2014), Body mass index (BMI) 19.9 or less, adult (09/17/2021), Body mass index (BMI) pediatric, 5th percentile to less than 85th percentile for age (09/17/2021), Calculus of kidney with calculus of ureter (02/12/2022), Candidiasis of skin and nail (08/31/2020), Cellulitis of left toe (04/06/2018), Contact with and (suspected) exposure to other viral communicable diseases  (02/13/2020), COVID-19 (12/30/2020), Displaced fracture of shaft of third metacarpal bone, left hand, initial encounter for closed fracture (12/05/2016), Encounter for general adult medical examination without abnormal findings (09/17/2021), Encounter for pre-employment examination (09/17/2021), Encounter for routine child health examination without abnormal findings (10/03/2018), Hordeolum externum right upper eyelid (11/19/2021), Jaw pain (10/07/2020), Joint derangement, unspecified (10/15/2021), Local infection of the skin and subcutaneous tissue, unspecified (04/26/2022), Localized swelling, mass and lump, head (04/28/2017), Nonscarring hair loss, unspecified (10/03/2018), Other chronic diseases of tonsils and adenoids (04/28/2017), Other conditions influencing health status (11/18/2021), Other diseases of tongue (04/28/2017), Other local lupus erythematosus (10/19/2022), Other specified respiratory disorders (02/18/2019), Pain in left arm (11/22/2021), Pain in right ankle and joints of right foot (09/23/2021), Pain in right wrist (06/08/2016), Pain in unspecified knee (09/30/2014), Pain in unspecified shoulder (10/27/2014), Pain in unspecified wrist (11/22/2021), Panic attacks, Personal history of diseases of the skin and subcutaneous tissue (08/12/2017), Personal history of diseases of the skin and subcutaneous tissue (12/30/2020), Personal history of diseases of the skin and subcutaneous tissue (04/06/2015), Personal history of other (healed) physical injury and trauma (11/19/2018), Personal history of other diseases of the digestive system (02/13/2020), Personal history of other diseases of the female genital tract (08/12/2017), Personal history of other diseases of the musculoskeletal system and connective tissue (12/14/2017), Personal history of other diseases of the musculoskeletal system and connective tissue (05/10/2022), Personal history of other diseases of the respiratory system (11/19/2021),  Personal history of other diseases of the respiratory system (01/17/2017), Personal history of other diseases of the respiratory system (02/18/2019), Personal history of other diseases of the respiratory system (07/21/2014), Personal history of other diseases of the respiratory system (01/07/2015), Personal history of other diseases of the respiratory system (01/17/2017), Personal history of other diseases of urinary system (02/12/2022), Personal history of other infectious and parasitic diseases (07/21/2014), Personal history of other specified conditions (02/12/2022), Personal history of other specified conditions (02/12/2022), Personal history of other specified conditions (09/20/2021), Personal history of other specified conditions (10/15/2021), Personal history of other specified conditions (10/15/2021), Personal history of other specified conditions (04/05/2021), Personal history of other specified conditions (02/18/2019), Personal history of other specified conditions (03/15/2021), Personal history of other specified conditions (02/21/2020), Personal history of other specified conditions (12/01/2016), Personal history of other specified conditions (01/07/2015), Personal history of other specified conditions (01/07/2015), PONV (postoperative nausea and vomiting), Premenstrual dysphoric disorder (01/25/2016), Raised antibody titer (02/24/2020), Right lower quadrant pain (02/21/2020), Sprain of interphalangeal joint of right middle finger, initial encounter (03/06/2018), Strain of unspecified muscle(s) and tendon(s) at lower leg level, left leg, initial encounter (12/10/2018), Superficial mycosis, unspecified (10/17/2015), Tic disorder, unspecified (11/01/2021), TMJ (dislocation of temporomandibular joint), Unspecified abdominal pain (12/01/2016), Unspecified acute conjunctivitis, left eye (12/01/2016), Unspecified conjunctivitis (11/13/2015), Unspecified fracture of left wrist and hand, initial encounter for  closed fracture (12/29/2016), Unspecified injury of left wrist, hand and finger(s), initial encounter (12/03/2016), Unspecified injury of right lower leg, initial encounter (12/07/2015), Unspecified injury of right wrist, hand and finger(s), initial encounter (03/28/2017), Unspecified injury of right wrist, hand and finger(s), initial encounter (03/06/2018), Unspecified injury of unspecified foot, initial encounter (05/02/2015), Unspecified injury of unspecified wrist, hand and finger(s), initial encounter, Unspecified renal colic (02/12/2022), and Vomiting, unspecified (02/25/2020).    Surgical History  She has a past surgical history that includes Other surgical history (12/11/2020); Other surgical history (10/19/2020); Other surgical history (03/25/2022); Other surgical history (09/16/2020); and Other surgical history (05/07/2021).     Social History  She reports that she has never smoked. She has never used smokeless tobacco. She reports current alcohol use. She reports that she does not use drugs.    Family History  Family History   Problem Relation Name Age of Onset    Anxiety disorder Mother      Hodgkin's lymphoma Mother      Breast cancer Mother      Hypertension Father      Other (PATENT DUCTUS ARTERIOSUS) Father      Other (TIC DISORDER) Father      Lupus Father's Sister      Anxiety disorder Father's Brother      Anxiety disorder Maternal Grandmother      Anxiety disorder Maternal Grandfather      Lupus Paternal Grandmother      Hypertension Paternal Grandfather          Allergies  Tretinoin    Review of Systems  A12 point ROS negative, pertinent positives noted in HPI       Physical Exam  General: Alert and awake, NAD,   HENT: normocephalic, PERRL, anicteric  Skin: no jaundice, intact   RESP: Nonlabored on RA  CARD: RRR  GI: mild to moderately distended, diffuse abdominal pain,  Extremities: no edema  Neuro: A&Ox3, no asterixis  Psych: Calm and cooperative      Last Recorded Vitals  Blood pressure  "135/85, pulse 102, temperature 36.3 °C (97.3 °F), resp. rate 18, height 1.753 m (5' 9\"), weight 72.6 kg (160 lb), SpO2 97 %.    Relevant Results  Scheduled medications  bisacodyl, 10 mg, oral, Once  cloNIDine, 0.2 mg, oral, Nightly  dicyclomine, 10 mg, oral, 4x daily  FLUoxetine, 40 mg, oral, Daily  [START ON 4/2/2024] folic acid, 1 mg, oral, Once per day on Sun Tue Wed Thu Fri Sat  lactated Ringer's, 1,000 mL, intravenous, Once  magnesium citrate, 296 mL, oral, Once  methotrexate, 15 mg, oral, Weekly  mirtazapine, 7.5 mg, oral, Nightly      Continuous medications  lactated Ringer's, 100 mL/hr, Last Rate: 100 mL/hr (04/01/24 0213)      PRN medications  PRN medications: acetaminophen **OR** acetaminophen **OR** acetaminophen, HYDROmorphone, ketorolac, morphine, ondansetron **OR** ondansetron, polyethylene glycol, SUMAtriptan, traMADol    Results for orders placed or performed during the hospital encounter of 03/31/24 (from the past 24 hour(s))   CBC and Auto Differential   Result Value Ref Range    WBC 6.9 4.4 - 11.3 x10*3/uL    nRBC 0.0 0.0 - 0.0 /100 WBCs    RBC 5.19 4.00 - 5.20 x10*6/uL    Hemoglobin 14.6 12.0 - 16.0 g/dL    Hematocrit 44.1 36.0 - 46.0 %    MCV 85 80 - 100 fL    MCH 28.1 26.0 - 34.0 pg    MCHC 33.1 32.0 - 36.0 g/dL    RDW 13.7 11.5 - 14.5 %    Platelets 258 150 - 450 x10*3/uL    Neutrophils % 84.7 40.0 - 80.0 %    Immature Granulocytes %, Automated 0.3 0.0 - 0.9 %    Lymphocytes % 10.5 13.0 - 44.0 %    Monocytes % 4.4 2.0 - 10.0 %    Eosinophils % 0.0 0.0 - 6.0 %    Basophils % 0.1 0.0 - 2.0 %    Neutrophils Absolute 5.82 1.20 - 7.70 x10*3/uL    Immature Granulocytes Absolute, Automated 0.02 0.00 - 0.70 x10*3/uL    Lymphocytes Absolute 0.72 (L) 1.20 - 4.80 x10*3/uL    Monocytes Absolute 0.30 0.10 - 1.00 x10*3/uL    Eosinophils Absolute 0.00 0.00 - 0.70 x10*3/uL    Basophils Absolute 0.01 0.00 - 0.10 x10*3/uL   Magnesium   Result Value Ref Range    Magnesium 2.19 1.60 - 2.40 mg/dL   Comprehensive " metabolic panel   Result Value Ref Range    Glucose 90 74 - 99 mg/dL    Sodium 141 136 - 145 mmol/L    Potassium 3.7 3.5 - 5.3 mmol/L    Chloride 103 98 - 107 mmol/L    Bicarbonate 25 21 - 32 mmol/L    Anion Gap 17 10 - 20 mmol/L    Urea Nitrogen 8 6 - 23 mg/dL    Creatinine 0.81 0.50 - 1.05 mg/dL    eGFR >90 >60 mL/min/1.73m*2    Calcium 8.7 8.6 - 10.3 mg/dL    Albumin 4.4 3.4 - 5.0 g/dL    Alkaline Phosphatase 70 33 - 110 U/L    Total Protein 7.6 6.4 - 8.2 g/dL    AST 14 9 - 39 U/L    Bilirubin, Total 0.3 0.0 - 1.2 mg/dL    ALT 15 7 - 45 U/L   Lipase   Result Value Ref Range    Lipase 12 9 - 82 U/L   Lactate   Result Value Ref Range    Lactate 1.1 0.4 - 2.0 mmol/L   Troponin I, High Sensitivity   Result Value Ref Range    Troponin I, High Sensitivity <3 0 - 13 ng/L   Urinalysis with Reflex Culture and Microscopic   Result Value Ref Range    Color, Urine Straw Straw, Yellow    Appearance, Urine Clear Clear    Specific Gravity, Urine 1.006 1.005 - 1.035    pH, Urine 8.0 5.0, 5.5, 6.0, 6.5, 7.0, 7.5, 8.0    Protein, Urine NEGATIVE NEGATIVE mg/dL    Glucose, Urine NEGATIVE NEGATIVE mg/dL    Blood, Urine NEGATIVE NEGATIVE    Ketones, Urine NEGATIVE NEGATIVE mg/dL    Bilirubin, Urine NEGATIVE NEGATIVE    Urobilinogen, Urine <2.0 <2.0 mg/dL    Nitrite, Urine NEGATIVE NEGATIVE    Leukocyte Esterase, Urine NEGATIVE NEGATIVE   Extra Urine Gray Tube   Result Value Ref Range    Extra Tube Hold for add-ons.    hCG, Urine, Qualitative   Result Value Ref Range    HCG, Urine NEGATIVE NEGATIVE   ECG 12 lead   Result Value Ref Range    Ventricular Rate 82 BPM    Atrial Rate 82 BPM    CA Interval 112 ms    QRS Duration 74 ms    QT Interval 374 ms    QTC Calculation(Bazett) 436 ms    P Axis 69 degrees    R Axis 68 degrees    T Axis 48 degrees    QRS Count 13 beats    Q Onset 223 ms    P Onset 167 ms    P Offset 213 ms    T Offset 410 ms    QTC Fredericia 415 ms   Lactate   Result Value Ref Range    Lactate 1.1 0.4 - 2.0 mmol/L    Lavender Top   Result Value Ref Range    Extra Tube Hold for add-ons.    Basic metabolic panel   Result Value Ref Range    Glucose 113 (H) 74 - 99 mg/dL    Sodium 136 136 - 145 mmol/L    Potassium 4.0 3.5 - 5.3 mmol/L    Chloride 102 98 - 107 mmol/L    Bicarbonate 25 21 - 32 mmol/L    Anion Gap 13 10 - 20 mmol/L    Urea Nitrogen 8 6 - 23 mg/dL    Creatinine 0.68 0.50 - 1.05 mg/dL    eGFR >90 >60 mL/min/1.73m*2    Calcium 9.3 8.6 - 10.3 mg/dL      ECG 12 lead    Result Date: 4/1/2024  Normal sinus rhythm with sinus arrhythmia Normal ECG When compared with ECG of 24-NOV-2014 09:11, PREVIOUS ECG IS PRESENT    CT abdomen pelvis w IV contrast    Result Date: 3/31/2024  Interpreted By:  Dion Bowling, STUDY: CT ABDOMEN PELVIS W IV CONTRAST;  3/31/2024 10:43 pm   INDICATION: Signs/Symptoms:abd pain and distended abd. History of Lupus..   COMPARISON: 3/1/2024   ACCESSION NUMBER(S): [id number]   ORDERING CLINICIAN: AMBERLY ROMO   TECHNIQUE: Contiguous axial images of the abdomen and pelvis were obtained after the intravenous administration of  contrast. Coronal and sagittal reformatted images were obtained from the axial images.   FINDINGS: No basilar airspace disease. No pleural effusion.   No evidence of liver mass. The gallbladder is contracted and not well evaluated. No dilatation common bile duct.   The pancreas, spleen, and adrenal glands appear unremarkable.   Symmetric enhancement of the kidneys. No hydronephrosis.   No evidence of bowel obstruction or acute appendicitis.   Urinary bladder is underdistended and not well evaluated.   Limited evaluation of the uterus and adnexa. Minimal trace fluid in the pelvis.   No acute fracture of the lumbar spine.       No evidence of acute abnormality of the abdominal viscera.   No evidence of bowel obstruction or acute appendicitis.   MACRO: None   Signed by: Dion Bowling 3/31/2024 11:18 PM Dictation workstation:   [id number]    US PELVIS TRANSABDOMINAL WITH  TRANSVAGINAL    Result Date: 3/14/2024  STUDY: Pelvic Ultrasound; 3/14/2024 11:14 AM INDICATION: Pelvic pain, intermittently since 2/2024.  Additional History:  LMP 11/2023.  IUD placed 6/2023. COMPARISON: US Pelvis 2/29/2024, CT A/P 3/1/2024, 2/29/2024. ACCESSION NUMBER(S): [id number] ORDERING CLINICIAN: IVANA CASTILLO TECHNIQUE: Transabdominal and transvaginal ultrasonography of the pelvis was performed with spectral Doppler evaluation of the ovaries. FINDINGS: UTERUS:   The uterus is retroverted in position and measures 6.0 x 3.0 x 4.3 cm.  The uterus demonstrates a normal, homogeneous echotexture.  There are no discrete fibroids present.  ENDOMETRIUM: The endometrium measures 0.2 cm.  IUD is identified within the endometrium, questionably situated low in the uterus.  Trace free fluid is noted within the endometrium. RIGHT OVARY: The right ovary measures 4.0 x 1.7 x 2.8 cm.  The right ovary demonstrates a normal echotexture.  Spectral Doppler evaluation of the ovary was performed.  Normal color and spectral Doppler flow is visualized.  LEFT OVARY: The left ovary measures 3.3 x 1.5 x 2.0 cm.  The left ovary demonstrates a normal echotexture.  Spectral Doppler evaluation of the ovary was performed.  Normal color and spectral Doppler flow is visualized.   OTHER: No significant fluid is present within the cul-de-sac.    Unremarkable ultrasound of the pelvis. Normal color and spectral Doppler flow within both ovaries. IUD is identified within the endometrium, questionably situated low in the uterus. Signed by Triston Méndez MD       Assessment/Plan     24 year old female admitted with abdominal pain, bloating. H Pylori, celiac an SIBO testing negative on outpatient workup. Recent CT noted constipation. Will treat constipation and plan colonoscopy on Wednesday given chronicity of symptoms.    -OK for diet today  -CLD tomorrow  -Bisacodyl and Magnesium citrate today  -Recommend bowel regimen with Miralax 1-2 times per  day  -Monitor stool output  -Avoid dairy   -Would limit NSAIDs and Narcotics as able    GI will continue to follow, please contact with any questions.     Discussed with Dr Galdamez, who is in agreement.    Fani Rubio, CNP

## 2024-04-01 NOTE — H&P (VIEW-ONLY)
Reason For Consult  Abdominal pain, bloating     History Of Present Illness  Allyssa Kelly is a 24 y.o. female presenting with complaints of abdominal pain and bloating. Has had these symptoms intermittently for about the last year. Was seen in GI clinic, has been ruled out for SIBO, celiac and H Pylori. Tells me that she runs from diarrhea to regular bowel movements. Does note significant post prandial symptoms. Dairy does cause the diarrhea to be worse, avoids it mostly or takes lactaid pills. Over the past 2-3 days notes worsening bloating and abdominal pain. Pain is mostly lower. Nothing but pain medicine has seem to make it better. Eating may have made it worse. She did try an enema which she moved small amount of stool but mostly water. The pain was worse so she came in. Was also seen in the ED for this in early March, at that time a CT scan showed constipation, she did not feel constipated at that time. Does not take anything routinely for her bowels. She has been on OCP for years, recently switched to copper IUD which had to be removed and now back on OCP but no period since December. Also started on methotrexate aroun that time. No family hx of IBD. Paternal great grandmother with CRC. No prior Colonoscopy.      Past Medical History  She has a past medical history of Acute pharyngitis, unspecified (07/21/2014), Acute pharyngitis, unspecified (04/18/2017), Acute tonsillitis, unspecified (03/22/2021), Allergy, unspecified, initial encounter (10/08/2021), Anxiety, Arthralgia of temporomandibular joint, unspecified side (04/07/2014), Body mass index (BMI) 19.9 or less, adult (09/17/2021), Body mass index (BMI) pediatric, 5th percentile to less than 85th percentile for age (09/17/2021), Calculus of kidney with calculus of ureter (02/12/2022), Candidiasis of skin and nail (08/31/2020), Cellulitis of left toe (04/06/2018), Contact with and (suspected) exposure to other viral communicable diseases  (02/13/2020), COVID-19 (12/30/2020), Displaced fracture of shaft of third metacarpal bone, left hand, initial encounter for closed fracture (12/05/2016), Encounter for general adult medical examination without abnormal findings (09/17/2021), Encounter for pre-employment examination (09/17/2021), Encounter for routine child health examination without abnormal findings (10/03/2018), Hordeolum externum right upper eyelid (11/19/2021), Jaw pain (10/07/2020), Joint derangement, unspecified (10/15/2021), Local infection of the skin and subcutaneous tissue, unspecified (04/26/2022), Localized swelling, mass and lump, head (04/28/2017), Nonscarring hair loss, unspecified (10/03/2018), Other chronic diseases of tonsils and adenoids (04/28/2017), Other conditions influencing health status (11/18/2021), Other diseases of tongue (04/28/2017), Other local lupus erythematosus (10/19/2022), Other specified respiratory disorders (02/18/2019), Pain in left arm (11/22/2021), Pain in right ankle and joints of right foot (09/23/2021), Pain in right wrist (06/08/2016), Pain in unspecified knee (09/30/2014), Pain in unspecified shoulder (10/27/2014), Pain in unspecified wrist (11/22/2021), Panic attacks, Personal history of diseases of the skin and subcutaneous tissue (08/12/2017), Personal history of diseases of the skin and subcutaneous tissue (12/30/2020), Personal history of diseases of the skin and subcutaneous tissue (04/06/2015), Personal history of other (healed) physical injury and trauma (11/19/2018), Personal history of other diseases of the digestive system (02/13/2020), Personal history of other diseases of the female genital tract (08/12/2017), Personal history of other diseases of the musculoskeletal system and connective tissue (12/14/2017), Personal history of other diseases of the musculoskeletal system and connective tissue (05/10/2022), Personal history of other diseases of the respiratory system (11/19/2021),  Personal history of other diseases of the respiratory system (01/17/2017), Personal history of other diseases of the respiratory system (02/18/2019), Personal history of other diseases of the respiratory system (07/21/2014), Personal history of other diseases of the respiratory system (01/07/2015), Personal history of other diseases of the respiratory system (01/17/2017), Personal history of other diseases of urinary system (02/12/2022), Personal history of other infectious and parasitic diseases (07/21/2014), Personal history of other specified conditions (02/12/2022), Personal history of other specified conditions (02/12/2022), Personal history of other specified conditions (09/20/2021), Personal history of other specified conditions (10/15/2021), Personal history of other specified conditions (10/15/2021), Personal history of other specified conditions (04/05/2021), Personal history of other specified conditions (02/18/2019), Personal history of other specified conditions (03/15/2021), Personal history of other specified conditions (02/21/2020), Personal history of other specified conditions (12/01/2016), Personal history of other specified conditions (01/07/2015), Personal history of other specified conditions (01/07/2015), PONV (postoperative nausea and vomiting), Premenstrual dysphoric disorder (01/25/2016), Raised antibody titer (02/24/2020), Right lower quadrant pain (02/21/2020), Sprain of interphalangeal joint of right middle finger, initial encounter (03/06/2018), Strain of unspecified muscle(s) and tendon(s) at lower leg level, left leg, initial encounter (12/10/2018), Superficial mycosis, unspecified (10/17/2015), Tic disorder, unspecified (11/01/2021), TMJ (dislocation of temporomandibular joint), Unspecified abdominal pain (12/01/2016), Unspecified acute conjunctivitis, left eye (12/01/2016), Unspecified conjunctivitis (11/13/2015), Unspecified fracture of left wrist and hand, initial encounter for  closed fracture (12/29/2016), Unspecified injury of left wrist, hand and finger(s), initial encounter (12/03/2016), Unspecified injury of right lower leg, initial encounter (12/07/2015), Unspecified injury of right wrist, hand and finger(s), initial encounter (03/28/2017), Unspecified injury of right wrist, hand and finger(s), initial encounter (03/06/2018), Unspecified injury of unspecified foot, initial encounter (05/02/2015), Unspecified injury of unspecified wrist, hand and finger(s), initial encounter, Unspecified renal colic (02/12/2022), and Vomiting, unspecified (02/25/2020).    Surgical History  She has a past surgical history that includes Other surgical history (12/11/2020); Other surgical history (10/19/2020); Other surgical history (03/25/2022); Other surgical history (09/16/2020); and Other surgical history (05/07/2021).     Social History  She reports that she has never smoked. She has never used smokeless tobacco. She reports current alcohol use. She reports that she does not use drugs.    Family History  Family History   Problem Relation Name Age of Onset    Anxiety disorder Mother      Hodgkin's lymphoma Mother      Breast cancer Mother      Hypertension Father      Other (PATENT DUCTUS ARTERIOSUS) Father      Other (TIC DISORDER) Father      Lupus Father's Sister      Anxiety disorder Father's Brother      Anxiety disorder Maternal Grandmother      Anxiety disorder Maternal Grandfather      Lupus Paternal Grandmother      Hypertension Paternal Grandfather          Allergies  Tretinoin    Review of Systems  A12 point ROS negative, pertinent positives noted in HPI       Physical Exam  General: Alert and awake, NAD,   HENT: normocephalic, PERRL, anicteric  Skin: no jaundice, intact   RESP: Nonlabored on RA  CARD: RRR  GI: mild to moderately distended, diffuse abdominal pain,  Extremities: no edema  Neuro: A&Ox3, no asterixis  Psych: Calm and cooperative      Last Recorded Vitals  Blood pressure  "135/85, pulse 102, temperature 36.3 °C (97.3 °F), resp. rate 18, height 1.753 m (5' 9\"), weight 72.6 kg (160 lb), SpO2 97 %.    Relevant Results  Scheduled medications  bisacodyl, 10 mg, oral, Once  cloNIDine, 0.2 mg, oral, Nightly  dicyclomine, 10 mg, oral, 4x daily  FLUoxetine, 40 mg, oral, Daily  [START ON 4/2/2024] folic acid, 1 mg, oral, Once per day on Sun Tue Wed Thu Fri Sat  lactated Ringer's, 1,000 mL, intravenous, Once  magnesium citrate, 296 mL, oral, Once  methotrexate, 15 mg, oral, Weekly  mirtazapine, 7.5 mg, oral, Nightly      Continuous medications  lactated Ringer's, 100 mL/hr, Last Rate: 100 mL/hr (04/01/24 0213)      PRN medications  PRN medications: acetaminophen **OR** acetaminophen **OR** acetaminophen, HYDROmorphone, ketorolac, morphine, ondansetron **OR** ondansetron, polyethylene glycol, SUMAtriptan, traMADol    Results for orders placed or performed during the hospital encounter of 03/31/24 (from the past 24 hour(s))   CBC and Auto Differential   Result Value Ref Range    WBC 6.9 4.4 - 11.3 x10*3/uL    nRBC 0.0 0.0 - 0.0 /100 WBCs    RBC 5.19 4.00 - 5.20 x10*6/uL    Hemoglobin 14.6 12.0 - 16.0 g/dL    Hematocrit 44.1 36.0 - 46.0 %    MCV 85 80 - 100 fL    MCH 28.1 26.0 - 34.0 pg    MCHC 33.1 32.0 - 36.0 g/dL    RDW 13.7 11.5 - 14.5 %    Platelets 258 150 - 450 x10*3/uL    Neutrophils % 84.7 40.0 - 80.0 %    Immature Granulocytes %, Automated 0.3 0.0 - 0.9 %    Lymphocytes % 10.5 13.0 - 44.0 %    Monocytes % 4.4 2.0 - 10.0 %    Eosinophils % 0.0 0.0 - 6.0 %    Basophils % 0.1 0.0 - 2.0 %    Neutrophils Absolute 5.82 1.20 - 7.70 x10*3/uL    Immature Granulocytes Absolute, Automated 0.02 0.00 - 0.70 x10*3/uL    Lymphocytes Absolute 0.72 (L) 1.20 - 4.80 x10*3/uL    Monocytes Absolute 0.30 0.10 - 1.00 x10*3/uL    Eosinophils Absolute 0.00 0.00 - 0.70 x10*3/uL    Basophils Absolute 0.01 0.00 - 0.10 x10*3/uL   Magnesium   Result Value Ref Range    Magnesium 2.19 1.60 - 2.40 mg/dL   Comprehensive " metabolic panel   Result Value Ref Range    Glucose 90 74 - 99 mg/dL    Sodium 141 136 - 145 mmol/L    Potassium 3.7 3.5 - 5.3 mmol/L    Chloride 103 98 - 107 mmol/L    Bicarbonate 25 21 - 32 mmol/L    Anion Gap 17 10 - 20 mmol/L    Urea Nitrogen 8 6 - 23 mg/dL    Creatinine 0.81 0.50 - 1.05 mg/dL    eGFR >90 >60 mL/min/1.73m*2    Calcium 8.7 8.6 - 10.3 mg/dL    Albumin 4.4 3.4 - 5.0 g/dL    Alkaline Phosphatase 70 33 - 110 U/L    Total Protein 7.6 6.4 - 8.2 g/dL    AST 14 9 - 39 U/L    Bilirubin, Total 0.3 0.0 - 1.2 mg/dL    ALT 15 7 - 45 U/L   Lipase   Result Value Ref Range    Lipase 12 9 - 82 U/L   Lactate   Result Value Ref Range    Lactate 1.1 0.4 - 2.0 mmol/L   Troponin I, High Sensitivity   Result Value Ref Range    Troponin I, High Sensitivity <3 0 - 13 ng/L   Urinalysis with Reflex Culture and Microscopic   Result Value Ref Range    Color, Urine Straw Straw, Yellow    Appearance, Urine Clear Clear    Specific Gravity, Urine 1.006 1.005 - 1.035    pH, Urine 8.0 5.0, 5.5, 6.0, 6.5, 7.0, 7.5, 8.0    Protein, Urine NEGATIVE NEGATIVE mg/dL    Glucose, Urine NEGATIVE NEGATIVE mg/dL    Blood, Urine NEGATIVE NEGATIVE    Ketones, Urine NEGATIVE NEGATIVE mg/dL    Bilirubin, Urine NEGATIVE NEGATIVE    Urobilinogen, Urine <2.0 <2.0 mg/dL    Nitrite, Urine NEGATIVE NEGATIVE    Leukocyte Esterase, Urine NEGATIVE NEGATIVE   Extra Urine Gray Tube   Result Value Ref Range    Extra Tube Hold for add-ons.    hCG, Urine, Qualitative   Result Value Ref Range    HCG, Urine NEGATIVE NEGATIVE   ECG 12 lead   Result Value Ref Range    Ventricular Rate 82 BPM    Atrial Rate 82 BPM    KS Interval 112 ms    QRS Duration 74 ms    QT Interval 374 ms    QTC Calculation(Bazett) 436 ms    P Axis 69 degrees    R Axis 68 degrees    T Axis 48 degrees    QRS Count 13 beats    Q Onset 223 ms    P Onset 167 ms    P Offset 213 ms    T Offset 410 ms    QTC Fredericia 415 ms   Lactate   Result Value Ref Range    Lactate 1.1 0.4 - 2.0 mmol/L    Lavender Top   Result Value Ref Range    Extra Tube Hold for add-ons.    Basic metabolic panel   Result Value Ref Range    Glucose 113 (H) 74 - 99 mg/dL    Sodium 136 136 - 145 mmol/L    Potassium 4.0 3.5 - 5.3 mmol/L    Chloride 102 98 - 107 mmol/L    Bicarbonate 25 21 - 32 mmol/L    Anion Gap 13 10 - 20 mmol/L    Urea Nitrogen 8 6 - 23 mg/dL    Creatinine 0.68 0.50 - 1.05 mg/dL    eGFR >90 >60 mL/min/1.73m*2    Calcium 9.3 8.6 - 10.3 mg/dL      ECG 12 lead    Result Date: 4/1/2024  Normal sinus rhythm with sinus arrhythmia Normal ECG When compared with ECG of 24-NOV-2014 09:11, PREVIOUS ECG IS PRESENT    CT abdomen pelvis w IV contrast    Result Date: 3/31/2024  Interpreted By:  Dion Bowling, STUDY: CT ABDOMEN PELVIS W IV CONTRAST;  3/31/2024 10:43 pm   INDICATION: Signs/Symptoms:abd pain and distended abd. History of Lupus..   COMPARISON: 3/1/2024   ACCESSION NUMBER(S): [id number]   ORDERING CLINICIAN: AMBERLY ROMO   TECHNIQUE: Contiguous axial images of the abdomen and pelvis were obtained after the intravenous administration of  contrast. Coronal and sagittal reformatted images were obtained from the axial images.   FINDINGS: No basilar airspace disease. No pleural effusion.   No evidence of liver mass. The gallbladder is contracted and not well evaluated. No dilatation common bile duct.   The pancreas, spleen, and adrenal glands appear unremarkable.   Symmetric enhancement of the kidneys. No hydronephrosis.   No evidence of bowel obstruction or acute appendicitis.   Urinary bladder is underdistended and not well evaluated.   Limited evaluation of the uterus and adnexa. Minimal trace fluid in the pelvis.   No acute fracture of the lumbar spine.       No evidence of acute abnormality of the abdominal viscera.   No evidence of bowel obstruction or acute appendicitis.   MACRO: None   Signed by: Dion Bowling 3/31/2024 11:18 PM Dictation workstation:   [id number]    US PELVIS TRANSABDOMINAL WITH  TRANSVAGINAL    Result Date: 3/14/2024  STUDY: Pelvic Ultrasound; 3/14/2024 11:14 AM INDICATION: Pelvic pain, intermittently since 2/2024.  Additional History:  LMP 11/2023.  IUD placed 6/2023. COMPARISON: US Pelvis 2/29/2024, CT A/P 3/1/2024, 2/29/2024. ACCESSION NUMBER(S): [id number] ORDERING CLINICIAN: IVANA CASTILLO TECHNIQUE: Transabdominal and transvaginal ultrasonography of the pelvis was performed with spectral Doppler evaluation of the ovaries. FINDINGS: UTERUS:   The uterus is retroverted in position and measures 6.0 x 3.0 x 4.3 cm.  The uterus demonstrates a normal, homogeneous echotexture.  There are no discrete fibroids present.  ENDOMETRIUM: The endometrium measures 0.2 cm.  IUD is identified within the endometrium, questionably situated low in the uterus.  Trace free fluid is noted within the endometrium. RIGHT OVARY: The right ovary measures 4.0 x 1.7 x 2.8 cm.  The right ovary demonstrates a normal echotexture.  Spectral Doppler evaluation of the ovary was performed.  Normal color and spectral Doppler flow is visualized.  LEFT OVARY: The left ovary measures 3.3 x 1.5 x 2.0 cm.  The left ovary demonstrates a normal echotexture.  Spectral Doppler evaluation of the ovary was performed.  Normal color and spectral Doppler flow is visualized.   OTHER: No significant fluid is present within the cul-de-sac.    Unremarkable ultrasound of the pelvis. Normal color and spectral Doppler flow within both ovaries. IUD is identified within the endometrium, questionably situated low in the uterus. Signed by Triston Méndez MD       Assessment/Plan     24 year old female admitted with abdominal pain, bloating. H Pylori, celiac an SIBO testing negative on outpatient workup. Recent CT noted constipation. Will treat constipation and plan colonoscopy on Wednesday given chronicity of symptoms.    -OK for diet today  -CLD tomorrow  -Bisacodyl and Magnesium citrate today  -Recommend bowel regimen with Miralax 1-2 times per  day  -Monitor stool output  -Avoid dairy   -Would limit NSAIDs and Narcotics as able    GI will continue to follow, please contact with any questions.     Discussed with Dr Galdamez, who is in agreement.    Fani Rubio, CNP

## 2024-04-01 NOTE — ED PROVIDER NOTES
HPI   Chief Complaint   Patient presents with    Abdominal Pain     All 4 quadrants since yesterday, Endorses nausea, diarrhea, bloating. Denies vomiting, constipation, pregnancy. Denies any abdominal surgical hx       Allyssa is a 24-year-old female who presents with abdominal discomfort.  Started having some abdominal pain yesterday and got worse today.  Her mother notes that her abdomen was markedly distended yesterday and continues to be distended today.  Patient is unsure if food makes it better or worse.  Mother thinks that food makes it worse.  Patient has had some discomfort earlier in abdomen was seen in the emergency department at the end of February beginning of March and had a CT scan done at that time.  She seemed to get better with time and then developed migraine headaches that was intractable.  She was placed on oral steroids and is currently on a slow taper.  She started having discomfort yesterday and continues.  She is taken some Advil but not extensive amount.  She has been able to move her bowels and denies any urinary retention.  She denies any discharge from vagina she denies possibility of pregnancy no recent trauma.  She denies any fever chills cough or cold.  Pain is from patient and mom and EMR                          Yuri Coma Scale Score: 15                     Patient History   Past Medical History:   Diagnosis Date    Acute pharyngitis, unspecified 07/21/2014    Sore throat    Acute pharyngitis, unspecified 04/18/2017    Sore throat    Acute tonsillitis, unspecified 03/22/2021    Acute tonsillitis, unspecified    Allergy, unspecified, initial encounter 10/08/2021    Hypersensitivity    Anxiety     Arthralgia of temporomandibular joint, unspecified side 04/07/2014    Temporomandibular joint pain    Body mass index (BMI) 19.9 or less, adult 09/17/2021    Body mass index (BMI) of 19.9 or less in adult    Body mass index (BMI) pediatric, 5th percentile to less than 85th percentile for  age 09/17/2021    BMI (body mass index), pediatric, 5% to less than 85% for age    Calculus of kidney with calculus of ureter 02/12/2022    Calculus of kidney with calculus of ureter    Candidiasis of skin and nail 08/31/2020    Candidal skin infection    Cellulitis of left toe 04/06/2018    Cellulitis of fifth toe of left foot    Contact with and (suspected) exposure to other viral communicable diseases 02/13/2020    Exposure to influenza    COVID-19 12/30/2020    COVID-19 virus infection    Displaced fracture of shaft of third metacarpal bone, left hand, initial encounter for closed fracture 12/05/2016    Closed displaced fracture of shaft of third metacarpal bone of left hand, initial encounter    Encounter for general adult medical examination without abnormal findings 09/17/2021    Examination, routine, over 18 years of age    Encounter for pre-employment examination 09/17/2021    Encounter for pre-employment health screening examination    Encounter for routine child health examination without abnormal findings 10/03/2018    Encounter for routine child health examination without abnormal findings    Hordeolum externum right upper eyelid 11/19/2021    Hordeolum externum of right upper eyelid    Jaw pain 10/07/2020    Chronic jaw pain    Joint derangement, unspecified 10/15/2021    Hypermobility of joint    Local infection of the skin and subcutaneous tissue, unspecified 04/26/2022    Infection of skin of finger    Localized swelling, mass and lump, head 04/28/2017    Tongue swelling    Nonscarring hair loss, unspecified 10/03/2018    Hair thinning    Other chronic diseases of tonsils and adenoids 04/28/2017    Cryptic tonsil    Other conditions influencing health status 11/18/2021    Encounter for procedure    Other diseases of tongue 04/28/2017    Tongue lesion    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    Other specified respiratory disorders 02/18/2019    Viral respiratory  illness    Pain in left arm 11/22/2021    Pain of left upper extremity    Pain in right ankle and joints of right foot 09/23/2021    Right ankle pain    Pain in right wrist 06/08/2016    Right wrist pain    Pain in unspecified knee 09/30/2014    Joint pain, knee    Pain in unspecified shoulder 10/27/2014    Shoulder pain    Pain in unspecified wrist 11/22/2021    Chronic wrist pain    Panic attacks     Personal history of diseases of the skin and subcutaneous tissue 08/12/2017    History of telogen effluvium    Personal history of diseases of the skin and subcutaneous tissue 12/30/2020    History of pilonidal cyst    Personal history of diseases of the skin and subcutaneous tissue 04/06/2015    History of contact dermatitis    Personal history of other (healed) physical injury and trauma 11/19/2018    History of insect bite    Personal history of other diseases of the digestive system 02/13/2020    History of gastroenteritis    Personal history of other diseases of the female genital tract 08/12/2017    History of dysmenorrhea    Personal history of other diseases of the musculoskeletal system and connective tissue 12/14/2017    History of low back pain    Personal history of other diseases of the musculoskeletal system and connective tissue 05/10/2022    History of muscle pain    Personal history of other diseases of the respiratory system 11/19/2021    History of sore throat    Personal history of other diseases of the respiratory system 01/17/2017    History of acute sinusitis    Personal history of other diseases of the respiratory system 02/18/2019    History of sore throat    Personal history of other diseases of the respiratory system 07/21/2014    History of pharyngitis    Personal history of other diseases of the respiratory system 01/07/2015    History of influenza    Personal history of other diseases of the respiratory system 01/17/2017    History of acute sinusitis    Personal history of other diseases  of urinary system 02/12/2022    History of hematuria    Personal history of other infectious and parasitic diseases 07/21/2014    History of viral infection    Personal history of other specified conditions 02/12/2022    History of dysuria    Personal history of other specified conditions 02/12/2022    History of dysuria    Personal history of other specified conditions 09/20/2021    History of insomnia    Personal history of other specified conditions 10/15/2021    History of urinary urgency    Personal history of other specified conditions 10/15/2021    History of urinary frequency    Personal history of other specified conditions 04/05/2021    History of weight loss    Personal history of other specified conditions 02/18/2019    History of fever    Personal history of other specified conditions 03/15/2021    History of nasal congestion    Personal history of other specified conditions 02/21/2020    History of diarrhea    Personal history of other specified conditions 12/01/2016    History of abdominal pain    Personal history of other specified conditions 01/07/2015    History of vomiting    Personal history of other specified conditions 01/07/2015    History of fever    PONV (postoperative nausea and vomiting)     WEARS PATCH    Premenstrual dysphoric disorder 01/25/2016    PMDD (premenstrual dysphoric disorder)    Raised antibody titer 02/24/2020    Elevated EBV antibody titer    Right lower quadrant pain 02/21/2020    Bilateral lower abdominal pain    Sprain of interphalangeal joint of right middle finger, initial encounter 03/06/2018    Sprain of interphalangeal joint of right middle finger, initial encounter    Strain of unspecified muscle(s) and tendon(s) at lower leg level, left leg, initial encounter 12/10/2018    Strain of left knee    Superficial mycosis, unspecified 10/17/2015    Fungal rash of trunk    Tic disorder, unspecified 11/01/2021    Tic disorder    TMJ (dislocation of temporomandibular joint)      Unspecified abdominal pain 12/01/2016    Abdominal pain, acute    Unspecified acute conjunctivitis, left eye 12/01/2016    Acute conjunctivitis, left eye    Unspecified conjunctivitis 11/13/2015    Conjunctivitis, left eye    Unspecified fracture of left wrist and hand, initial encounter for closed fracture 12/29/2016    Fracture of left hand, closed, initial encounter    Unspecified injury of left wrist, hand and finger(s), initial encounter 12/03/2016    Injury of left hand, initial encounter    Unspecified injury of right lower leg, initial encounter 12/07/2015    Right knee injury    Unspecified injury of right wrist, hand and finger(s), initial encounter 03/28/2017    Injury of right hand, initial encounter    Unspecified injury of right wrist, hand and finger(s), initial encounter 03/06/2018    Injury of finger of right hand, initial encounter    Unspecified injury of unspecified foot, initial encounter 05/02/2015    Foot injury    Unspecified injury of unspecified wrist, hand and finger(s), initial encounter     Finger injury    Unspecified renal colic 02/12/2022    Ureteral colic    Vomiting, unspecified 02/25/2020    Intermittent vomiting     Past Surgical History:   Procedure Laterality Date    OTHER SURGICAL HISTORY  12/11/2020    Foot surgery    OTHER SURGICAL HISTORY  10/19/2020    Wrist surgery    OTHER SURGICAL HISTORY  03/25/2022    Temporomandibular joint surgery    OTHER SURGICAL HISTORY  09/16/2020    Surgery    OTHER SURGICAL HISTORY  05/07/2021    Arthrocentesis (Therapeutic)     Family History   Problem Relation Name Age of Onset    Anxiety disorder Mother      Hodgkin's lymphoma Mother      Breast cancer Mother      Hypertension Father      Other (PATENT DUCTUS ARTERIOSUS) Father      Other (TIC DISORDER) Father      Lupus Father's Sister      Anxiety disorder Father's Brother      Anxiety disorder Maternal Grandmother      Anxiety disorder Maternal Grandfather      Lupus Paternal  Grandmother      Hypertension Paternal Grandfather       Social History     Tobacco Use    Smoking status: Never    Smokeless tobacco: Never   Vaping Use    Vaping Use: Never used   Substance Use Topics    Alcohol use: Yes     Comment: social    Drug use: Never       Physical Exam   ED Triage Vitals [03/31/24 2004]   Temperature Heart Rate Respirations BP   36.9 °C (98.4 °F) 86 16 (!) 138/94      Pulse Ox Temp Source Heart Rate Source Patient Position   97 % Skin Monitor Sitting      BP Location FiO2 (%)     Left arm --       Physical Exam  Vitals reviewed.   Constitutional:       General: She is awake.      Comments: Appears uncomfortable   HENT:      Head: Normocephalic.      Nose: Nose normal.   Cardiovascular:      Rate and Rhythm: Normal rate and regular rhythm.   Pulmonary:      Effort: Pulmonary effort is normal.      Breath sounds: Normal breath sounds.   Abdominal:      Comments:  diffuse tenderness with some voluntary guarding.  Does appear slightly distended.  Rare bowel sound.   Musculoskeletal:      Cervical back: Normal range of motion.   Skin:     General: Skin is warm.      Capillary Refill: Capillary refill takes less than 2 seconds.   Neurological:      Mental Status: She is alert.         ED Course & MDM   ED Course as of 04/01/24 0344   Sun Mar 31, 2024   2221 Worked up in the ED and has encouraging labs he is currently on 40 mg of prednisone to treat her migraines and possible lupus flare.  She now has abdominal discomfort.  Her abdomen is distended.  Started hurting yesterday and she will be worked up for that as well.  I discussed risk benefits alternatives of repeat imaging since she has had CT scans previously of her belly.  In this case I think 1 is required.  I talked to the patient and her mom and we discussed risk benefits alternatives and shared decision making. [RZ]   2310 EKG done at 2104 interpreted by me shows normal sinus rhythm at 82 bpm some sinus arrhythmia no obvious ischemia  no STEMI [RZ]   Mon Apr 01, 2024   0343 Patient continues to have some discomfort.  She was given additional medications for pain.  Spoke to the patient about the CT findings and labs also spoke to mom at bedside.  There is no obvious cause of her symptoms.  She still uncomfortable will be hospitalized overnight for pain control.  I spoke to the hospitalist about it. [RZ]   0344 Unsure if the cause of her symptoms is lupus or not.  She was given some additional steroids. [RZ]      ED Course User Index  [RZ] Loyd Moralez MD         Diagnoses as of 04/01/24 0344   Generalized abdominal pain       Medical Decision Making      Procedure  Procedures     Loyd Moralez MD  04/01/24 0344

## 2024-04-01 NOTE — H&P
Internal Medicine:  Initial History and Physical Note     Patient: Allyssa Kelly, Age: 24 y.o., SEX: female, MRN:[id number], ROOM:[address] [place] [address], Code:Full Code                                                                 Admitted On: 3/31/2024                                                                    Admitting Dx: Abdominal pain [R10.9]                                                                            PCP: ZELDA Ortega-CNP                                                                   Attending: Casey Sauceda MD       Subjective:     Chief Complaint   Patient presents with    Abdominal Pain     All 4 quadrants since yesterday, Endorses nausea, diarrhea, bloating. Denies vomiting, constipation, pregnancy. Denies any abdominal surgical hx       HPI:   Allyssa Kelly is a 24 y.o. female with a PMHx of cutaneous lupus [on methotrexate], migraines, anxiety, anxiety induced tics [on clonidine] who presented for C/O abdominal pain and bloating X 1 day.    Patient states that her abdominal pain is 10/10, located all over her stomach, distended stomach, associated with abdominal cramping, diarrhea/mucous heavy stool and nausea.  She believes the diarrhea is from giving herself an enema at home.  She was having regular bowel movements but thought that maybe her bloating is related to constipation and hence decided to give herself an enema.    Patient states that she has anxiety but has not noticed if her symptoms are worse during stress. Mother is at bedside and believes that food makes the symptoms worse.  Patient frequently needs to have a bowel movement 30 minutes after eating any type of food but dairy causes the most severe symptoms. She has intermittent episodes of diarrhea interspersed with regular BM.  Symptoms are sometimes relieved after a bowel movement. She continues to have dairy products sometimes and takes lactate.     She denies blood in her stool, fevers, chills,  vomiting, constipation, dysuria, marijuana use.    Patient has been admitted to the ED 5 times in the past month for similar complaints.  She was in the ED recently for these symptoms and is currently on a steroid taper for these complaints.    She has seen multiple specialists over the years for similar symptoms: GI [2023]-colonoscopy was discussed but never done due to other medical problems the patient and mother were suffering from, Rheumatology [2024]-does not believe patient has systemic lupus and cutaneous lupus would not be causing the symptoms, OB/GYN [2023]-removed copper IUD in December 2023 as a probable cause of patient's symptoms but have but her symptoms have not since subsided.   She has been tested for thyroid disease, celiac, H. pylori, SIBO, systemic lupus-all have been negative.     Family hx: SLE    ED course:   Vitals: Temp 36.9, HR 86, RR 16, /94, SpO2 97% on room air    Labs:  CBC-WBC 6.9, Hb 14.6, platelets 258  CMP-, K3.7, , bicarb 25, BUN 8, CR 0.81 ALT 15, AST 14  Lipase 12    Imaging:  CT Abdo pelvis with IV contrast  Impression:     No evidence of acute abnormality of the abdominal viscera.      No evidence of bowel obstruction or acute appendicitis.     Intervention:  IV Solu-Medrol, IV Toradol, IV morphine    ROS: 12 points review of system is negative except as stated in the HPI above.      Past Medical History:     has a past medical history of Acute pharyngitis, unspecified (07/21/2014), Acute pharyngitis, unspecified (04/18/2017), Acute tonsillitis, unspecified (03/22/2021), Allergy, unspecified, initial encounter (10/08/2021), Anxiety, Arthralgia of temporomandibular joint, unspecified side (04/07/2014), Body mass index (BMI) 19.9 or less, adult (09/17/2021), Body mass index (BMI) pediatric, 5th percentile to less than 85th percentile for age (09/17/2021), Calculus of kidney with calculus of ureter (02/12/2022), Candidiasis of skin and nail (08/31/2020),  Cellulitis of left toe (04/06/2018), Contact with and (suspected) exposure to other viral communicable diseases (02/13/2020), COVID-19 (12/30/2020), Displaced fracture of shaft of third metacarpal bone, left hand, initial encounter for closed fracture (12/05/2016), Encounter for general adult medical examination without abnormal findings (09/17/2021), Encounter for pre-employment examination (09/17/2021), Encounter for routine child health examination without abnormal findings (10/03/2018), Hordeolum externum right upper eyelid (11/19/2021), Jaw pain (10/07/2020), Joint derangement, unspecified (10/15/2021), Local infection of the skin and subcutaneous tissue, unspecified (04/26/2022), Localized swelling, mass and lump, head (04/28/2017), Nonscarring hair loss, unspecified (10/03/2018), Other chronic diseases of tonsils and adenoids (04/28/2017), Other conditions influencing health status (11/18/2021), Other diseases of tongue (04/28/2017), Other local lupus erythematosus (10/19/2022), Other specified respiratory disorders (02/18/2019), Pain in left arm (11/22/2021), Pain in right ankle and joints of right foot (09/23/2021), Pain in right wrist (06/08/2016), Pain in unspecified knee (09/30/2014), Pain in unspecified shoulder (10/27/2014), Pain in unspecified wrist (11/22/2021), Panic attacks, Personal history of diseases of the skin and subcutaneous tissue (08/12/2017), Personal history of diseases of the skin and subcutaneous tissue (12/30/2020), Personal history of diseases of the skin and subcutaneous tissue (04/06/2015), Personal history of other (healed) physical injury and trauma (11/19/2018), Personal history of other diseases of the digestive system (02/13/2020), Personal history of other diseases of the female genital tract (08/12/2017), Personal history of other diseases of the musculoskeletal system and connective tissue (12/14/2017), Personal history of other diseases of the musculoskeletal system and  connective tissue (05/10/2022), Personal history of other diseases of the respiratory system (11/19/2021), Personal history of other diseases of the respiratory system (01/17/2017), Personal history of other diseases of the respiratory system (02/18/2019), Personal history of other diseases of the respiratory system (07/21/2014), Personal history of other diseases of the respiratory system (01/07/2015), Personal history of other diseases of the respiratory system (01/17/2017), Personal history of other diseases of urinary system (02/12/2022), Personal history of other infectious and parasitic diseases (07/21/2014), Personal history of other specified conditions (02/12/2022), Personal history of other specified conditions (02/12/2022), Personal history of other specified conditions (09/20/2021), Personal history of other specified conditions (10/15/2021), Personal history of other specified conditions (10/15/2021), Personal history of other specified conditions (04/05/2021), Personal history of other specified conditions (02/18/2019), Personal history of other specified conditions (03/15/2021), Personal history of other specified conditions (02/21/2020), Personal history of other specified conditions (12/01/2016), Personal history of other specified conditions (01/07/2015), Personal history of other specified conditions (01/07/2015), PONV (postoperative nausea and vomiting), Premenstrual dysphoric disorder (01/25/2016), Raised antibody titer (02/24/2020), Right lower quadrant pain (02/21/2020), Sprain of interphalangeal joint of right middle finger, initial encounter (03/06/2018), Strain of unspecified muscle(s) and tendon(s) at lower leg level, left leg, initial encounter (12/10/2018), Superficial mycosis, unspecified (10/17/2015), Tic disorder, unspecified (11/01/2021), TMJ (dislocation of temporomandibular joint), Unspecified abdominal pain (12/01/2016), Unspecified acute conjunctivitis, left eye (12/01/2016),  Unspecified conjunctivitis (11/13/2015), Unspecified fracture of left wrist and hand, initial encounter for closed fracture (12/29/2016), Unspecified injury of left wrist, hand and finger(s), initial encounter (12/03/2016), Unspecified injury of right lower leg, initial encounter (12/07/2015), Unspecified injury of right wrist, hand and finger(s), initial encounter (03/28/2017), Unspecified injury of right wrist, hand and finger(s), initial encounter (03/06/2018), Unspecified injury of unspecified foot, initial encounter (05/02/2015), Unspecified injury of unspecified wrist, hand and finger(s), initial encounter, Unspecified renal colic (02/12/2022), and Vomiting, unspecified (02/25/2020).    Allergies   Allergen Reactions    Tretinoin Rash        Meds:   Medications Prior to Admission   Medication Sig Dispense Refill Last Dose    ascorbic acid, vitamin C, 500 mg capsule Take 1 tablet by mouth once daily. TAKE PER DIRECTED       B complex-vitamin C-folic acid (Nephro-[facility]) 1- mg-mg-mcg tablet Take 1 tablet by mouth. TAKE PER DIRECTED       cholecalciferol (Vitamin D-3) 25 MCG (1000 UT) capsule Take 1 capsule (25 mcg) by mouth once daily. TAKE PER DIRECTED       cloNIDine ER (Kapvay) 0.1 mg tablet extended release 12 hr Take 2 tablets (0.2 mg) by mouth once daily at bedtime. 180 tablet 2     FLUoxetine (PROzac) 40 mg capsule TAKE 1 CAPSULE BY MOUTH ONCE DAILY 90 capsule 2     folic acid (Folvite) 1 mg tablet Take 1 tablet (1 mg) by mouth once daily. 6 DAYS PER WEEK. NOT TO TAKE ON DAY when takes methotrexate 90 tablet 3     HYDROcodone-acetaminophen (Norco) 5-325 mg tablet Take 1 tablet by mouth every 6 hours if needed for severe pain (7 - 10). (Patient not taking: Reported on 3/11/2024) 10 tablet 0     methotrexate (Trexall) 2.5 mg tablet Take 6 tablets (15 mg total) by mouth 1 (one) time per week. 72 tablet 0     mirtazapine (Remeron) 7.5 mg tablet TAKE 1 TABLET (7.5 MG) BY MOUTH ONCE DAILY AT BEDTIME. 90  tablet 2     MULTIVITAMIN-FERROUS FUMARATE-FOLIC ACID 9 MG-200 MCG TABLET, HALF TABLET, (Centrum) Take 1 half tablet by mouth once daily.       ondansetron ODT (Zofran-ODT) 4 mg disintegrating tablet Take 1 tablet (4 mg) by mouth every 8 hours if needed for nausea or vomiting. 20 tablet 0     predniSONE (Deltasone) 10 mg tablet Take 1 tablet (10 mg) by mouth once daily. Take 4 pills once a day for 3 days then 3 pills once a day for 3 days then 2 pills once a day for 3 days then one pill once a day for 3 days then STOP 30 tablet 0     rizatriptan (Maxalt) 10 mg tablet Take 1 tablet (10 mg) by mouth once daily as needed for migraine.          Past surgical history:      Past Surgical History:   Procedure Laterality Date    OTHER SURGICAL HISTORY  12/11/2020    Foot surgery    OTHER SURGICAL HISTORY  10/19/2020    Wrist surgery    OTHER SURGICAL HISTORY  03/25/2022    Temporomandibular joint surgery    OTHER SURGICAL HISTORY  09/16/2020    Surgery    OTHER SURGICAL HISTORY  05/07/2021    Arthrocentesis (Therapeutic)        Social history:      Social History     Socioeconomic History    Marital status: Single     Spouse name: None    Number of children: None    Years of education: None    Highest education level: None   Occupational History    None   Tobacco Use    Smoking status: Never    Smokeless tobacco: Never   Vaping Use    Vaping Use: Never used   Substance and Sexual Activity    Alcohol use: Yes     Comment: social    Drug use: Never    Sexual activity: Defer   Other Topics Concern    None   Social History Narrative    None     Social Determinants of Health     Financial Resource Strain: Low Risk  (3/2/2024)    Overall Financial Resource Strain (CARDIA)     Difficulty of Paying Living Expenses: Not very hard   Food Insecurity: Not on file   Transportation Needs: No Transportation Needs (3/2/2024)    PRAPARE - Transportation     Lack of Transportation (Medical): No     Lack of Transportation (Non-Medical): No  "  Physical Activity: Not on file   Stress: Not on file   Social Connections: Not on file   Intimate Partner Violence: Not on file   Housing Stability: Low Risk  (3/2/2024)    Housing Stability Vital Sign     Unable to Pay for Housing in the Last Year: No     Number of Places Lived in the Last Year: 1     Unstable Housing in the Last Year: No        Current medications:      Current Facility-Administered Medications:     cloNIDine (Catapres) tablet 0.2 mg, 0.2 mg, oral, Nightly, Ahila Colleen, DO    FLUoxetine (PROzac) capsule 40 mg, 40 mg, oral, Daily, Ahila Colleen, DO    folic acid (Folvite) tablet 1 mg, 1 mg, oral, Daily, Ahila Colleen, DO    methotrexate (Trexall) tablet 15 mg, 15 mg, oral, Weekly, Ahila Colleen, DO    mirtazapine (Remeron) tablet 7.5 mg, 7.5 mg, oral, Nightly, Ahila Colleen, DO    ondansetron ODT (Zofran-ODT) disintegrating tablet 4 mg, 4 mg, oral, q8h PRN, Ahila Colleen, DO    sodium chloride 0.9% infusion, 75 mL/hr, intravenous, Continuous, Casey Sauceda MD, Last Rate: 75 mL/hr at 04/01/24 0143, 75 mL/hr at 04/01/24 0143    SUMAtriptan (Imitrex) tablet 50 mg, 50 mg, oral, q6h PRN, Ahila Colleen, DO      Objective:    Vitals: Blood pressure (!) 151/93, pulse 80, temperature 36.3 °C (97.3 °F), temperature source Temporal, resp. rate 16, height 1.753 m (5' 9\"), weight 72.6 kg (160 lb), SpO2 97 %.   I/Os:   Intake/Output Summary (Last 24 hours) at 4/1/2024 0145  Last data filed at 4/1/2024 0111  Gross per 24 hour   Intake 1000 ml   Output --   Net 1000 ml       Physical Examination:  GE: lying comfortable in bed, in NAD  HEENT: AT/NC, no conjunctival pallor, anicteric sclera, moist mucous membrane  Neck; supple neck, no LAD/JVD  Chest: CTAB, no adventitious sounds heard, normal rise and fall of thoracic cavity during each respiratory cycle.  CVS: s1 and s2, no MGR, RRR  Abd: voluntary guarding, ? distension, diffuse tenderness,  +BS, no organomegaly,   Ext: no " pedal/edema/cyanosis  Neuro: Aox3, Cn 2-12 intact, sensation intact, Motor 5/5 globally  Psych: normal mood/affect.        Laboratory:    Laboratory finding:   Labs Reviewed   CBC WITH AUTO DIFFERENTIAL - Abnormal       Result Value    WBC 6.9      nRBC 0.0      RBC 5.19      Hemoglobin 14.6      Hematocrit 44.1      MCV 85      MCH 28.1      MCHC 33.1      RDW 13.7      Platelets 258      Neutrophils % 84.7      Immature Granulocytes %, Automated 0.3      Lymphocytes % 10.5      Monocytes % 4.4      Eosinophils % 0.0      Basophils % 0.1      Neutrophils Absolute 5.82      Immature Granulocytes Absolute, Automated 0.02      Lymphocytes Absolute 0.72 (*)     Monocytes Absolute 0.30      Eosinophils Absolute 0.00      Basophils Absolute 0.01     HCG, URINE, QUALITATIVE - Normal    HCG, Urine NEGATIVE     MAGNESIUM - Normal    Magnesium 2.19     COMPREHENSIVE METABOLIC PANEL - Normal    Glucose 90      Sodium 141      Potassium 3.7      Chloride 103      Bicarbonate 25      Anion Gap 17      Urea Nitrogen 8      Creatinine 0.81      eGFR >90      Calcium 8.7      Albumin 4.4      Alkaline Phosphatase 70      Total Protein 7.6      AST 14      Bilirubin, Total 0.3      ALT 15     LIPASE - Normal    Lipase 12      Narrative:     Venipuncture immediately after or during the administration of Metamizole may lead to falsely low results. Testing should be performed immediately prior to Metamizole dosing.   LACTATE - Normal    Lactate 1.1      Narrative:     Venipuncture immediately after or during the administration of Metamizole may lead to falsely low results. Testing should be performed immediately                  prior to Metamizole dosing.   TROPONIN I, HIGH SENSITIVITY - Normal    Troponin I, High Sensitivity <3      Narrative:     Less than 99th percentile of normal range cutoff-                  Female and children under 18 years old <14 ng/L; Male <21 ng/L: Negative                  Repeat testing should be  performed if clinically indicated.                                     Female and children under 18 years old 14-50 ng/L; Male 21-50 ng/L:                  Consistent with possible cardiac damage and possible increased clinical                   risk. Serial measurements may help to assess extent of myocardial damage.                                     >50 ng/L: Consistent with cardiac damage, increased clinical risk and                  myocardial infarction. Serial measurements may help assess extent of                   myocardial damage.                                      NOTE: Children less than 1 year old may have higher baseline troponin                   levels and results should be interpreted in conjunction with the overall                   clinical context.                                     NOTE: Troponin I testing is performed using a different                   testing methodology at [facility] than at other                   [facility]. Direct result comparisons should only                   be made within the same method.   URINALYSIS WITH REFLEX CULTURE AND MICROSCOPIC - Normal    Color, Urine Straw      Appearance, Urine Clear      Specific Gravity, Urine 1.006      pH, Urine 8.0      Protein, Urine NEGATIVE      Glucose, Urine NEGATIVE      Blood, Urine NEGATIVE      Ketones, Urine NEGATIVE      Bilirubin, Urine NEGATIVE      Urobilinogen, Urine <2.0      Nitrite, Urine NEGATIVE      Leukocyte Esterase, Urine NEGATIVE     URINALYSIS WITH REFLEX CULTURE AND MICROSCOPIC    Narrative:     The following orders were created for panel order Urinalysis with Reflex Culture and Microscopic.                  Procedure                               Abnormality         Status                                     ---------                               -----------         ------                                     Urinalysis with Reflex C...[[id number]]  Normal              Final result   "                             Extra Urine Gray Tube[[id number]]                            In process                                                   Please view results for these tests on the individual orders.   EXTRA URINE GRAY TUBE       Imaging:   CT abdomen pelvis w IV contrast  Narrative: Interpreted By:  Dion Bowling,   STUDY:  CT ABDOMEN PELVIS W IV CONTRAST;  3/31/2024 10:43 pm      INDICATION:  Signs/Symptoms:abd pain and distended abd. History of Lupus..      COMPARISON:  3/1/2024      ACCESSION NUMBER(S):  [id number]      ORDERING CLINICIAN:  AMBERLY ROMO      TECHNIQUE:  Contiguous axial images of the abdomen and pelvis were obtained after  the intravenous administration of  contrast. Coronal and sagittal  reformatted images were obtained from the axial images.      FINDINGS:  No basilar airspace disease.  No pleural effusion.      No evidence of liver mass. The gallbladder is contracted and not well  evaluated. No dilatation common bile duct.      The pancreas, spleen, and adrenal glands appear unremarkable.      Symmetric enhancement of the kidneys.  No hydronephrosis.      No evidence of bowel obstruction or acute appendicitis.      Urinary bladder is underdistended and not well evaluated.      Limited evaluation of the uterus and adnexa.  Minimal trace fluid in the pelvis.      No acute fracture of the lumbar spine.      Impression: No evidence of acute abnormality of the abdominal viscera.      No evidence of bowel obstruction or acute appendicitis.      MACRO:  None      Signed by: Dion Bowling 3/31/2024 11:18 PM  Dictation workstation:   [id number]      Microbiology:   No results found for the last 90 days.    Lab Results   Component Value Date    URINECULTURE No growth 03/01/2024                    No lab exists for component: \"AGALPCRNB\"     Assessment and Plan:    Problem list:   Principal Problem:    Abdominal pain      Assessment and Plan:   Allyssa Kelly is a 24 y.o. female " with a PMHx of cutaneous lupus [on methotrexate], migraines, anxiety, anxiety induced tics [on clonidine] who presented for C/O abdominal pain and bloating X 1 day. Pt has been in the ED 5 times in the past month for similar complaints and has seen multiple specialists over the years. She has been tested for thyroid disease, SIBO, H.pylori, celiac-all have been negative. She does endorse lactose intolerance but continues to include lactose in her diet while taking lactate.  Pt has never been dx with IBS but given the chronicity of symptoms, anxiety, intermittent episodes of diarrhea and negative tests for other common diseases, there is high suspicion.     Active Issues:   #Abdominal pain likely 2/2 IBS vs lactose intolerance  Low likelihood of lupus flare [pt only has cutaneous lupus], infectious/viral cause [no fevers/vomiting, chronic symptoms], IBD [no blood in stool]    Plan:  -Bentyl  -pain control  -iv fluids  -needs to adhere to strict lactose free diet.  -GI consult for to evaluate for necessity of colonoscopy/endoscopy  -steroids don't have a role in IBS and pt continued to have symptoms while on them at home. Not continued.     Chronic Issues:   #anxiety:c/w fluoxetine 40mg daily, mirtazapine 7.5 mg  #Tics:c/w clonidine 0.2 mg daily  #cutaneous lupus: c/w methotrexate    F: IV LR  E: Replete as needed  N: lactose restricted diet  G: None  VTE: ambulate    Code:   Full Code    Disposition: 24 y.o.female admitted for abdominal pain likely 2/2 IBS being managed with symptom control and GI evaluation, will anticipate <48hrs eLOS.     Mckenna Mandel MD   IM Senior Resident               Burow's Advancement Flap Text: The defect edges were debeveled with a #15 scalpel blade.  Given the location of the defect and the proximity to free margins a Burow's advancement flap was deemed most appropriate.  Using a sterile surgical marker, the appropriate advancement flap was drawn incorporating the defect and placing the expected incisions within the relaxed skin tension lines where possible.    The area thus outlined was incised deep to adipose tissue with a #15 scalpel blade.  The skin margins were undermined to an appropriate distance in all directions utilizing iris scissors.

## 2024-04-01 NOTE — CARE PLAN
The clinical goals for the shift include pt pain  would be controlled evidence of 5/10 pain this shift.  Progressing.  Pt pain has been difficult to control. Pt sleeping at this time. Vitals have remained stable.

## 2024-04-01 NOTE — PROGRESS NOTES
04/01/24 1542   Discharge Planning   Living Arrangements Spouse/significant other   Support Systems Spouse/significant other;Parent   Assistance Needed Alert and oriented x 3, Independent with ADL's, Drives, No DME, Student, and is employed   Type of Residence Private residence   Do you have animals or pets at home? Yes   Type of Animals or Pets 1 dog   Who is requesting discharge planning? Provider   Home or Post Acute Services None   Patient expects to be discharged to: Home with no discharge needs identified   Does the patient need discharge transport arranged? No   Financial Resource Strain   How hard is it for you to pay for the very basics like food, housing, medical care, and heating? Not hard   Housing Stability   In the last 12 months, was there a time when you were not able to pay the mortgage or rent on time? N   In the last 12 months, how many places have you lived? 1   In the last 12 months, was there a time when you did not have a steady place to sleep or slept in a shelter (including now)? N   Transportation Needs   In the past 12 months, has lack of transportation kept you from medical appointments or from getting medications? no   In the past 12 months, has lack of transportation kept you from meetings, work, or from getting things needed for daily living? No   Patient Choice   Provider Choice list and CMS website (https://medicare.gov/care-compare#search) for post-acute Quality and Resource Measure Data were provided and reviewed with: Patient   Patient / Family choosing to utilize agency / facility established prior to hospitalization No

## 2024-04-01 NOTE — ED TRIAGE NOTES
Patient here for abdominal pain All 4 quadrants since yesterday, Endorses nausea, diarrhea, bloating. Denies vomiting, constipation, pregnancy. Denies any abdominal surgical hx

## 2024-04-02 ENCOUNTER — DOCUMENTATION (OUTPATIENT)
Dept: CARE COORDINATION | Facility: CLINIC | Age: 24
End: 2024-04-02
Payer: COMMERCIAL

## 2024-04-02 LAB
ATRIAL RATE: 82 BPM
P AXIS: 69 DEGREES
P OFFSET: 213 MS
P ONSET: 167 MS
PR INTERVAL: 112 MS
Q ONSET: 223 MS
QRS COUNT: 13 BEATS
QRS DURATION: 74 MS
QT INTERVAL: 374 MS
QTC CALCULATION(BAZETT): 436 MS
QTC FREDERICIA: 415 MS
R AXIS: 68 DEGREES
T AXIS: 48 DEGREES
T OFFSET: 410 MS
VENTRICULAR RATE: 82 BPM

## 2024-04-02 PROCEDURE — 2500000001 HC RX 250 WO HCPCS SELF ADMINISTERED DRUGS (ALT 637 FOR MEDICARE OP)

## 2024-04-02 PROCEDURE — 99232 SBSQ HOSP IP/OBS MODERATE 35: CPT | Performed by: FAMILY MEDICINE

## 2024-04-02 PROCEDURE — 2500000004 HC RX 250 GENERAL PHARMACY W/ HCPCS (ALT 636 FOR OP/ED): Performed by: FAMILY MEDICINE

## 2024-04-02 PROCEDURE — 94760 N-INVAS EAR/PLS OXIMETRY 1: CPT

## 2024-04-02 PROCEDURE — 99232 SBSQ HOSP IP/OBS MODERATE 35: CPT | Performed by: NURSE PRACTITIONER

## 2024-04-02 PROCEDURE — 2500000001 HC RX 250 WO HCPCS SELF ADMINISTERED DRUGS (ALT 637 FOR MEDICARE OP): Performed by: INTERNAL MEDICINE

## 2024-04-02 PROCEDURE — 2500000001 HC RX 250 WO HCPCS SELF ADMINISTERED DRUGS (ALT 637 FOR MEDICARE OP): Performed by: NURSE PRACTITIONER

## 2024-04-02 PROCEDURE — G0378 HOSPITAL OBSERVATION PER HR: HCPCS

## 2024-04-02 PROCEDURE — 2500000004 HC RX 250 GENERAL PHARMACY W/ HCPCS (ALT 636 FOR OP/ED)

## 2024-04-02 RX ORDER — MORPHINE SULFATE 4 MG/ML
4 INJECTION INTRAVENOUS EVERY 4 HOURS PRN
Status: DISCONTINUED | OUTPATIENT
Start: 2024-04-02 | End: 2024-04-02

## 2024-04-02 RX ORDER — POLYETHYLENE GLYCOL 3350, SODIUM CHLORIDE, SODIUM BICARBONATE, POTASSIUM CHLORIDE 420; 11.2; 5.72; 1.48 G/4L; G/4L; G/4L; G/4L
4000 POWDER, FOR SOLUTION ORAL ONCE
Status: COMPLETED | OUTPATIENT
Start: 2024-04-02 | End: 2024-04-02

## 2024-04-02 RX ORDER — MORPHINE SULFATE 2 MG/ML
2 INJECTION, SOLUTION INTRAMUSCULAR; INTRAVENOUS EVERY 4 HOURS PRN
Status: DISCONTINUED | OUTPATIENT
Start: 2024-04-02 | End: 2024-04-02

## 2024-04-02 RX ORDER — HYDROMORPHONE HYDROCHLORIDE 1 MG/ML
1 INJECTION, SOLUTION INTRAMUSCULAR; INTRAVENOUS; SUBCUTANEOUS EVERY 4 HOURS PRN
Status: DISCONTINUED | OUTPATIENT
Start: 2024-04-02 | End: 2024-04-05 | Stop reason: HOSPADM

## 2024-04-02 RX ORDER — HYDROMORPHONE HYDROCHLORIDE 1 MG/ML
2 INJECTION, SOLUTION INTRAMUSCULAR; INTRAVENOUS; SUBCUTANEOUS EVERY 4 HOURS PRN
Status: DISCONTINUED | OUTPATIENT
Start: 2024-04-02 | End: 2024-04-05 | Stop reason: HOSPADM

## 2024-04-02 RX ORDER — BISACODYL 5 MG
10 TABLET, DELAYED RELEASE (ENTERIC COATED) ORAL ONCE
Status: COMPLETED | OUTPATIENT
Start: 2024-04-02 | End: 2024-04-02

## 2024-04-02 RX ADMIN — MORPHINE SULFATE 4 MG: 4 INJECTION INTRAVENOUS at 08:11

## 2024-04-02 RX ADMIN — HYDROMORPHONE HYDROCHLORIDE 0.4 MG: 1 INJECTION, SOLUTION INTRAMUSCULAR; INTRAVENOUS; SUBCUTANEOUS at 06:06

## 2024-04-02 RX ADMIN — TRAMADOL HYDROCHLORIDE 50 MG: 50 TABLET, COATED ORAL at 10:58

## 2024-04-02 RX ADMIN — KETOROLAC TROMETHAMINE 30 MG: 30 INJECTION INTRAMUSCULAR; INTRAVENOUS at 14:46

## 2024-04-02 RX ADMIN — DICYCLOMINE HYDROCHLORIDE 10 MG: 10 CAPSULE ORAL at 16:40

## 2024-04-02 RX ADMIN — ONDANSETRON 4 MG: 2 INJECTION INTRAMUSCULAR; INTRAVENOUS at 18:43

## 2024-04-02 RX ADMIN — DROSPIRENONE 1 TABLET: 4 TABLET, FILM COATED ORAL at 23:34

## 2024-04-02 RX ADMIN — CLONIDINE HYDROCHLORIDE 0.2 MG: 0.1 TABLET ORAL at 21:35

## 2024-04-02 RX ADMIN — BISACODYL 10 MG: 5 TABLET, COATED ORAL at 16:40

## 2024-04-02 RX ADMIN — TRAMADOL HYDROCHLORIDE 50 MG: 50 TABLET, COATED ORAL at 02:58

## 2024-04-02 RX ADMIN — POLYETHYLENE GLYCOL 3350, SODIUM SULFATE ANHYDROUS, SODIUM BICARBONATE, SODIUM CHLORIDE, POTASSIUM CHLORIDE 4000 ML: 236; 22.74; 6.74; 5.86; 2.97 POWDER, FOR SOLUTION ORAL at 16:39

## 2024-04-02 RX ADMIN — ACETAMINOPHEN 650 MG: 325 TABLET ORAL at 18:07

## 2024-04-02 RX ADMIN — HYDROMORPHONE HYDROCHLORIDE 2 MG: 1 INJECTION, SOLUTION INTRAMUSCULAR; INTRAVENOUS; SUBCUTANEOUS at 23:34

## 2024-04-02 RX ADMIN — DICYCLOMINE HYDROCHLORIDE 10 MG: 10 CAPSULE ORAL at 10:58

## 2024-04-02 RX ADMIN — FLUOXETINE HYDROCHLORIDE 40 MG: 20 CAPSULE ORAL at 08:11

## 2024-04-02 RX ADMIN — FOLIC ACID 1 MG: 1 TABLET ORAL at 08:11

## 2024-04-02 RX ADMIN — MIRTAZAPINE 7.5 MG: 15 TABLET, FILM COATED ORAL at 21:35

## 2024-04-02 ASSESSMENT — COGNITIVE AND FUNCTIONAL STATUS - GENERAL
MOBILITY SCORE: 24
DAILY ACTIVITIY SCORE: 24

## 2024-04-02 ASSESSMENT — PAIN - FUNCTIONAL ASSESSMENT
PAIN_FUNCTIONAL_ASSESSMENT: 0-10

## 2024-04-02 ASSESSMENT — PAIN DESCRIPTION - LOCATION
LOCATION: ABDOMEN

## 2024-04-02 ASSESSMENT — PAIN SCALES - GENERAL
PAINLEVEL_OUTOF10: 8
PAINLEVEL_OUTOF10: 7
PAINLEVEL_OUTOF10: 5 - MODERATE PAIN
PAINLEVEL_OUTOF10: 7
PAINLEVEL_OUTOF10: 7
PAINLEVEL_OUTOF10: 4
PAINLEVEL_OUTOF10: 6
PAINLEVEL_OUTOF10: 8

## 2024-04-02 NOTE — PROGRESS NOTES
Brief chart review completed.  Allyssa remains at [facility] w plan for colonoscopy on 4.3.24.  Will follow up with her once she is discharged from the hospital.

## 2024-04-02 NOTE — PROGRESS NOTES
Allyssa Kelly is a 24 y.o. female on day 0 of admission presenting with Abdominal pain.      Subjective   Patient has continued abdominal pain       Objective     Last Recorded Vitals  /58 (BP Location: Right arm, Patient Position: Lying)   Pulse 67   Temp 36.1 °C (97 °F) (Temporal)   Resp 16   Wt 72.6 kg (160 lb)   SpO2 98%   Intake/Output last 3 Shifts:    Intake/Output Summary (Last 24 hours) at 4/2/2024 1344  Last data filed at 4/1/2024 1500  Gross per 24 hour   Intake 880 ml   Output --   Net 880 ml       Admission Weight  Weight: 72.6 kg (160 lb) (03/31/24 2004)    Daily Weight  03/31/24 : 72.6 kg (160 lb)    Image Results  ECG 12 lead  Normal sinus rhythm with sinus arrhythmia  Normal ECG  When compared with ECG of 24-NOV-2014 09:11,  PREVIOUS ECG IS PRESENT      Physical Exam  Gen: lying comfortably in bed, not in acute distress  HEENT: atraumatic, normocephalic  Pulm: normal respiratory effort, clear to auscultation b/l  Cardiac: RRR, no murmurs noted, normal S1/S2  GI: NABS, tender to palpation  MSK: normal ROM without joint swelling  Extremities: no LE edema, cyanosis  Neuro: AOX3, CN II-XII grossly intact, equal b/l strength, no loss in sensation   Psych: calm and appropriate for situation          Assessment/Plan    This is a 24-year-old female with a history of cutaneous lupus, migraines, anxiety who presents to the emergency room due to recurrent abdominal pain and bloating.    Abdominal pain  Previously suspected to be related to lupus.  Symptoms improved with steroid quickly returned about a week after steroid taper was complete  GI following  Suspects IBS-C, advised for clear liquid diet, n.p.o. at midnight and bowel prep orders  Will continue Dilaudid as needed, attempting to limit Dilaudid and other narcotics with concerns of further constipation  GI considering colonoscopy in a.m.  Considering  lactulose intolerance    Anxiety  Home medications continued    Cutaneous  lupus  Methotrexate continued    DVT prophylaxis  Patient is a low risk      Piter Padilla DO

## 2024-04-02 NOTE — PROGRESS NOTES
"Allyssa Kelly is a 24 y.o. female on day 0 of admission presenting with Abdominal pain.    Subjective   Ongoing abdominal pain, had a BM this am, moderate amount but all loose        Objective     Physical Exam  General: Alert and awake, NAD  HENT: normocephalic, PERRL, anicteric  Skin: no jaundice, intact   RESP: Nonlabored on RA  CARD: RRR  GI: soft, moderately distended, diffuse tenderness with no guarding   Extremities: no edema  Neuro: A&Ox3, no asterixis  Psych: Calm and cooperative   Last Recorded Vitals  Blood pressure 103/58, pulse 67, temperature 36.1 °C (97 °F), temperature source Temporal, resp. rate 16, height 1.753 m (5' 9\"), weight 72.6 kg (160 lb), SpO2 98 %.  Intake/Output last 3 Shifts:  I/O last 3 completed shifts:  In: 2441.3 (33.6 mL/kg) [P.O.:1420; I.V.:21.3 (0.3 mL/kg); IV Piggyback:1000]  Out: - (0 mL/kg)   Weight: 72.6 kg     Relevant Results  Scheduled medications  cloNIDine, 0.2 mg, oral, Nightly  dicyclomine, 10 mg, oral, 4x daily  drospirenone (contraceptive), 4 mg, oral, Daily  FLUoxetine, 40 mg, oral, Daily  folic acid, 1 mg, oral, Once per day on Sun Tue Wed Thu Fri Sat  lactated Ringer's, 1,000 mL, intravenous, Once  methotrexate, 15 mg, oral, Weekly  mirtazapine, 7.5 mg, oral, Nightly      Continuous medications     PRN medications  PRN medications: acetaminophen **OR** acetaminophen **OR** acetaminophen, HYDROmorphone, ketorolac, morphine, ondansetron **OR** ondansetron, polyethylene glycol, SUMAtriptan, traMADol      ECG 12 lead    Result Date: 4/1/2024  Normal sinus rhythm with sinus arrhythmia Normal ECG When compared with ECG of 24-NOV-2014 09:11, PREVIOUS ECG IS PRESENT    CT abdomen pelvis w IV contrast    Result Date: 3/31/2024  Interpreted By:  Dion Bowling, STUDY: CT ABDOMEN PELVIS W IV CONTRAST;  3/31/2024 10:43 pm   INDICATION: Signs/Symptoms:abd pain and distended abd. History of Lupus..   COMPARISON: 3/1/2024   ACCESSION NUMBER(S): [id number]   ORDERING " CLINICIAN: AMBERLY ROMO   TECHNIQUE: Contiguous axial images of the abdomen and pelvis were obtained after the intravenous administration of  contrast. Coronal and sagittal reformatted images were obtained from the axial images.   FINDINGS: No basilar airspace disease. No pleural effusion.   No evidence of liver mass. The gallbladder is contracted and not well evaluated. No dilatation common bile duct.   The pancreas, spleen, and adrenal glands appear unremarkable.   Symmetric enhancement of the kidneys. No hydronephrosis.   No evidence of bowel obstruction or acute appendicitis.   Urinary bladder is underdistended and not well evaluated.   Limited evaluation of the uterus and adnexa. Minimal trace fluid in the pelvis.   No acute fracture of the lumbar spine.       No evidence of acute abnormality of the abdominal viscera.   No evidence of bowel obstruction or acute appendicitis.   MACRO: None   Signed by: Dion Bowling 3/31/2024 11:18 PM Dictation workstation:   [id number]    US PELVIS TRANSABDOMINAL WITH TRANSVAGINAL    Result Date: 3/14/2024  STUDY: Pelvic Ultrasound; 3/14/2024 11:14 AM INDICATION: Pelvic pain, intermittently since 2/2024.  Additional History:  LMP 11/2023.  IUD placed 6/2023. COMPARISON: US Pelvis 2/29/2024, CT A/P 3/1/2024, 2/29/2024. ACCESSION NUMBER(S): [id number] ORDERING CLINICIAN: IVANA CASTILLO TECHNIQUE: Transabdominal and transvaginal ultrasonography of the pelvis was performed with spectral Doppler evaluation of the ovaries. FINDINGS: UTERUS:   The uterus is retroverted in position and measures 6.0 x 3.0 x 4.3 cm.  The uterus demonstrates a normal, homogeneous echotexture.  There are no discrete fibroids present.  ENDOMETRIUM: The endometrium measures 0.2 cm.  IUD is identified within the endometrium, questionably situated low in the uterus.  Trace free fluid is noted within the endometrium. RIGHT OVARY: The right ovary measures 4.0 x 1.7 x 2.8 cm.  The right ovary demonstrates  a normal echotexture.  Spectral Doppler evaluation of the ovary was performed.  Normal color and spectral Doppler flow is visualized.  LEFT OVARY: The left ovary measures 3.3 x 1.5 x 2.0 cm.  The left ovary demonstrates a normal echotexture.  Spectral Doppler evaluation of the ovary was performed.  Normal color and spectral Doppler flow is visualized.   OTHER: No significant fluid is present within the cul-de-sac.    Unremarkable ultrasound of the pelvis. Normal color and spectral Doppler flow within both ovaries. IUD is identified within the endometrium, questionably situated low in the uterus. Signed by Triston Méndez MD    * Cannot find OR log *  Last relevant procedure:                          Assessment/Plan   Principal Problem:    Abdominal pain      24 year old female admitted with abdominal pain, bloating. H Pylori, celiac an SIBO testing negative on outpatient workup. Recent CT noted constipation. Will treat constipation and plan colonoscopy tomorrow given chronicity of symptoms.     -CLD  -NPO at midnight  -Bowel prep ordered  -Recommend bowel regimen with Miralax 1-2 times per day  -Monitor stool output  -Avoid dairy   -Would limit NSAIDs and Narcotics as able     GI will continue to follow, please contact with any questions.      Discussed with Dr Galdamez, who is in agreement.     Fani Rubio, CNP

## 2024-04-03 ENCOUNTER — ANESTHESIA (OUTPATIENT)
Dept: OPERATING ROOM | Facility: HOSPITAL | Age: 24
DRG: 392 | End: 2024-04-03
Payer: COMMERCIAL

## 2024-04-03 ENCOUNTER — ANESTHESIA EVENT (OUTPATIENT)
Dept: OPERATING ROOM | Facility: HOSPITAL | Age: 24
DRG: 392 | End: 2024-04-03
Payer: COMMERCIAL

## 2024-04-03 ENCOUNTER — APPOINTMENT (OUTPATIENT)
Dept: OPERATING ROOM | Facility: HOSPITAL | Age: 24
DRG: 392 | End: 2024-04-03
Payer: COMMERCIAL

## 2024-04-03 PROBLEM — R10.84 GENERALIZED ABDOMINAL PAIN: Status: ACTIVE | Noted: 2024-04-03

## 2024-04-03 LAB
CRP SERPL-MCNC: 0.12 MG/DL
FERRITIN SERPL-MCNC: 31 NG/ML (ref 8–150)

## 2024-04-03 PROCEDURE — 45380 COLONOSCOPY AND BIOPSY: CPT | Performed by: INTERNAL MEDICINE

## 2024-04-03 PROCEDURE — 88305 TISSUE EXAM BY PATHOLOGIST: CPT | Mod: TC,59 | Performed by: INTERNAL MEDICINE

## 2024-04-03 PROCEDURE — A45380 PR COLONOSCOPY,BIOPSY: Performed by: NURSE ANESTHETIST, CERTIFIED REGISTERED

## 2024-04-03 PROCEDURE — 1100000001 HC PRIVATE ROOM DAILY

## 2024-04-03 PROCEDURE — 2500000004 HC RX 250 GENERAL PHARMACY W/ HCPCS (ALT 636 FOR OP/ED): Performed by: ANESTHESIOLOGY

## 2024-04-03 PROCEDURE — 3700000002 HC GENERAL ANESTHESIA TIME - EACH INCREMENTAL 1 MINUTE: Performed by: NURSE ANESTHETIST, CERTIFIED REGISTERED

## 2024-04-03 PROCEDURE — 7100000002 HC RECOVERY ROOM TIME - EACH INCREMENTAL 1 MINUTE: Performed by: NURSE ANESTHETIST, CERTIFIED REGISTERED

## 2024-04-03 PROCEDURE — 2500000004 HC RX 250 GENERAL PHARMACY W/ HCPCS (ALT 636 FOR OP/ED)

## 2024-04-03 PROCEDURE — 2500000001 HC RX 250 WO HCPCS SELF ADMINISTERED DRUGS (ALT 637 FOR MEDICARE OP)

## 2024-04-03 PROCEDURE — 0DBB8ZX EXCISION OF ILEUM, VIA NATURAL OR ARTIFICIAL OPENING ENDOSCOPIC, DIAGNOSTIC: ICD-10-PCS | Performed by: INTERNAL MEDICINE

## 2024-04-03 PROCEDURE — 7100000001 HC RECOVERY ROOM TIME - INITIAL BASE CHARGE: Performed by: NURSE ANESTHETIST, CERTIFIED REGISTERED

## 2024-04-03 PROCEDURE — 3700000001 HC GENERAL ANESTHESIA TIME - INITIAL BASE CHARGE: Performed by: NURSE ANESTHETIST, CERTIFIED REGISTERED

## 2024-04-03 PROCEDURE — 99232 SBSQ HOSP IP/OBS MODERATE 35: CPT | Performed by: NURSE PRACTITIONER

## 2024-04-03 PROCEDURE — 0DBE8ZX EXCISION OF LARGE INTESTINE, VIA NATURAL OR ARTIFICIAL OPENING ENDOSCOPIC, DIAGNOSTIC: ICD-10-PCS | Performed by: INTERNAL MEDICINE

## 2024-04-03 PROCEDURE — 3600000007 HC OR TIME - EACH INCREMENTAL 1 MINUTE - PROCEDURE LEVEL TWO: Performed by: NURSE ANESTHETIST, CERTIFIED REGISTERED

## 2024-04-03 PROCEDURE — 88305 TISSUE EXAM BY PATHOLOGIST: CPT | Performed by: PATHOLOGY

## 2024-04-03 PROCEDURE — 2500000004 HC RX 250 GENERAL PHARMACY W/ HCPCS (ALT 636 FOR OP/ED): Performed by: STUDENT IN AN ORGANIZED HEALTH CARE EDUCATION/TRAINING PROGRAM

## 2024-04-03 PROCEDURE — 2500000004 HC RX 250 GENERAL PHARMACY W/ HCPCS (ALT 636 FOR OP/ED): Performed by: FAMILY MEDICINE

## 2024-04-03 PROCEDURE — 99232 SBSQ HOSP IP/OBS MODERATE 35: CPT | Performed by: STUDENT IN AN ORGANIZED HEALTH CARE EDUCATION/TRAINING PROGRAM

## 2024-04-03 PROCEDURE — 3600000002 HC OR TIME - INITIAL BASE CHARGE - PROCEDURE LEVEL TWO: Performed by: NURSE ANESTHETIST, CERTIFIED REGISTERED

## 2024-04-03 PROCEDURE — 2500000001 HC RX 250 WO HCPCS SELF ADMINISTERED DRUGS (ALT 637 FOR MEDICARE OP): Performed by: INTERNAL MEDICINE

## 2024-04-03 PROCEDURE — 2500000004 HC RX 250 GENERAL PHARMACY W/ HCPCS (ALT 636 FOR OP/ED): Performed by: NURSE ANESTHETIST, CERTIFIED REGISTERED

## 2024-04-03 RX ORDER — ONDANSETRON HYDROCHLORIDE 2 MG/ML
4 INJECTION, SOLUTION INTRAVENOUS EVERY 4 HOURS PRN
Status: DISCONTINUED | OUTPATIENT
Start: 2024-04-03 | End: 2024-04-05 | Stop reason: HOSPADM

## 2024-04-03 RX ORDER — ONDANSETRON HYDROCHLORIDE 2 MG/ML
8 INJECTION, SOLUTION INTRAVENOUS ONCE
Status: DISCONTINUED | OUTPATIENT
Start: 2024-04-03 | End: 2024-04-05 | Stop reason: HOSPADM

## 2024-04-03 RX ORDER — ALBUTEROL SULFATE 0.83 MG/ML
2.5 SOLUTION RESPIRATORY (INHALATION) ONCE AS NEEDED
Status: DISCONTINUED | OUTPATIENT
Start: 2024-04-03 | End: 2024-04-05 | Stop reason: HOSPADM

## 2024-04-03 RX ORDER — SODIUM CHLORIDE, SODIUM LACTATE, POTASSIUM CHLORIDE, CALCIUM CHLORIDE 600; 310; 30; 20 MG/100ML; MG/100ML; MG/100ML; MG/100ML
100 INJECTION, SOLUTION INTRAVENOUS CONTINUOUS
Status: CANCELLED | OUTPATIENT
Start: 2024-04-03

## 2024-04-03 RX ORDER — PROPOFOL 10 MG/ML
INJECTION, EMULSION INTRAVENOUS AS NEEDED
Status: DISCONTINUED | OUTPATIENT
Start: 2024-04-03 | End: 2024-04-03

## 2024-04-03 RX ORDER — ONDANSETRON HYDROCHLORIDE 2 MG/ML
4 INJECTION, SOLUTION INTRAVENOUS EVERY 4 HOURS PRN
Status: DISCONTINUED | OUTPATIENT
Start: 2024-04-03 | End: 2024-04-03

## 2024-04-03 RX ORDER — ACETAMINOPHEN 325 MG/1
650 TABLET ORAL EVERY 4 HOURS PRN
Status: DISCONTINUED | OUTPATIENT
Start: 2024-04-03 | End: 2024-04-05 | Stop reason: HOSPADM

## 2024-04-03 RX ORDER — MIDAZOLAM HYDROCHLORIDE 1 MG/ML
INJECTION INTRAMUSCULAR; INTRAVENOUS AS NEEDED
Status: DISCONTINUED | OUTPATIENT
Start: 2024-04-03 | End: 2024-04-03

## 2024-04-03 RX ADMIN — PROPOFOL 20 MG: 10 INJECTION, EMULSION INTRAVENOUS at 11:52

## 2024-04-03 RX ADMIN — PROPOFOL 50 MG: 10 INJECTION, EMULSION INTRAVENOUS at 11:40

## 2024-04-03 RX ADMIN — HYDROMORPHONE HYDROCHLORIDE 2 MG: 1 INJECTION, SOLUTION INTRAMUSCULAR; INTRAVENOUS; SUBCUTANEOUS at 20:51

## 2024-04-03 RX ADMIN — PROPOFOL 100 MG: 10 INJECTION, EMULSION INTRAVENOUS at 11:35

## 2024-04-03 RX ADMIN — FLUOXETINE HYDROCHLORIDE 40 MG: 20 CAPSULE ORAL at 09:20

## 2024-04-03 RX ADMIN — ONDANSETRON 4 MG: 2 INJECTION INTRAMUSCULAR; INTRAVENOUS at 11:55

## 2024-04-03 RX ADMIN — ONDANSETRON 4 MG: 2 INJECTION INTRAMUSCULAR; INTRAVENOUS at 21:28

## 2024-04-03 RX ADMIN — PROPOFOL 50 MG: 10 INJECTION, EMULSION INTRAVENOUS at 11:45

## 2024-04-03 RX ADMIN — HYDROMORPHONE HYDROCHLORIDE 0.5 MG: 1 INJECTION, SOLUTION INTRAMUSCULAR; INTRAVENOUS; SUBCUTANEOUS at 12:25

## 2024-04-03 RX ADMIN — FOLIC ACID 1 MG: 1 TABLET ORAL at 09:20

## 2024-04-03 RX ADMIN — DROSPIRENONE 1 TABLET: 4 TABLET, FILM COATED ORAL at 21:36

## 2024-04-03 RX ADMIN — HYDROMORPHONE HYDROCHLORIDE 2 MG: 1 INJECTION, SOLUTION INTRAMUSCULAR; INTRAVENOUS; SUBCUTANEOUS at 08:26

## 2024-04-03 RX ADMIN — CLONIDINE HYDROCHLORIDE 0.2 MG: 0.1 TABLET ORAL at 20:51

## 2024-04-03 RX ADMIN — MIRTAZAPINE 7.5 MG: 15 TABLET, FILM COATED ORAL at 20:51

## 2024-04-03 RX ADMIN — PROPOFOL 100 MG: 10 INJECTION, EMULSION INTRAVENOUS at 11:27

## 2024-04-03 RX ADMIN — PROPOFOL 100 MG: 10 INJECTION, EMULSION INTRAVENOUS at 11:30

## 2024-04-03 RX ADMIN — HYDROMORPHONE HYDROCHLORIDE 2 MG: 1 INJECTION, SOLUTION INTRAMUSCULAR; INTRAVENOUS; SUBCUTANEOUS at 04:30

## 2024-04-03 RX ADMIN — ONDANSETRON 4 MG: 2 INJECTION INTRAMUSCULAR; INTRAVENOUS at 08:22

## 2024-04-03 RX ADMIN — HYDROMORPHONE HYDROCHLORIDE 2 MG: 1 INJECTION, SOLUTION INTRAMUSCULAR; INTRAVENOUS; SUBCUTANEOUS at 16:19

## 2024-04-03 RX ADMIN — MIDAZOLAM HYDROCHLORIDE 2 MG: 1 INJECTION, SOLUTION INTRAMUSCULAR; INTRAVENOUS at 11:26

## 2024-04-03 RX ADMIN — METHYLPREDNISOLONE SODIUM SUCCINATE 125 MG: 125 INJECTION, POWDER, FOR SOLUTION INTRAMUSCULAR; INTRAVENOUS at 16:11

## 2024-04-03 SDOH — HEALTH STABILITY: MENTAL HEALTH: CURRENT SMOKER: 0

## 2024-04-03 ASSESSMENT — COGNITIVE AND FUNCTIONAL STATUS - GENERAL
DAILY ACTIVITIY SCORE: 24
MOBILITY SCORE: 24

## 2024-04-03 ASSESSMENT — PAIN SCALES - GENERAL
PAINLEVEL_OUTOF10: 10 - WORST POSSIBLE PAIN
PAINLEVEL_OUTOF10: 3
PAINLEVEL_OUTOF10: 8
PAINLEVEL_OUTOF10: 5 - MODERATE PAIN
PAINLEVEL_OUTOF10: 4
PAINLEVEL_OUTOF10: 5 - MODERATE PAIN
PAINLEVEL_OUTOF10: 7
PAINLEVEL_OUTOF10: 3
PAINLEVEL_OUTOF10: 9
PAINLEVEL_OUTOF10: 0 - NO PAIN
PAIN_LEVEL: 0
PAINLEVEL_OUTOF10: 7
PAINLEVEL_OUTOF10: 7

## 2024-04-03 ASSESSMENT — PAIN - FUNCTIONAL ASSESSMENT
PAIN_FUNCTIONAL_ASSESSMENT: 0-10

## 2024-04-03 ASSESSMENT — PAIN DESCRIPTION - LOCATION
LOCATION: ABDOMEN

## 2024-04-03 NOTE — PROGRESS NOTES
"Allyssa Kelly is a 24 y.o. female on day 0 of admission presenting with Abdominal pain.    Subjective   Tolerated prep, has ongoing pain        Objective     Physical Exam  General: Alert and awake, NAD, No acute distress  HENT: normocephalic, PERRL, anicteric  Skin: no jaundice, intact   RESP: Nonlabored on RA  CARD: RRR  GI: soft, mild diffuse pain with no rebound, ND, no ascites  Extremities: no edema  Neuro: A&Ox3, no asterixis  Psych: Calm and cooperative   Last Recorded Vitals  Blood pressure (!) 131/97, pulse 57, temperature 36.9 °C (98.4 °F), resp. rate 16, height 1.753 m (5' 9\"), weight 72.6 kg (160 lb), SpO2 98 %.  Intake/Output last 3 Shifts:  No intake/output data recorded.    Relevant Results  Scheduled medications  cloNIDine, 0.2 mg, oral, Nightly  dicyclomine, 10 mg, oral, 4x daily  drospirenone (contraceptive), 4 mg, oral, Daily  FLUoxetine, 40 mg, oral, Daily  folic acid, 1 mg, oral, Once per day on Sun Tue Wed Thu Fri Sat  lactated Ringer's, 1,000 mL, intravenous, Once  methotrexate, 15 mg, oral, Weekly  mirtazapine, 7.5 mg, oral, Nightly      Continuous medications     PRN medications  PRN medications: acetaminophen **OR** acetaminophen **OR** acetaminophen, HYDROmorphone, HYDROmorphone, ketorolac, ondansetron **OR** ondansetron, polyethylene glycol, SUMAtriptan        ECG 12 lead    Result Date: 4/2/2024  Normal sinus rhythm with sinus arrhythmia Normal ECG When compared with ECG of 24-NOV-2014 09:11, PREVIOUS ECG IS PRESENT See ED provider note for full interpretation and clinical correlation Confirmed by Niki Mello ([id number]) on 4/2/2024 2:03:29 PM    CT abdomen pelvis w IV contrast    Result Date: 3/31/2024  Interpreted By:  Dion Bowling, STUDY: CT ABDOMEN PELVIS W IV CONTRAST;  3/31/2024 10:43 pm   INDICATION: Signs/Symptoms:abd pain and distended abd. History of Lupus..   COMPARISON: 3/1/2024   ACCESSION NUMBER(S): [id number]   ORDERING CLINICIAN: AMBERLY ROMO   TECHNIQUE: " Contiguous axial images of the abdomen and pelvis were obtained after the intravenous administration of  contrast. Coronal and sagittal reformatted images were obtained from the axial images.   FINDINGS: No basilar airspace disease. No pleural effusion.   No evidence of liver mass. The gallbladder is contracted and not well evaluated. No dilatation common bile duct.   The pancreas, spleen, and adrenal glands appear unremarkable.   Symmetric enhancement of the kidneys. No hydronephrosis.   No evidence of bowel obstruction or acute appendicitis.   Urinary bladder is underdistended and not well evaluated.   Limited evaluation of the uterus and adnexa. Minimal trace fluid in the pelvis.   No acute fracture of the lumbar spine.       No evidence of acute abnormality of the abdominal viscera.   No evidence of bowel obstruction or acute appendicitis.   MACRO: None   Signed by: Dion Bowling 3/31/2024 11:18 PM Dictation workstation:   [id number]    US PELVIS TRANSABDOMINAL WITH TRANSVAGINAL    Result Date: 3/14/2024  STUDY: Pelvic Ultrasound; 3/14/2024 11:14 AM INDICATION: Pelvic pain, intermittently since 2/2024.  Additional History:  LMP 11/2023.  IUD placed 6/2023. COMPARISON: US Pelvis 2/29/2024, CT A/P 3/1/2024, 2/29/2024. ACCESSION NUMBER(S): [id number] ORDERING CLINICIAN: IVANA CASTILLO TECHNIQUE: Transabdominal and transvaginal ultrasonography of the pelvis was performed with spectral Doppler evaluation of the ovaries. FINDINGS: UTERUS:   The uterus is retroverted in position and measures 6.0 x 3.0 x 4.3 cm.  The uterus demonstrates a normal, homogeneous echotexture.  There are no discrete fibroids present.  ENDOMETRIUM: The endometrium measures 0.2 cm.  IUD is identified within the endometrium, questionably situated low in the uterus.  Trace free fluid is noted within the endometrium. RIGHT OVARY: The right ovary measures 4.0 x 1.7 x 2.8 cm.  The right ovary demonstrates a normal echotexture.  Spectral Doppler  evaluation of the ovary was performed.  Normal color and spectral Doppler flow is visualized.  LEFT OVARY: The left ovary measures 3.3 x 1.5 x 2.0 cm.  The left ovary demonstrates a normal echotexture.  Spectral Doppler evaluation of the ovary was performed.  Normal color and spectral Doppler flow is visualized.   OTHER: No significant fluid is present within the cul-de-sac.    Unremarkable ultrasound of the pelvis. Normal color and spectral Doppler flow within both ovaries. IUD is identified within the endometrium, questionably situated low in the uterus. Signed by Triston Méndez MD    * Cannot find OR log *  Last relevant procedure:                          Assessment/Plan   Principal Problem:    Abdominal pain      24 year old female admitted with abdominal pain, bloating. H Pylori, celiac an SIBO testing negative on outpatient workup. Recent CT noted constipation. Will treat constipation and with colonoscopy today given chronicity of symptoms.     -NPO  -Colonoscopy today  -Recommend bowel regimen with Miralax 1-2 times per day  -Monitor stool output  -Avoid dairy   -Would limit NSAIDs and Narcotics as able     GI will continue to follow, please contact with any questions.      Discussed with Dr Galdamez, who is in agreement.     Fani Rubio, CNP

## 2024-04-03 NOTE — PROGRESS NOTES
04/03/24 1114   Discharge Planning   Living Arrangements Spouse/significant other   Support Systems Spouse/significant other   Assistance Needed Alert and oriented x 3, Independent with ADL's, Drives, No DME, Student, and is employed   Type of Residence Private residence   Do you have animals or pets at home? Yes   Type of Animals or Pets 1 dog   Who is requesting discharge planning? Provider   Home or Post Acute Services None   Patient expects to be discharged to: Home with significant other and no discharge needs have been identified, Colonoscopy today   Does the patient need discharge transport arranged? No   Patient Choice   Provider Choice list and CMS website (https://medicare.gov/care-compare#search) for post-acute Quality and Resource Measure Data were provided and reviewed with: Patient   Patient / Family choosing to utilize agency / facility established prior to hospitalization No

## 2024-04-03 NOTE — PROGRESS NOTES
Allyssa Kelly is a 24 y.o. female on day 0 of admission presenting with Abdominal pain.      Subjective   Patient is complaining of abdominal pain, status post colonoscopy today which was normal, biopsy was sent  Plan for EGD in am, NPO after MN  Mother at bedside and all the questions were answered    Objective     Last Recorded Vitals  /82   Pulse 77   Temp 36.8 °C (98.2 °F) (Temporal)   Resp 16   Wt 72.6 kg (160 lb)   SpO2 98%   Intake/Output last 3 Shifts:    Intake/Output Summary (Last 24 hours) at 4/3/2024 1432  Last data filed at 4/3/2024 1300  Gross per 24 hour   Intake 60 ml   Output --   Net 60 ml       Admission Weight  Weight: 72.6 kg (160 lb) (03/31/24 2004)    Daily Weight  03/31/24 : 72.6 kg (160 lb)    Image Results  Colonoscopy Diagnostic  Table formatting from the original result was not included.  Impression  The terminal ileum, ileocecal valve and cecum appeared normal. Performed   random biopsy using biopsy forceps.    Findings  All observed locations appeared normal, including the terminal ileum,   ileocecal valve and cecum. Performed random biopsy using biopsy forceps.   Suboptimal prep in the right colon;       Recommendation   Await pathology results    Next colonoscopy at the age of 47.  Colonoscopy findings do not explain etiology of patient's abdominal   pain/change in bowel habits.  Symptomatic management       Indication  Generalized abdominal pain  Change in bowel habit-mostly loose  Staff  Staff Role   Migue Galdamez MD Proceduralist     Medications  See Anesthesia Record.     Preprocedure  A history and physical has been performed, and patient medication   allergies have been reviewed. The patient's tolerance of previous   anesthesia has been reviewed. The risks and benefits of the procedure and   the sedation options and risks were discussed with the patient. All   questions were answered and informed consent obtained.    Details of the Procedure  The patient  underwent monitored anesthesia care, which was administered by   an anesthesia professional. The patient's blood pressure, ECG, ETCO2,   heart rate, level of consciousness, oxygen and respirations were monitored   throughout the procedure. A digital rectal exam was performed. The scope   was introduced through the anus and advanced to the terminal ileum. Bowel   prep was not adequate. The patient's estimated blood loss was minimal (<5   mL). The procedure was not difficult. The patient tolerated the procedure   well. There were no apparent adverse events. Suboptimal prep in right   colon.     Events  Procedure Events   Event Event Time   ENDO SCOPE IN TIME 4/3/2024 11:32 AM   ENDO CECUM REACHED 4/3/2024 11:45 AM   ENDO SCOPE OUT TIME 4/3/2024 11:58 AM     Specimens  ID Type Source Tests Collected by Time   1 : TERMINAL ILEUM RULE OUT CROHNS Tissue TERMINAL ILEUM BIOPSY SURGICAL   PATHOLOGY EXAM Migue Galdamez MD 4/3/2024 1153   2 : RANDOM COLON BIOPSY Tissue COLON  - RANDOM BIOPSY SURGICAL PATHOLOGY   EXAM Migue Galdamez MD 4/3/2024 1156     Procedure Location  [address]  [address] [facility] [id number] [phone]    Referring Provider  No referring provider defined for this encounter.    Procedure Provider  No name on file      Physical Exam    General: Well-developed female, ill-appearing, distressed due to pain  HEENT: AT, NC, no JVD, no lymphadenopathy, neck supple  Lungs: Clear, no wheezing, no crackles  Cardiac: Normal S1-S2, no murmur, no gallop  Abdomen: Soft, generalized tenderness, no distention, positive bowel sound  Extremities: No deformity, no edema, pulses intact, ROM intact  Neurological: Alert awake oriented x3, sensation intact, clear speech        Assessment/Plan   Principal Problem:    Abdominal pain    This is a 24-year-old female with a history of cutaneous lupus, migraines, anxiety who was admitted for management of  recurrent abdominal pain and bloating    Pain management, regular diet, antiemetic  N.p.o. after midnight for EGD in a.m.  GI following, s/p colonoscopy with normal results, biopsy sent for pathology  Avoid diary, limit NSAIDs and narcotic use  Will give 1 dose of Solu-Medrol 125 mg IV, if patient pain improves we will continue with tapering dose of steroid on discharge  Continue with home meds for anxiety and lupus  Will check inflammatory markers and C3 complement  VTE prophylaxis: Low risk  Disposition: Home once hemodynamically stable    Ange Hardwick MD

## 2024-04-03 NOTE — ANESTHESIA POSTPROCEDURE EVALUATION
Patient: Allyssa Kelly    Procedure Summary       Date: 04/03/24 Room / Location: [facility] OR    Anesthesia Start: 1120 Anesthesia Stop: 1205    Procedure: COLONOSCOPY Diagnosis: Generalized abdominal pain    Scheduled Providers: Migue Galdamez MD Responsible Provider: NISHI Christensen    Anesthesia Type: MAC ASA Status: 2            Anesthesia Type: MAC    Vitals Value Taken Time   BP 94/63 04/03/24 1205   Temp 36.9 04/03/24 1205   Pulse 88 04/03/24 1205   Resp 16 04/03/24 1205   SpO2 100 04/03/24 1205       Anesthesia Post Evaluation    Patient location during evaluation: bedside  Patient participation: complete - patient participated  Level of consciousness: awake  Pain score: 0  Pain management: adequate  Multimodal analgesia pain management approach  Airway patency: patent  Cardiovascular status: acceptable  Respiratory status: acceptable  Hydration status: acceptable  Postoperative Nausea and Vomiting: none        No notable events documented.

## 2024-04-03 NOTE — ANESTHESIA PREPROCEDURE EVALUATION
Patient: Allyssa Kelly    Procedure Information       Date/Time: 04/03/24 1120    Scheduled providers: Migue Galdamez MD    Procedure: COLONOSCOPY    Location: [facility] [place] OR          Vitals:    04/03/24 0359   BP: 115/74   Pulse: 71   Resp: 16   Temp: 36.5 °C (97.7 °F)   SpO2: 95%       Past Surgical History:   Procedure Laterality Date    OTHER SURGICAL HISTORY  12/11/2020    Foot surgery    OTHER SURGICAL HISTORY  10/19/2020    Wrist surgery    OTHER SURGICAL HISTORY  03/25/2022    Temporomandibular joint surgery    OTHER SURGICAL HISTORY  09/16/2020    Surgery    OTHER SURGICAL HISTORY  05/07/2021    Arthrocentesis (Therapeutic)     Past Medical History:   Diagnosis Date    Acute pharyngitis, unspecified 07/21/2014    Sore throat    Acute pharyngitis, unspecified 04/18/2017    Sore throat    Acute tonsillitis, unspecified 03/22/2021    Acute tonsillitis, unspecified    Allergy, unspecified, initial encounter 10/08/2021    Hypersensitivity    Anxiety     Arthralgia of temporomandibular joint, unspecified side 04/07/2014    Temporomandibular joint pain    Body mass index (BMI) 19.9 or less, adult 09/17/2021    Body mass index (BMI) of 19.9 or less in adult    Body mass index (BMI) pediatric, 5th percentile to less than 85th percentile for age 09/17/2021    BMI (body mass index), pediatric, 5% to less than 85% for age    Calculus of kidney with calculus of ureter 02/12/2022    Calculus of kidney with calculus of ureter    Candidiasis of skin and nail 08/31/2020    Candidal skin infection    Cellulitis of left toe 04/06/2018    Cellulitis of fifth toe of left foot    Contact with and (suspected) exposure to other viral communicable diseases 02/13/2020    Exposure to influenza    COVID-19 12/30/2020    COVID-19 virus infection    Displaced fracture of shaft of third metacarpal bone, left hand, initial encounter for closed fracture 12/05/2016    Closed displaced fracture of shaft of third  metacarpal bone of left hand, initial encounter    Encounter for general adult medical examination without abnormal findings 09/17/2021    Examination, routine, over 18 years of age    Encounter for pre-employment examination 09/17/2021    Encounter for pre-employment health screening examination    Encounter for routine child health examination without abnormal findings 10/03/2018    Encounter for routine child health examination without abnormal findings    Hordeolum externum right upper eyelid 11/19/2021    Hordeolum externum of right upper eyelid    Jaw pain 10/07/2020    Chronic jaw pain    Joint derangement, unspecified 10/15/2021    Hypermobility of joint    Local infection of the skin and subcutaneous tissue, unspecified 04/26/2022    Infection of skin of finger    Localized swelling, mass and lump, head 04/28/2017    Tongue swelling    Nonscarring hair loss, unspecified 10/03/2018    Hair thinning    Other chronic diseases of tonsils and adenoids 04/28/2017    Cryptic tonsil    Other conditions influencing health status 11/18/2021    Encounter for procedure    Other diseases of tongue 04/28/2017    Tongue lesion    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    Other specified respiratory disorders 02/18/2019    Viral respiratory illness    Pain in left arm 11/22/2021    Pain of left upper extremity    Pain in right ankle and joints of right foot 09/23/2021    Right ankle pain    Pain in right wrist 06/08/2016    Right wrist pain    Pain in unspecified knee 09/30/2014    Joint pain, knee    Pain in unspecified shoulder 10/27/2014    Shoulder pain    Pain in unspecified wrist 11/22/2021    Chronic wrist pain    Panic attacks     Personal history of diseases of the skin and subcutaneous tissue 08/12/2017    History of telogen effluvium    Personal history of diseases of the skin and subcutaneous tissue 12/30/2020    History of pilonidal cyst    Personal history of diseases of  the skin and subcutaneous tissue 04/06/2015    History of contact dermatitis    Personal history of other (healed) physical injury and trauma 11/19/2018    History of insect bite    Personal history of other diseases of the digestive system 02/13/2020    History of gastroenteritis    Personal history of other diseases of the female genital tract 08/12/2017    History of dysmenorrhea    Personal history of other diseases of the musculoskeletal system and connective tissue 12/14/2017    History of low back pain    Personal history of other diseases of the musculoskeletal system and connective tissue 05/10/2022    History of muscle pain    Personal history of other diseases of the respiratory system 11/19/2021    History of sore throat    Personal history of other diseases of the respiratory system 01/17/2017    History of acute sinusitis    Personal history of other diseases of the respiratory system 02/18/2019    History of sore throat    Personal history of other diseases of the respiratory system 07/21/2014    History of pharyngitis    Personal history of other diseases of the respiratory system 01/07/2015    History of influenza    Personal history of other diseases of the respiratory system 01/17/2017    History of acute sinusitis    Personal history of other diseases of urinary system 02/12/2022    History of hematuria    Personal history of other infectious and parasitic diseases 07/21/2014    History of viral infection    Personal history of other specified conditions 02/12/2022    History of dysuria    Personal history of other specified conditions 02/12/2022    History of dysuria    Personal history of other specified conditions 09/20/2021    History of insomnia    Personal history of other specified conditions 10/15/2021    History of urinary urgency    Personal history of other specified conditions 10/15/2021    History of urinary frequency    Personal history of other specified conditions 04/05/2021     History of weight loss    Personal history of other specified conditions 02/18/2019    History of fever    Personal history of other specified conditions 03/15/2021    History of nasal congestion    Personal history of other specified conditions 02/21/2020    History of diarrhea    Personal history of other specified conditions 12/01/2016    History of abdominal pain    Personal history of other specified conditions 01/07/2015    History of vomiting    Personal history of other specified conditions 01/07/2015    History of fever    PONV (postoperative nausea and vomiting)     WEARS PATCH    Premenstrual dysphoric disorder 01/25/2016    PMDD (premenstrual dysphoric disorder)    Raised antibody titer 02/24/2020    Elevated EBV antibody titer    Right lower quadrant pain 02/21/2020    Bilateral lower abdominal pain    Sprain of interphalangeal joint of right middle finger, initial encounter 03/06/2018    Sprain of interphalangeal joint of right middle finger, initial encounter    Strain of unspecified muscle(s) and tendon(s) at lower leg level, left leg, initial encounter 12/10/2018    Strain of left knee    Superficial mycosis, unspecified 10/17/2015    Fungal rash of trunk    Tic disorder, unspecified 11/01/2021    Tic disorder    TMJ (dislocation of temporomandibular joint)     Unspecified abdominal pain 12/01/2016    Abdominal pain, acute    Unspecified acute conjunctivitis, left eye 12/01/2016    Acute conjunctivitis, left eye    Unspecified conjunctivitis 11/13/2015    Conjunctivitis, left eye    Unspecified fracture of left wrist and hand, initial encounter for closed fracture 12/29/2016    Fracture of left hand, closed, initial encounter    Unspecified injury of left wrist, hand and finger(s), initial encounter 12/03/2016    Injury of left hand, initial encounter    Unspecified injury of right lower leg, initial encounter 12/07/2015    Right knee injury    Unspecified injury of right wrist, hand and finger(s),  initial encounter 03/28/2017    Injury of right hand, initial encounter    Unspecified injury of right wrist, hand and finger(s), initial encounter 03/06/2018    Injury of finger of right hand, initial encounter    Unspecified injury of unspecified foot, initial encounter 05/02/2015    Foot injury    Unspecified injury of unspecified wrist, hand and finger(s), initial encounter     Finger injury    Unspecified renal colic 02/12/2022    Ureteral colic    Vomiting, unspecified 02/25/2020    Intermittent vomiting       Current Facility-Administered Medications:     acetaminophen (Tylenol) tablet 650 mg, 650 mg, oral, q4h PRN, 650 mg at 04/02/24 1807 **OR** acetaminophen (Tylenol) oral liquid 650 mg, 650 mg, nasogastric tube, q4h PRN **OR** acetaminophen (Tylenol) suppository 650 mg, 650 mg, rectal, q4h PRN, Romero Macias DO    cloNIDine (Catapres) tablet 0.2 mg, 0.2 mg, oral, Nightly, Romero Macias DO, 0.2 mg at 04/02/24 2135    dicyclomine (Bentyl) capsule 10 mg, 10 mg, oral, 4x daily, Mckenna Mandel MD, 10 mg at 04/02/24 1640    drospirenone (contraceptive) tablet 1 tablet, 4 mg, oral, Daily, Casey Sauceda MD, 1 tablet at 04/02/24 2334    FLUoxetine (PROzac) capsule 40 mg, 40 mg, oral, Daily, Romero Macias DO, 40 mg at 04/03/24 0920    folic acid (Folvite) tablet 1 mg, 1 mg, oral, Once per day on Sun Tue Wed Thu Fri Sat, Romero Macias DO, 1 mg at 04/03/24 0920    HYDROmorphone (Dilaudid) injection 1 mg, 1 mg, intravenous, q4h PRN, Piter Padilla DO    HYDROmorphone (Dilaudid) injection 2 mg, 2 mg, intravenous, q4h PRN, Piter Padilla DO, 2 mg at 04/03/24 0826    ketorolac (Toradol) injection 30 mg, 30 mg, intravenous, q6h PRN, Romero Macias DO, 30 mg at 04/02/24 1446    lactated Ringer's bolus 1,000 mL, 1,000 mL, intravenous, Once, Romero Macias DO    methotrexate (Trexall) tablet 15 mg, 15 mg, oral, Weekly, Romero Macias DO, 15 mg at 04/01/24 0812    mirtazapine (Remeron)  tablet 7.5 mg, 7.5 mg, oral, Nightly, Manasla Colleen, DO, 7.5 mg at 04/02/24 2135    ondansetron (Zofran) tablet 4 mg, 4 mg, oral, q8h PRN **OR** ondansetron (Zofran) injection 4 mg, 4 mg, intravenous, q8h PRN, Romero Berryndria, DO, 4 mg at 04/03/24 0822    polyethylene glycol (Glycolax, Miralax) packet 17 g, 17 g, oral, Daily PRN, Romero Evansria, DO    SUMAtriptan (Imitrex) tablet 50 mg, 50 mg, oral, q6h PRN, Roemro Macias DO  Prior to Admission medications    Medication Sig Start Date End Date Taking? Authorizing Provider   ascorbic acid, vitamin C, 500 mg capsule Take 1 tablet by mouth once daily. TAKE PER DIRECTED    Historical Provider, MD   B complex-vitamin C-folic acid (Nephro-Harley Rx) 1- mg-mg-mcg tablet Take 1 tablet by mouth. TAKE PER DIRECTED    Historical Provider, MD   cholecalciferol (Vitamin D-3) 25 MCG (1000 UT) capsule Take 1 capsule (25 mcg) by mouth once daily. TAKE PER DIRECTED    Historical Provider, MD   cloNIDine ER (Kapvay) 0.1 mg tablet extended release 12 hr Take 2 tablets (0.2 mg) by mouth once daily at bedtime. 10/30/23 7/26/24  Becky Rubi MD   FLUoxetine (PROzac) 40 mg capsule TAKE 1 CAPSULE BY MOUTH ONCE DAILY 10/30/23 7/26/24  Becky Rubi MD   folic acid (Folvite) 1 mg tablet Take 1 tablet (1 mg) by mouth once daily. 6 DAYS PER WEEK. NOT TO TAKE ON DAY when takes methotrexate 12/19/23 12/18/24  Betsy Boateng MD   HYDROcodone-acetaminophen (Norco) 5-325 mg tablet Take 1 tablet by mouth every 6 hours if needed for severe pain (7 - 10).  Patient not taking: Reported on 3/11/2024 3/2/24   Piter Padilla DO   methotrexate (Trexall) 2.5 mg tablet Take 6 tablets (15 mg total) by mouth 1 (one) time per week. 3/11/24 6/9/24  Betsy Boateng MD   mirtazapine (Remeron) 7.5 mg tablet TAKE 1 TABLET (7.5 MG) BY MOUTH ONCE DAILY AT BEDTIME. 2/29/24 11/25/24  Becky Rubi MD   MULTIVITAMIN-FERROUS FUMARATE-FOLIC ACID 9 MG-200 MCG TABLET, HALF TABLET, (Centrum) Take  "1 half tablet by mouth once daily. 10/19/22   Historical Provider, MD   ondansetron ODT (Zofran-ODT) 4 mg disintegrating tablet Take 1 tablet (4 mg) by mouth every 8 hours if needed for nausea or vomiting. 3/2/24   Piter Padilla DO   predniSONE (Deltasone) 10 mg tablet Take 1 tablet (10 mg) by mouth once daily. Take 4 pills once a day for 3 days then 3 pills once a day for 3 days then 2 pills once a day for 3 days then one pill once a day for 3 days then STOP 3/28/24   Loyd Moralez MD   rizatriptan (Maxalt) 10 mg tablet Take 1 tablet (10 mg) by mouth once daily as needed for migraine. 1/24/22   Historical Provider, MD     Allergies   Allergen Reactions    Tretinoin Rash     Social History     Tobacco Use    Smoking status: Never    Smokeless tobacco: Never   Substance Use Topics    Alcohol use: Yes     Comment: social         Chemistry    Lab Results   Component Value Date/Time     04/01/2024 0715    K 4.0 04/01/2024 0715     04/01/2024 0715    CO2 25 04/01/2024 0715    BUN 8 04/01/2024 0715    CREATININE 0.68 04/01/2024 0715    Lab Results   Component Value Date/Time    CALCIUM 9.3 04/01/2024 0715    ALKPHOS 70 03/31/2024 2055    AST 14 03/31/2024 2055    ALT 15 03/31/2024 2055    BILITOT 0.3 03/31/2024 2055          Lab Results   Component Value Date/Time    WBC 6.9 03/31/2024 2055    HGB 14.6 03/31/2024 2055    HCT 44.1 03/31/2024 2055     03/31/2024 2055     No results found for: \"PROTIME\", \"PTT\", \"INR\"  Encounter Date: 03/31/24   ECG 12 lead   Result Value    Ventricular Rate 82    Atrial Rate 82    GA Interval 112    QRS Duration 74    QT Interval 374    QTC Calculation(Bazett) 436    P Axis 69    R Axis 68    T Axis 48    QRS Count 13    Q Onset 223    P Onset 167    P Offset 213    T Offset 410    QTC Fredericia 415    Narrative    Normal sinus rhythm with sinus arrhythmia  Normal ECG  When compared with ECG of 24-NOV-2014 09:11,  PREVIOUS ECG IS PRESENT  See ED provider note " for full interpretation and clinical correlation  Confirmed by Niki Mello ([id number]) on 4/2/2024 2:03:29 PM     No results found for this or any previous visit from the past 1095 days.    Relevant Problems   Neuro   (+) Complex regional pain syndrome type 1 of left upper extremity   (+) Generalized anxiety disorder   (+) Social anxiety disorder   (+) Ulnar neuropathy at elbow, left      /Renal   (+) Calculus of kidney with calculus of ureter      Musculoskeletal   (+) Complex regional pain syndrome type 1 of left upper extremity   (+) Neck pain, chronic      Skin   (+) Eczema       Clinical information reviewed:                   NPO Detail:  No data recorded     Physical Exam    Airway  Mallampati: II     Cardiovascular - normal exam     Dental    Pulmonary    Abdominal            Anesthesia Plan    History of general anesthesia?: yes  History of complications of general anesthesia?: no    ASA 2     MAC     The patient is not a current smoker.  Patient was not previously instructed to abstain from smoking on day of procedure.  Patient did not smoke on day of procedure.  Education provided regarding risk of obstructive sleep apnea.  intravenous induction   Anesthetic plan and risks discussed with patient.    Plan discussed with CRNA.

## 2024-04-04 ENCOUNTER — ANESTHESIA (OUTPATIENT)
Dept: OPERATING ROOM | Facility: HOSPITAL | Age: 24
DRG: 392 | End: 2024-04-04
Payer: COMMERCIAL

## 2024-04-04 ENCOUNTER — APPOINTMENT (OUTPATIENT)
Dept: OPERATING ROOM | Facility: HOSPITAL | Age: 24
DRG: 392 | End: 2024-04-04
Payer: COMMERCIAL

## 2024-04-04 PROBLEM — R11.2 PONV (POSTOPERATIVE NAUSEA AND VOMITING): Status: ACTIVE | Noted: 2024-04-04

## 2024-04-04 PROBLEM — E87.1 HYPONATREMIA: Status: ACTIVE | Noted: 2024-04-04

## 2024-04-04 PROBLEM — D68.62 LUPUS ANTICOAGULANT DISORDER (MULTI): Status: ACTIVE | Noted: 2024-04-04

## 2024-04-04 PROBLEM — Z86.69 HISTORY OF MIGRAINE HEADACHES: Status: ACTIVE | Noted: 2024-04-04

## 2024-04-04 PROBLEM — Z98.890 PONV (POSTOPERATIVE NAUSEA AND VOMITING): Status: ACTIVE | Noted: 2024-04-04

## 2024-04-04 LAB
ANION GAP SERPL CALC-SCNC: 12 MMOL/L (ref 10–20)
BUN SERPL-MCNC: 9 MG/DL (ref 6–23)
C3 SERPL-MCNC: 159 MG/DL (ref 87–200)
CALCIUM SERPL-MCNC: 9.5 MG/DL (ref 8.6–10.3)
CHLORIDE SERPL-SCNC: 101 MMOL/L (ref 98–107)
CO2 SERPL-SCNC: 24 MMOL/L (ref 21–32)
CREAT SERPL-MCNC: 0.69 MG/DL (ref 0.5–1.05)
EGFRCR SERPLBLD CKD-EPI 2021: >90 ML/MIN/1.73M*2
ERYTHROCYTE [DISTWIDTH] IN BLOOD BY AUTOMATED COUNT: 13.2 % (ref 11.5–14.5)
ERYTHROCYTE [SEDIMENTATION RATE] IN BLOOD BY WESTERGREN METHOD: 23 MM/H (ref 0–20)
GLUCOSE SERPL-MCNC: 128 MG/DL (ref 74–99)
HCT VFR BLD AUTO: 43 % (ref 36–46)
HGB BLD-MCNC: 14.5 G/DL (ref 12–16)
MCH RBC QN AUTO: 28.4 PG (ref 26–34)
MCHC RBC AUTO-ENTMCNC: 33.7 G/DL (ref 32–36)
MCV RBC AUTO: 84 FL (ref 80–100)
NRBC BLD-RTO: 0 /100 WBCS (ref 0–0)
PLATELET # BLD AUTO: 295 X10*3/UL (ref 150–450)
POTASSIUM SERPL-SCNC: 4.4 MMOL/L (ref 3.5–5.3)
RBC # BLD AUTO: 5.11 X10*6/UL (ref 4–5.2)
SODIUM SERPL-SCNC: 133 MMOL/L (ref 136–145)
WBC # BLD AUTO: 8 X10*3/UL (ref 4.4–11.3)

## 2024-04-04 PROCEDURE — 2500000005 HC RX 250 GENERAL PHARMACY W/O HCPCS: Performed by: ANESTHESIOLOGY

## 2024-04-04 PROCEDURE — 99232 SBSQ HOSP IP/OBS MODERATE 35: CPT | Performed by: NURSE PRACTITIONER

## 2024-04-04 PROCEDURE — 0DD38ZX EXTRACTION OF LOWER ESOPHAGUS, VIA NATURAL OR ARTIFICIAL OPENING ENDOSCOPIC, DIAGNOSTIC: ICD-10-PCS | Performed by: INTERNAL MEDICINE

## 2024-04-04 PROCEDURE — 86160 COMPLEMENT ANTIGEN: CPT | Mod: GEALAB | Performed by: STUDENT IN AN ORGANIZED HEALTH CARE EDUCATION/TRAINING PROGRAM

## 2024-04-04 PROCEDURE — 7100000002 HC RECOVERY ROOM TIME - EACH INCREMENTAL 1 MINUTE: Performed by: NURSE ANESTHETIST, CERTIFIED REGISTERED

## 2024-04-04 PROCEDURE — 99232 SBSQ HOSP IP/OBS MODERATE 35: CPT | Performed by: STUDENT IN AN ORGANIZED HEALTH CARE EDUCATION/TRAINING PROGRAM

## 2024-04-04 PROCEDURE — 1100000001 HC PRIVATE ROOM DAILY

## 2024-04-04 PROCEDURE — 2500000005 HC RX 250 GENERAL PHARMACY W/O HCPCS: Performed by: NURSE ANESTHETIST, CERTIFIED REGISTERED

## 2024-04-04 PROCEDURE — 2720000007 HC OR 272 NO HCPCS: Performed by: NURSE ANESTHETIST, CERTIFIED REGISTERED

## 2024-04-04 PROCEDURE — 2500000001 HC RX 250 WO HCPCS SELF ADMINISTERED DRUGS (ALT 637 FOR MEDICARE OP): Performed by: INTERNAL MEDICINE

## 2024-04-04 PROCEDURE — 2500000004 HC RX 250 GENERAL PHARMACY W/ HCPCS (ALT 636 FOR OP/ED): Performed by: ANESTHESIOLOGY

## 2024-04-04 PROCEDURE — 2500000004 HC RX 250 GENERAL PHARMACY W/ HCPCS (ALT 636 FOR OP/ED)

## 2024-04-04 PROCEDURE — 88305 TISSUE EXAM BY PATHOLOGIST: CPT | Mod: TC,GEALAB | Performed by: INTERNAL MEDICINE

## 2024-04-04 PROCEDURE — 36415 COLL VENOUS BLD VENIPUNCTURE: CPT | Performed by: STUDENT IN AN ORGANIZED HEALTH CARE EDUCATION/TRAINING PROGRAM

## 2024-04-04 PROCEDURE — 7100000001 HC RECOVERY ROOM TIME - INITIAL BASE CHARGE: Performed by: NURSE ANESTHETIST, CERTIFIED REGISTERED

## 2024-04-04 PROCEDURE — 3700000001 HC GENERAL ANESTHESIA TIME - INITIAL BASE CHARGE: Performed by: NURSE ANESTHETIST, CERTIFIED REGISTERED

## 2024-04-04 PROCEDURE — 2500000004 HC RX 250 GENERAL PHARMACY W/ HCPCS (ALT 636 FOR OP/ED): Performed by: NURSE ANESTHETIST, CERTIFIED REGISTERED

## 2024-04-04 PROCEDURE — 0DB98ZX EXCISION OF DUODENUM, VIA NATURAL OR ARTIFICIAL OPENING ENDOSCOPIC, DIAGNOSTIC: ICD-10-PCS | Performed by: INTERNAL MEDICINE

## 2024-04-04 PROCEDURE — 99221 1ST HOSP IP/OBS SF/LOW 40: CPT | Performed by: OBSTETRICS & GYNECOLOGY

## 2024-04-04 PROCEDURE — 88305 TISSUE EXAM BY PATHOLOGIST: CPT | Performed by: PATHOLOGY

## 2024-04-04 PROCEDURE — 3600000002 HC OR TIME - INITIAL BASE CHARGE - PROCEDURE LEVEL TWO: Performed by: NURSE ANESTHETIST, CERTIFIED REGISTERED

## 2024-04-04 PROCEDURE — 0DD18ZX EXTRACTION OF UPPER ESOPHAGUS, VIA NATURAL OR ARTIFICIAL OPENING ENDOSCOPIC, DIAGNOSTIC: ICD-10-PCS | Performed by: INTERNAL MEDICINE

## 2024-04-04 PROCEDURE — 3700000002 HC GENERAL ANESTHESIA TIME - EACH INCREMENTAL 1 MINUTE: Performed by: NURSE ANESTHETIST, CERTIFIED REGISTERED

## 2024-04-04 PROCEDURE — A43239 PR EDG TRANSORAL BIOPSY SINGLE/MULTIPLE: Performed by: NURSE ANESTHETIST, CERTIFIED REGISTERED

## 2024-04-04 PROCEDURE — 2500000004 HC RX 250 GENERAL PHARMACY W/ HCPCS (ALT 636 FOR OP/ED): Performed by: FAMILY MEDICINE

## 2024-04-04 PROCEDURE — 43239 EGD BIOPSY SINGLE/MULTIPLE: CPT | Performed by: INTERNAL MEDICINE

## 2024-04-04 PROCEDURE — 2500000004 HC RX 250 GENERAL PHARMACY W/ HCPCS (ALT 636 FOR OP/ED): Performed by: STUDENT IN AN ORGANIZED HEALTH CARE EDUCATION/TRAINING PROGRAM

## 2024-04-04 PROCEDURE — 88112 CYTOPATH CELL ENHANCE TECH: CPT | Performed by: PATHOLOGY

## 2024-04-04 PROCEDURE — 3600000007 HC OR TIME - EACH INCREMENTAL 1 MINUTE - PROCEDURE LEVEL TWO: Performed by: NURSE ANESTHETIST, CERTIFIED REGISTERED

## 2024-04-04 PROCEDURE — 88112 CYTOPATH CELL ENHANCE TECH: CPT | Mod: TC | Performed by: INTERNAL MEDICINE

## 2024-04-04 PROCEDURE — 2500000001 HC RX 250 WO HCPCS SELF ADMINISTERED DRUGS (ALT 637 FOR MEDICARE OP)

## 2024-04-04 PROCEDURE — 85652 RBC SED RATE AUTOMATED: CPT | Performed by: STUDENT IN AN ORGANIZED HEALTH CARE EDUCATION/TRAINING PROGRAM

## 2024-04-04 PROCEDURE — 85027 COMPLETE CBC AUTOMATED: CPT | Performed by: STUDENT IN AN ORGANIZED HEALTH CARE EDUCATION/TRAINING PROGRAM

## 2024-04-04 PROCEDURE — 80048 BASIC METABOLIC PNL TOTAL CA: CPT | Performed by: STUDENT IN AN ORGANIZED HEALTH CARE EDUCATION/TRAINING PROGRAM

## 2024-04-04 RX ORDER — ONDANSETRON HYDROCHLORIDE 2 MG/ML
4 INJECTION, SOLUTION INTRAVENOUS ONCE AS NEEDED
Status: COMPLETED | OUTPATIENT
Start: 2024-04-04 | End: 2024-04-04

## 2024-04-04 RX ORDER — LIDOCAINE HCL/PF 100 MG/5ML
SYRINGE (ML) INTRAVENOUS AS NEEDED
Status: DISCONTINUED | OUTPATIENT
Start: 2024-04-04 | End: 2024-04-04

## 2024-04-04 RX ORDER — ALBUTEROL SULFATE 0.83 MG/ML
2.5 SOLUTION RESPIRATORY (INHALATION) ONCE AS NEEDED
Status: DISCONTINUED | OUTPATIENT
Start: 2024-04-04 | End: 2024-04-05 | Stop reason: HOSPADM

## 2024-04-04 RX ORDER — PROPOFOL 10 MG/ML
INJECTION, EMULSION INTRAVENOUS AS NEEDED
Status: DISCONTINUED | OUTPATIENT
Start: 2024-04-04 | End: 2024-04-04

## 2024-04-04 RX ORDER — SODIUM CHLORIDE, SODIUM LACTATE, POTASSIUM CHLORIDE, CALCIUM CHLORIDE 600; 310; 30; 20 MG/100ML; MG/100ML; MG/100ML; MG/100ML
100 INJECTION, SOLUTION INTRAVENOUS CONTINUOUS
Status: DISCONTINUED | OUTPATIENT
Start: 2024-04-04 | End: 2024-04-05 | Stop reason: HOSPADM

## 2024-04-04 RX ORDER — ACETAMINOPHEN 325 MG/1
650 TABLET ORAL EVERY 4 HOURS PRN
Status: DISCONTINUED | OUTPATIENT
Start: 2024-04-04 | End: 2024-04-05 | Stop reason: HOSPADM

## 2024-04-04 RX ORDER — SODIUM CHLORIDE, SODIUM LACTATE, POTASSIUM CHLORIDE, CALCIUM CHLORIDE 600; 310; 30; 20 MG/100ML; MG/100ML; MG/100ML; MG/100ML
50 INJECTION, SOLUTION INTRAVENOUS CONTINUOUS
Status: DISCONTINUED | OUTPATIENT
Start: 2024-04-04 | End: 2024-04-04

## 2024-04-04 RX ORDER — ONDANSETRON HYDROCHLORIDE 2 MG/ML
8 INJECTION, SOLUTION INTRAVENOUS ONCE
Status: DISCONTINUED | OUTPATIENT
Start: 2024-04-04 | End: 2024-04-05 | Stop reason: HOSPADM

## 2024-04-04 RX ORDER — MIDAZOLAM HYDROCHLORIDE 1 MG/ML
INJECTION INTRAMUSCULAR; INTRAVENOUS AS NEEDED
Status: DISCONTINUED | OUTPATIENT
Start: 2024-04-04 | End: 2024-04-04

## 2024-04-04 RX ADMIN — KETOROLAC TROMETHAMINE 30 MG: 30 INJECTION INTRAMUSCULAR; INTRAVENOUS at 14:05

## 2024-04-04 RX ADMIN — CLONIDINE HYDROCHLORIDE 0.2 MG: 0.1 TABLET ORAL at 21:24

## 2024-04-04 RX ADMIN — FLUOXETINE HYDROCHLORIDE 40 MG: 20 CAPSULE ORAL at 18:31

## 2024-04-04 RX ADMIN — SODIUM CHLORIDE, SODIUM LACTATE, POTASSIUM CHLORIDE, AND CALCIUM CHLORIDE: 600; 310; 30; 20 INJECTION, SOLUTION INTRAVENOUS at 11:57

## 2024-04-04 RX ADMIN — PROPOFOL 50 MG: 10 INJECTION, EMULSION INTRAVENOUS at 12:00

## 2024-04-04 RX ADMIN — HYDROMORPHONE HYDROCHLORIDE 2 MG: 1 INJECTION, SOLUTION INTRAMUSCULAR; INTRAVENOUS; SUBCUTANEOUS at 21:24

## 2024-04-04 RX ADMIN — Medication: at 13:15

## 2024-04-04 RX ADMIN — HYDROMORPHONE HYDROCHLORIDE 2 MG: 1 INJECTION, SOLUTION INTRAMUSCULAR; INTRAVENOUS; SUBCUTANEOUS at 04:05

## 2024-04-04 RX ADMIN — ONDANSETRON 4 MG: 2 INJECTION INTRAMUSCULAR; INTRAVENOUS at 12:03

## 2024-04-04 RX ADMIN — PROPOFOL 50 MG: 10 INJECTION, EMULSION INTRAVENOUS at 12:03

## 2024-04-04 RX ADMIN — PROPOFOL 50 MG: 10 INJECTION, EMULSION INTRAVENOUS at 12:05

## 2024-04-04 RX ADMIN — HYDROMORPHONE HYDROCHLORIDE 0.5 MG: 1 INJECTION, SOLUTION INTRAMUSCULAR; INTRAVENOUS; SUBCUTANEOUS at 13:00

## 2024-04-04 RX ADMIN — LIDOCAINE HYDROCHLORIDE 100 MG: 20 INJECTION, SOLUTION INTRAVENOUS at 12:03

## 2024-04-04 RX ADMIN — DROSPIRENONE 1 TABLET: 4 TABLET, FILM COATED ORAL at 21:28

## 2024-04-04 RX ADMIN — HYDROMORPHONE HYDROCHLORIDE 1 MG: 1 INJECTION, SOLUTION INTRAMUSCULAR; INTRAVENOUS; SUBCUTANEOUS at 17:53

## 2024-04-04 RX ADMIN — MIDAZOLAM HYDROCHLORIDE 2 MG: 1 INJECTION, SOLUTION INTRAMUSCULAR; INTRAVENOUS at 11:58

## 2024-04-04 RX ADMIN — ONDANSETRON 4 MG: 2 INJECTION INTRAMUSCULAR; INTRAVENOUS at 12:37

## 2024-04-04 RX ADMIN — MIRTAZAPINE 7.5 MG: 15 TABLET, FILM COATED ORAL at 21:23

## 2024-04-04 RX ADMIN — FOLIC ACID 1 MG: 1 TABLET ORAL at 18:31

## 2024-04-04 RX ADMIN — Medication: at 13:00

## 2024-04-04 RX ADMIN — PROPOFOL 50 MG: 10 INJECTION, EMULSION INTRAVENOUS at 12:07

## 2024-04-04 SDOH — HEALTH STABILITY: MENTAL HEALTH: CURRENT SMOKER: 0

## 2024-04-04 ASSESSMENT — PAIN SCALES - GENERAL
PAINLEVEL_OUTOF10: 8
PAINLEVEL_OUTOF10: 7
PAINLEVEL_OUTOF10: 4
PAINLEVEL_OUTOF10: 8
PAINLEVEL_OUTOF10: 10 - WORST POSSIBLE PAIN
PAINLEVEL_OUTOF10: 7
PAINLEVEL_OUTOF10: 6
PAINLEVEL_OUTOF10: 5 - MODERATE PAIN
PAINLEVEL_OUTOF10: 7

## 2024-04-04 ASSESSMENT — PAIN DESCRIPTION - LOCATION
LOCATION: ABDOMEN

## 2024-04-04 ASSESSMENT — COGNITIVE AND FUNCTIONAL STATUS - GENERAL
MOBILITY SCORE: 24
DAILY ACTIVITIY SCORE: 24
MOBILITY SCORE: 24
DAILY ACTIVITIY SCORE: 24

## 2024-04-04 ASSESSMENT — PAIN - FUNCTIONAL ASSESSMENT
PAIN_FUNCTIONAL_ASSESSMENT: 0-10

## 2024-04-04 ASSESSMENT — PAIN DESCRIPTION - ORIENTATION: ORIENTATION: RIGHT

## 2024-04-04 NOTE — ANESTHESIA POSTPROCEDURE EVALUATION
Patient: Allyssa Kelly    Procedure Summary       Date: 04/04/24 Room / Location: [facility] OR    Anesthesia Start: 1157 Anesthesia Stop: 1219    Procedure: EGD Diagnosis: Generalized abdominal pain    Scheduled Providers: Migue Galdamez MD Responsible Provider: NISHI Lora    Anesthesia Type: MAC ASA Status: 2            Anesthesia Type: MAC    Vitals Value Taken Time   /76 04/04/24 1232   Temp 36.8 °C (98.2 °F) 04/04/24 1217   Pulse 65 04/04/24 1232   Resp 16 04/04/24 1232   SpO2 99 % 04/04/24 1232       Anesthesia Post Evaluation    Patient location during evaluation: PACU  Patient participation: complete - patient participated  Level of consciousness: awake and alert  Pain management: satisfactory to patient  Airway patency: patent  Cardiovascular status: acceptable and blood pressure returned to baseline  Respiratory status: acceptable  Hydration status: acceptable  Postoperative Nausea and Vomiting: mild        No notable events documented.

## 2024-04-04 NOTE — PROGRESS NOTES
"Allyssa Kelly is a 24 y.o. female on day 1 of admission presenting with Abdominal pain.    Subjective   Ongoing abdominal pain and bloating        Objective     Physical Exam  General: Alert and awake, NAD  HENT: normocephalic, PERRL, anicteric  Skin: no jaundice, intact   RESP: Nonlabored on RA  CARD: RRR  GI: soft, mild distension, diffuse tenderness with no rebound or guarding, no ascites  Extremities: no edema  Neuro: A&Ox3, no asterixis  Psych: Calm and cooperative   Last Recorded Vitals  Blood pressure 133/90, pulse 90, temperature 37 °C (98.6 °F), resp. rate 16, height 1.753 m (5' 9\"), weight 72.6 kg (160 lb), SpO2 97 %.  Intake/Output last 3 Shifts:  I/O last 3 completed shifts:  In: 540 (7.4 mL/kg) [P.O.:540]  Out: - (0 mL/kg)   Weight: 72.6 kg     Relevant Results  Scheduled medications  cloNIDine, 0.2 mg, oral, Nightly  drospirenone (contraceptive), 4 mg, oral, Daily  FLUoxetine, 40 mg, oral, Daily  folic acid, 1 mg, oral, Once per day on Sun Tue Wed Thu Fri Sat  lactated Ringer's, 1,000 mL, intravenous, Once  [START ON 4/5/2024] linaCLOtide, 290 mcg, oral, Daily before breakfast  methotrexate, 15 mg, oral, Weekly  mirtazapine, 7.5 mg, oral, Nightly  ondansetron, 8 mg, intravenous, Once  oxygen, , inhalation, Continuous - 02/gases      Continuous medications     PRN medications  PRN medications: acetaminophen **OR** acetaminophen **OR** acetaminophen, acetaminophen, albuterol, HYDROmorphone, HYDROmorphone, HYDROmorphone, HYDROmorphone, ketorolac, ondansetron, polyethylene glycol, promethazine (Phenergan) 6.25 mg in sodium chloride 0.9% 50 mL IV, SUMAtriptan    Results for orders placed or performed during the hospital encounter of 03/31/24 (from the past 24 hour(s))   CBC   Result Value Ref Range    WBC 8.0 4.4 - 11.3 x10*3/uL    nRBC 0.0 0.0 - 0.0 /100 WBCs    RBC 5.11 4.00 - 5.20 x10*6/uL    Hemoglobin 14.5 12.0 - 16.0 g/dL    Hematocrit 43.0 36.0 - 46.0 %    MCV 84 80 - 100 fL    MCH 28.4 26.0 - " 34.0 pg    MCHC 33.7 32.0 - 36.0 g/dL    RDW 13.2 11.5 - 14.5 %    Platelets 295 150 - 450 x10*3/uL   Basic Metabolic Panel   Result Value Ref Range    Glucose 128 (H) 74 - 99 mg/dL    Sodium 133 (L) 136 - 145 mmol/L    Potassium 4.4 3.5 - 5.3 mmol/L    Chloride 101 98 - 107 mmol/L    Bicarbonate 24 21 - 32 mmol/L    Anion Gap 12 10 - 20 mmol/L    Urea Nitrogen 9 6 - 23 mg/dL    Creatinine 0.69 0.50 - 1.05 mg/dL    eGFR >90 >60 mL/min/1.73m*2    Calcium 9.5 8.6 - 10.3 mg/dL   Sedimentation rate, automated   Result Value Ref Range    Sedimentation Rate 23 (H) 0 - 20 mm/h        Colonoscopy Diagnostic    Result Date: 4/3/2024  Table formatting from the original result was not included. Impression The terminal ileum, ileocecal valve and cecum appeared normal. Performed random biopsy using biopsy forceps. Findings All observed locations appeared normal, including the terminal ileum, ileocecal valve and cecum. Performed random biopsy using biopsy forceps. Suboptimal prep in the right colon;  Recommendation  Await pathology results  Next colonoscopy at the age of 47. Colonoscopy findings do not explain etiology of patient's abdominal pain/change in bowel habits. Symptomatic management  Indication Generalized abdominal pain Change in bowel habit-mostly loose Staff Staff Role Migue Galdamez MD Proceduralist Medications See Anesthesia Record. Preprocedure A history and physical has been performed, and patient medication allergies have been reviewed. The patient's tolerance of previous anesthesia has been reviewed. The risks and benefits of the procedure and the sedation options and risks were discussed with the patient. All questions were answered and informed consent obtained. Details of the Procedure The patient underwent monitored anesthesia care, which was administered by an anesthesia professional. The patient's blood pressure, ECG, ETCO2, heart rate, level of consciousness, oxygen and respirations were monitored  throughout the procedure. A digital rectal exam was performed. The scope was introduced through the anus and advanced to the terminal ileum. Bowel prep was not adequate. The patient's estimated blood loss was minimal (<5 mL). The procedure was not difficult. The patient tolerated the procedure well. There were no apparent adverse events. Suboptimal prep in right colon. Events Procedure Events Event Event Time ENDO SCOPE IN TIME 4/3/2024 11:32 AM ENDO CECUM REACHED 4/3/2024 11:45 AM ENDO SCOPE OUT TIME 4/3/2024 11:58 AM Specimens ID Type Source Tests Collected by Time 1 : TERMINAL ILEUM RULE OUT CROHNS Tissue TERMINAL ILEUM BIOPSY SURGICAL PATHOLOGY EXAM Migue Galdamez MD 4/3/2024 1153 2 : RANDOM COLON BIOPSY Tissue COLON  - RANDOM BIOPSY SURGICAL PATHOLOGY EXAM Migue Galdamez MD 4/3/2024 1156 Procedure Location [facility] [address] [facility] [id number] [facility] [id number] [phone] Referring Provider No referring provider defined for this encounter. Procedure Provider No name on file     ECG 12 lead    Result Date: 4/2/2024  Normal sinus rhythm with sinus arrhythmia Normal ECG When compared with ECG of 24-NOV-2014 09:11, PREVIOUS ECG IS PRESENT See ED provider note for full interpretation and clinical correlation Confirmed by Niki Mello ([id number]) on 4/2/2024 2:03:29 PM    CT abdomen pelvis w IV contrast    Result Date: 3/31/2024  Interpreted By:  Dion Bowling, STUDY: CT ABDOMEN PELVIS W IV CONTRAST;  3/31/2024 10:43 pm   INDICATION: Signs/Symptoms:abd pain and distended abd. History of Lupus..   COMPARISON: 3/1/2024   ACCESSION NUMBER(S): [id number]   ORDERING CLINICIAN: AMBERLY ROMO   TECHNIQUE: Contiguous axial images of the abdomen and pelvis were obtained after the intravenous administration of  contrast. Coronal and sagittal reformatted images were obtained from the axial images.   FINDINGS: No basilar airspace disease. No pleural effusion.    No evidence of liver mass. The gallbladder is contracted and not well evaluated. No dilatation common bile duct.   The pancreas, spleen, and adrenal glands appear unremarkable.   Symmetric enhancement of the kidneys. No hydronephrosis.   No evidence of bowel obstruction or acute appendicitis.   Urinary bladder is underdistended and not well evaluated.   Limited evaluation of the uterus and adnexa. Minimal trace fluid in the pelvis.   No acute fracture of the lumbar spine.       No evidence of acute abnormality of the abdominal viscera.   No evidence of bowel obstruction or acute appendicitis.   MACRO: None   Signed by: Dion Bowling 3/31/2024 11:18 PM Dictation workstation:   [id number]    US PELVIS TRANSABDOMINAL WITH TRANSVAGINAL    Result Date: 3/14/2024  STUDY: Pelvic Ultrasound; 3/14/2024 11:14 AM INDICATION: Pelvic pain, intermittently since 2/2024.  Additional History:  LMP 11/2023.  IUD placed 6/2023. COMPARISON: US Pelvis 2/29/2024, CT A/P 3/1/2024, 2/29/2024. ACCESSION NUMBER(S): [id number] ORDERING CLINICIAN: IVANA CASTILLO TECHNIQUE: Transabdominal and transvaginal ultrasonography of the pelvis was performed with spectral Doppler evaluation of the ovaries. FINDINGS: UTERUS:   The uterus is retroverted in position and measures 6.0 x 3.0 x 4.3 cm.  The uterus demonstrates a normal, homogeneous echotexture.  There are no discrete fibroids present.  ENDOMETRIUM: The endometrium measures 0.2 cm.  IUD is identified within the endometrium, questionably situated low in the uterus.  Trace free fluid is noted within the endometrium. RIGHT OVARY: The right ovary measures 4.0 x 1.7 x 2.8 cm.  The right ovary demonstrates a normal echotexture.  Spectral Doppler evaluation of the ovary was performed.  Normal color and spectral Doppler flow is visualized.  LEFT OVARY: The left ovary measures 3.3 x 1.5 x 2.0 cm.  The left ovary demonstrates a normal echotexture.  Spectral Doppler evaluation of the ovary was  performed.  Normal color and spectral Doppler flow is visualized.   OTHER: No significant fluid is present within the cul-de-sac.    Unremarkable ultrasound of the pelvis. Normal color and spectral Doppler flow within both ovaries. IUD is identified within the endometrium, questionably situated low in the uterus. Signed by Triston Méndez MD    * Cannot find OR log *  Last relevant procedure: Colonoscopy on 4/3/24                         Assessment/Plan   Principal Problem:    Abdominal pain  Active Problems:    Generalized abdominal pain    24 year old female admitted with abdominal pain, bloating. H Pylori, celiac and SIBO testing negative on outpatient workup. Recent CT noted constipation. Colonoscopy normal, did note stool in right colon.      -NPO  -EGD today  -Start Linzess 290mcg daily for IBS-C   -Monitor stool output  -Avoid dairy   -Would limit NSAIDs and Narcotics as able     GI will continue to follow, please contact with any questions.      Discussed with Dr Galdamez, who is in agreement.     Fani Rubio, CNP

## 2024-04-04 NOTE — DISCHARGE INSTRUCTIONS

## 2024-04-04 NOTE — CARE PLAN
Problem: Skin  Goal: Promote/optimize nutrition  Outcome: Progressing   The patient's goals for the shift include      The clinical goals for the shift include pt will remain comfortable    Problem: Pain  Goal: Takes deep breaths with improved pain control throughout the shift  Outcome: Progressing

## 2024-04-04 NOTE — ANESTHESIA PREPROCEDURE EVALUATION
Patient: Allyssa Kelly    Procedure Information       Date/Time: 04/04/24 1200    Scheduled providers: Migue Galdamez MD    Procedure: EGD    Location: [facility] OR            Relevant Problems   Anesthesia   (+) PONV (postoperative nausea and vomiting)      Cardiac (within normal limits)      Pulmonary (within normal limits)      Neuro   (+) Complex regional pain syndrome type 1 of left upper extremity   (+) Generalized anxiety disorder   (+) History of migraine headaches   (+) Social anxiety disorder   (+) Ulnar neuropathy at elbow, left      GI  Abdominal pain      /Renal   (+) Calculus of kidney with calculus of ureter   (+) Hyponatremia      Liver (within normal limits)      Musculoskeletal   (+) Complex regional pain syndrome type 1 of left upper extremity   (+) Neck pain, chronic      Skin   (+) Eczema     Vitals:    04/04/24 0939   BP: 126/76   Pulse: 90   Resp:    Temp:    SpO2:        Past Surgical History:   Procedure Laterality Date    OTHER SURGICAL HISTORY  12/11/2020    Foot surgery    OTHER SURGICAL HISTORY  10/19/2020    Wrist surgery    OTHER SURGICAL HISTORY  03/25/2022    Temporomandibular joint surgery    OTHER SURGICAL HISTORY  09/16/2020    Surgery    OTHER SURGICAL HISTORY  05/07/2021    Arthrocentesis (Therapeutic)     Past Medical History:   Diagnosis Date    Acute pharyngitis, unspecified 07/21/2014    Sore throat    Acute pharyngitis, unspecified 04/18/2017    Sore throat    Acute tonsillitis, unspecified 03/22/2021    Acute tonsillitis, unspecified    Allergy, unspecified, initial encounter 10/08/2021    Hypersensitivity    Anxiety     Arthralgia of temporomandibular joint, unspecified side 04/07/2014    Temporomandibular joint pain    Body mass index (BMI) 19.9 or less, adult 09/17/2021    Body mass index (BMI) of 19.9 or less in adult    Body mass index (BMI) pediatric, 5th percentile to less than 85th percentile for age 09/17/2021    BMI (body mass index),  pediatric, 5% to less than 85% for age    Calculus of kidney with calculus of ureter 02/12/2022    Calculus of kidney with calculus of ureter    Candidiasis of skin and nail 08/31/2020    Candidal skin infection    Cellulitis of left toe 04/06/2018    Cellulitis of fifth toe of left foot    Contact with and (suspected) exposure to other viral communicable diseases 02/13/2020    Exposure to influenza    COVID-19 12/30/2020    COVID-19 virus infection    Displaced fracture of shaft of third metacarpal bone, left hand, initial encounter for closed fracture 12/05/2016    Closed displaced fracture of shaft of third metacarpal bone of left hand, initial encounter    Encounter for general adult medical examination without abnormal findings 09/17/2021    Examination, routine, over 18 years of age    Encounter for pre-employment examination 09/17/2021    Encounter for pre-employment health screening examination    Encounter for routine child health examination without abnormal findings 10/03/2018    Encounter for routine child health examination without abnormal findings    Hordeolum externum right upper eyelid 11/19/2021    Hordeolum externum of right upper eyelid    Jaw pain 10/07/2020    Chronic jaw pain    Joint derangement, unspecified 10/15/2021    Hypermobility of joint    Local infection of the skin and subcutaneous tissue, unspecified 04/26/2022    Infection of skin of finger    Localized swelling, mass and lump, head 04/28/2017    Tongue swelling    Nonscarring hair loss, unspecified 10/03/2018    Hair thinning    Other chronic diseases of tonsils and adenoids 04/28/2017    Cryptic tonsil    Other conditions influencing health status 11/18/2021    Encounter for procedure    Other diseases of tongue 04/28/2017    Tongue lesion    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    Other specified respiratory disorders 02/18/2019    Viral respiratory illness    Pain in left arm 11/22/2021     Pain of left upper extremity    Pain in right ankle and joints of right foot 09/23/2021    Right ankle pain    Pain in right wrist 06/08/2016    Right wrist pain    Pain in unspecified knee 09/30/2014    Joint pain, knee    Pain in unspecified shoulder 10/27/2014    Shoulder pain    Pain in unspecified wrist 11/22/2021    Chronic wrist pain    Panic attacks     Personal history of diseases of the skin and subcutaneous tissue 08/12/2017    History of telogen effluvium    Personal history of diseases of the skin and subcutaneous tissue 12/30/2020    History of pilonidal cyst    Personal history of diseases of the skin and subcutaneous tissue 04/06/2015    History of contact dermatitis    Personal history of other (healed) physical injury and trauma 11/19/2018    History of insect bite    Personal history of other diseases of the digestive system 02/13/2020    History of gastroenteritis    Personal history of other diseases of the female genital tract 08/12/2017    History of dysmenorrhea    Personal history of other diseases of the musculoskeletal system and connective tissue 12/14/2017    History of low back pain    Personal history of other diseases of the musculoskeletal system and connective tissue 05/10/2022    History of muscle pain    Personal history of other diseases of the respiratory system 11/19/2021    History of sore throat    Personal history of other diseases of the respiratory system 01/17/2017    History of acute sinusitis    Personal history of other diseases of the respiratory system 02/18/2019    History of sore throat    Personal history of other diseases of the respiratory system 07/21/2014    History of pharyngitis    Personal history of other diseases of the respiratory system 01/07/2015    History of influenza    Personal history of other diseases of the respiratory system 01/17/2017    History of acute sinusitis    Personal history of other diseases of urinary system 02/12/2022    History  of hematuria    Personal history of other infectious and parasitic diseases 07/21/2014    History of viral infection    Personal history of other specified conditions 02/12/2022    History of dysuria    Personal history of other specified conditions 02/12/2022    History of dysuria    Personal history of other specified conditions 09/20/2021    History of insomnia    Personal history of other specified conditions 10/15/2021    History of urinary urgency    Personal history of other specified conditions 10/15/2021    History of urinary frequency    Personal history of other specified conditions 04/05/2021    History of weight loss    Personal history of other specified conditions 02/18/2019    History of fever    Personal history of other specified conditions 03/15/2021    History of nasal congestion    Personal history of other specified conditions 02/21/2020    History of diarrhea    Personal history of other specified conditions 12/01/2016    History of abdominal pain    Personal history of other specified conditions 01/07/2015    History of vomiting    Personal history of other specified conditions 01/07/2015    History of fever    PONV (postoperative nausea and vomiting)     WEARS PATCH    Premenstrual dysphoric disorder 01/25/2016    PMDD (premenstrual dysphoric disorder)    Raised antibody titer 02/24/2020    Elevated EBV antibody titer    Right lower quadrant pain 02/21/2020    Bilateral lower abdominal pain    Sprain of interphalangeal joint of right middle finger, initial encounter 03/06/2018    Sprain of interphalangeal joint of right middle finger, initial encounter    Strain of unspecified muscle(s) and tendon(s) at lower leg level, left leg, initial encounter 12/10/2018    Strain of left knee    Superficial mycosis, unspecified 10/17/2015    Fungal rash of trunk    Tic disorder, unspecified 11/01/2021    Tic disorder    TMJ (dislocation of temporomandibular joint)     Unspecified abdominal pain  12/01/2016    Abdominal pain, acute    Unspecified acute conjunctivitis, left eye 12/01/2016    Acute conjunctivitis, left eye    Unspecified conjunctivitis 11/13/2015    Conjunctivitis, left eye    Unspecified fracture of left wrist and hand, initial encounter for closed fracture 12/29/2016    Fracture of left hand, closed, initial encounter    Unspecified injury of left wrist, hand and finger(s), initial encounter 12/03/2016    Injury of left hand, initial encounter    Unspecified injury of right lower leg, initial encounter 12/07/2015    Right knee injury    Unspecified injury of right wrist, hand and finger(s), initial encounter 03/28/2017    Injury of right hand, initial encounter    Unspecified injury of right wrist, hand and finger(s), initial encounter 03/06/2018    Injury of finger of right hand, initial encounter    Unspecified injury of unspecified foot, initial encounter 05/02/2015    Foot injury    Unspecified injury of unspecified wrist, hand and finger(s), initial encounter     Finger injury    Unspecified renal colic 02/12/2022    Ureteral colic    Vomiting, unspecified 02/25/2020    Intermittent vomiting       Current Facility-Administered Medications:     acetaminophen (Tylenol) tablet 650 mg, 650 mg, oral, q4h PRN, 650 mg at 04/02/24 1807 **OR** acetaminophen (Tylenol) oral liquid 650 mg, 650 mg, nasogastric tube, q4h PRN **OR** acetaminophen (Tylenol) suppository 650 mg, 650 mg, rectal, q4h PRN, Romero Macias DO    acetaminophen (Tylenol) tablet 650 mg, 650 mg, oral, q4h PRN, Shoaib Osborne MD    albuterol 2.5 mg /3 mL (0.083 %) nebulizer solution 2.5 mg, 2.5 mg, nebulization, Once PRN, Shoaib Osborne MD    cloNIDine (Catapres) tablet 0.2 mg, 0.2 mg, oral, Nightly, Romero Macias DO, 0.2 mg at 04/03/24 2051    dicyclomine (Bentyl) capsule 10 mg, 10 mg, oral, 4x daily, Mceknna Mandel MD, 10 mg at 04/02/24 1640    drospirenone (contraceptive) tablet 1 tablet, 4 mg, oral, Daily,  Casey Sauceda MD, 1 tablet at 04/03/24 2136    FLUoxetine (PROzac) capsule 40 mg, 40 mg, oral, Daily, Romero Macias DO, 40 mg at 04/03/24 0920    folic acid (Folvite) tablet 1 mg, 1 mg, oral, Once per day on Sun Tue Wed Thu Fri Sat, Ahila Colleen, DO, 1 mg at 04/03/24 0920    HYDROmorphone (Dilaudid) injection 0.25 mg, 0.25 mg, intravenous, q5 min PRN, Shoaib Osborne MD    HYDROmorphone (Dilaudid) injection 0.5 mg, 0.5 mg, intravenous, q5 min PRN, Shoaib Osborne MD, 0.5 mg at 04/03/24 1225    HYDROmorphone (Dilaudid) injection 1 mg, 1 mg, intravenous, q4h PRN, Piter Padilla DO    HYDROmorphone (Dilaudid) injection 2 mg, 2 mg, intravenous, q4h PRN, Piter Padilla DO, 2 mg at 04/04/24 0405    ketorolac (Toradol) injection 30 mg, 30 mg, intravenous, q6h PRN, Romero Evansriarabella DO, 30 mg at 04/02/24 1446    lactated Ringer's bolus 1,000 mL, 1,000 mL, intravenous, Once, Romero Evansria DO    methotrexate (Trexall) tablet 15 mg, 15 mg, oral, Weekly, Romero Evansria DO, 15 mg at 04/01/24 0812    mirtazapine (Remeron) tablet 7.5 mg, 7.5 mg, oral, Nightly, Romero Evansria, DO, 7.5 mg at 04/03/24 2051    ondansetron (Zofran) injection 4 mg, 4 mg, intravenous, q4h PRN, Ange Hardwick MD, 4 mg at 04/03/24 2128    ondansetron (Zofran) injection 8 mg, 8 mg, intravenous, Once, Shoaib Osborne MD    oxygen (O2) therapy, , inhalation, Continuous - 02/gases, Shoaib Osborne MD    polyethylene glycol (Glycolax, Miralax) packet 17 g, 17 g, oral, Daily PRN, Romero Macias DO    promethazine (Phenergan) 6.25 mg in sodium chloride 0.9% 50 mL IV, 6.25 mg, intravenous, Once PRN, Shoaib Osborne MD    SUMAtriptan (Imitrex) tablet 50 mg, 50 mg, oral, q6h PRN, Romero Macias,   Prior to Admission medications    Medication Sig Start Date End Date Taking? Authorizing Provider   ascorbic acid, vitamin C, 500 mg capsule Take 1 tablet by mouth once daily. TAKE PER DIRECTED    Historical Provider,  MD   B complex-vitamin C-folic acid (Nephro-Harley Rx) 1- mg-mg-mcg tablet Take 1 tablet by mouth. TAKE PER DIRECTED    Historical Provider, MD   cholecalciferol (Vitamin D-3) 25 MCG (1000 UT) capsule Take 1 capsule (25 mcg) by mouth once daily. TAKE PER DIRECTED    Historical Provider, MD   cloNIDine ER (Kapvay) 0.1 mg tablet extended release 12 hr Take 2 tablets (0.2 mg) by mouth once daily at bedtime. 10/30/23 7/26/24  Becky Rubi MD   FLUoxetine (PROzac) 40 mg capsule TAKE 1 CAPSULE BY MOUTH ONCE DAILY 10/30/23 7/26/24  Becky Rubi MD   folic acid (Folvite) 1 mg tablet Take 1 tablet (1 mg) by mouth once daily. 6 DAYS PER WEEK. NOT TO TAKE ON DAY when takes methotrexate 12/19/23 12/18/24  Betsy Boateng MD   HYDROcodone-acetaminophen (Norco) 5-325 mg tablet Take 1 tablet by mouth every 6 hours if needed for severe pain (7 - 10).  Patient not taking: Reported on 3/11/2024 3/2/24   Piter Padilla DO   methotrexate (Trexall) 2.5 mg tablet Take 6 tablets (15 mg total) by mouth 1 (one) time per week. 3/11/24 6/9/24  Betsy Boateng MD   mirtazapine (Remeron) 7.5 mg tablet TAKE 1 TABLET (7.5 MG) BY MOUTH ONCE DAILY AT BEDTIME. 2/29/24 11/25/24  Becky Rubi MD   MULTIVITAMIN-FERROUS FUMARATE-FOLIC ACID 9 MG-200 MCG TABLET, HALF TABLET, (Centrum) Take 1 half tablet by mouth once daily. 10/19/22   Historical Provider, MD   ondansetron ODT (Zofran-ODT) 4 mg disintegrating tablet Take 1 tablet (4 mg) by mouth every 8 hours if needed for nausea or vomiting. 3/2/24   Piter Padilla DO   predniSONE (Deltasone) 10 mg tablet Take 1 tablet (10 mg) by mouth once daily. Take 4 pills once a day for 3 days then 3 pills once a day for 3 days then 2 pills once a day for 3 days then one pill once a day for 3 days then STOP 3/28/24   Loyd Moralez MD   rizatriptan (Maxalt) 10 mg tablet Take 1 tablet (10 mg) by mouth once daily as needed for migraine. 1/24/22   Historical Provider, MD     Allergies  "  Allergen Reactions    Tretinoin Rash     Social History     Tobacco Use    Smoking status: Never    Smokeless tobacco: Never   Substance Use Topics    Alcohol use: Yes     Comment: social         Chemistry    Lab Results   Component Value Date/Time     (L) 04/04/2024 0628    K 4.4 04/04/2024 0628     04/04/2024 0628    CO2 24 04/04/2024 0628    BUN 9 04/04/2024 0628    CREATININE 0.69 04/04/2024 0628    Lab Results   Component Value Date/Time    CALCIUM 9.5 04/04/2024 0628    ALKPHOS 70 03/31/2024 2055    AST 14 03/31/2024 2055    ALT 15 03/31/2024 2055    BILITOT 0.3 03/31/2024 2055          Lab Results   Component Value Date/Time    WBC 8.0 04/04/2024 0628    HGB 14.5 04/04/2024 0628    HCT 43.0 04/04/2024 0628     04/04/2024 0628     No results found for: \"PROTIME\", \"PTT\", \"INR\"  Encounter Date: 03/31/24   ECG 12 lead   Result Value    Ventricular Rate 82    Atrial Rate 82    IN Interval 112    QRS Duration 74    QT Interval 374    QTC Calculation(Bazett) 436    P Axis 69    R Axis 68    T Axis 48    QRS Count 13    Q Onset 223    P Onset 167    P Offset 213    T Offset 410    QTC Fredericia 415    Narrative    Normal sinus rhythm with sinus arrhythmia  Normal ECG  When compared with ECG of 24-NOV-2014 09:11,  PREVIOUS ECG IS PRESENT  See ED provider note for full interpretation and clinical correlation  Confirmed by Niki Mello ([id number]) on 4/2/2024 2:03:29 PM     Clinical information reviewed:   Tobacco  Allergies  Meds   Med Hx  Surg Hx  OB Status  Fam Hx  Soc   Hx        NPO Detail:  NPO/Void Status  Date of Last Liquid: 04/02/24  Date of Last Solid: 04/02/24    HCG negative 3/31/2024     Physical Exam    Airway  Mallampati: III  TM distance: >3 FB  Neck ROM: full     Cardiovascular   Rhythm: regular     Dental    Pulmonary   Breath sounds clear to auscultation     Abdominal            Anesthesia Plan    History of general anesthesia?: yes  History of complications of general " anesthesia?: no    ASA 2     MAC     The patient is not a current smoker.    Anesthetic plan and risks discussed with patient.  Use of blood products discussed with patient who consented to blood products.    Plan discussed with CRNA.

## 2024-04-04 NOTE — CONSULTS
Obstetrical Consult    Reason for Consult: abdominal pain    Subjective   23 yo G0 female currently admitted for abdominal pain of uncertain etiology.  Pt. In monogamous relationship. Pt. Had been using a combination contraceptive patch until 7/2023 when she had a copper IUD inserted because of some irregluar bleeding on the patch.  She had regular comfortable cycles with IUD until December of 2023.  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  Her menses did not resume and a couple mos. Later she began having episodic pain.  Work-ups were negative, however to eliminate the IUD as a potential source, she had the IUD removed approximately 3 wks ago and was started on Slynd (a progesterone only IUD).  This current episode of pain she describes as different, being more generalized abdominal and upper abdomen.  She denies dysmenorrhea, dyspareunia, abnormal discharge.  She denies fever, chills, dyuria.    Obstetrical History   OB History   No obstetric history on file.       Past Medical History  Past Medical History:   Diagnosis Date    Acute pharyngitis, unspecified 07/21/2014    Sore throat    Acute pharyngitis, unspecified 04/18/2017    Sore throat    Acute tonsillitis, unspecified 03/22/2021    Acute tonsillitis, unspecified    Allergy, unspecified, initial encounter 10/08/2021    Hypersensitivity    Anxiety     Arthralgia of temporomandibular joint, unspecified side 04/07/2014    Temporomandibular joint pain    Body mass index (BMI) 19.9 or less, adult 09/17/2021    Body mass index (BMI) of 19.9 or less in adult    Body mass index (BMI) pediatric, 5th percentile to less than 85th percentile for age 09/17/2021    BMI (body mass index), pediatric, 5% to less than 85% for age    Calculus of kidney with calculus of ureter 02/12/2022    Calculus of kidney with calculus of ureter    Candidiasis of skin and nail 08/31/2020    Candidal skin infection    Cellulitis of left toe 04/06/2018     Cellulitis of fifth toe of left foot    Contact with and (suspected) exposure to other viral communicable diseases 02/13/2020    Exposure to influenza    COVID-19 12/30/2020    COVID-19 virus infection    Displaced fracture of shaft of third metacarpal bone, left hand, initial encounter for closed fracture 12/05/2016    Closed displaced fracture of shaft of third metacarpal bone of left hand, initial encounter    Encounter for general adult medical examination without abnormal findings 09/17/2021    Examination, routine, over 18 years of age    Encounter for pre-employment examination 09/17/2021    Encounter for pre-employment health screening examination    Encounter for routine child health examination without abnormal findings 10/03/2018    Encounter for routine child health examination without abnormal findings    Hordeolum externum right upper eyelid 11/19/2021    Hordeolum externum of right upper eyelid    Jaw pain 10/07/2020    Chronic jaw pain    Joint derangement, unspecified 10/15/2021    Hypermobility of joint    Local infection of the skin and subcutaneous tissue, unspecified 04/26/2022    Infection of skin of finger    Localized swelling, mass and lump, head 04/28/2017    Tongue swelling    Nonscarring hair loss, unspecified 10/03/2018    Hair thinning    Other chronic diseases of tonsils and adenoids 04/28/2017    Cryptic tonsil    Other conditions influencing health status 11/18/2021    Encounter for procedure    Other diseases of tongue 04/28/2017    Tongue lesion    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    Other specified respiratory disorders 02/18/2019    Viral respiratory illness    Pain in left arm 11/22/2021    Pain of left upper extremity    Pain in right ankle and joints of right foot 09/23/2021    Right ankle pain    Pain in right wrist 06/08/2016    Right wrist pain    Pain in unspecified knee 09/30/2014    Joint pain, knee    Pain in unspecified  shoulder 10/27/2014    Shoulder pain    Pain in unspecified wrist 11/22/2021    Chronic wrist pain    Panic attacks     Personal history of diseases of the skin and subcutaneous tissue 08/12/2017    History of telogen effluvium    Personal history of diseases of the skin and subcutaneous tissue 12/30/2020    History of pilonidal cyst    Personal history of diseases of the skin and subcutaneous tissue 04/06/2015    History of contact dermatitis    Personal history of other (healed) physical injury and trauma 11/19/2018    History of insect bite    Personal history of other diseases of the digestive system 02/13/2020    History of gastroenteritis    Personal history of other diseases of the female genital tract 08/12/2017    History of dysmenorrhea    Personal history of other diseases of the musculoskeletal system and connective tissue 12/14/2017    History of low back pain    Personal history of other diseases of the musculoskeletal system and connective tissue 05/10/2022    History of muscle pain    Personal history of other diseases of the respiratory system 11/19/2021    History of sore throat    Personal history of other diseases of the respiratory system 01/17/2017    History of acute sinusitis    Personal history of other diseases of the respiratory system 02/18/2019    History of sore throat    Personal history of other diseases of the respiratory system 07/21/2014    History of pharyngitis    Personal history of other diseases of the respiratory system 01/07/2015    History of influenza    Personal history of other diseases of the respiratory system 01/17/2017    History of acute sinusitis    Personal history of other diseases of urinary system 02/12/2022    History of hematuria    Personal history of other infectious and parasitic diseases 07/21/2014    History of viral infection    Personal history of other specified conditions 02/12/2022    History of dysuria    Personal history of other specified  conditions 02/12/2022    History of dysuria    Personal history of other specified conditions 09/20/2021    History of insomnia    Personal history of other specified conditions 10/15/2021    History of urinary urgency    Personal history of other specified conditions 10/15/2021    History of urinary frequency    Personal history of other specified conditions 04/05/2021    History of weight loss    Personal history of other specified conditions 02/18/2019    History of fever    Personal history of other specified conditions 03/15/2021    History of nasal congestion    Personal history of other specified conditions 02/21/2020    History of diarrhea    Personal history of other specified conditions 12/01/2016    History of abdominal pain    Personal history of other specified conditions 01/07/2015    History of vomiting    Personal history of other specified conditions 01/07/2015    History of fever    PONV (postoperative nausea and vomiting)     WEARS PATCH    Premenstrual dysphoric disorder 01/25/2016    PMDD (premenstrual dysphoric disorder)    Raised antibody titer 02/24/2020    Elevated EBV antibody titer    Right lower quadrant pain 02/21/2020    Bilateral lower abdominal pain    Sprain of interphalangeal joint of right middle finger, initial encounter 03/06/2018    Sprain of interphalangeal joint of right middle finger, initial encounter    Strain of unspecified muscle(s) and tendon(s) at lower leg level, left leg, initial encounter 12/10/2018    Strain of left knee    Superficial mycosis, unspecified 10/17/2015    Fungal rash of trunk    Tic disorder, unspecified 11/01/2021    Tic disorder    TMJ (dislocation of temporomandibular joint)     Unspecified abdominal pain 12/01/2016    Abdominal pain, acute    Unspecified acute conjunctivitis, left eye 12/01/2016    Acute conjunctivitis, left eye    Unspecified conjunctivitis 11/13/2015    Conjunctivitis, left eye    Unspecified fracture of left wrist and hand,  initial encounter for closed fracture 12/29/2016    Fracture of left hand, closed, initial encounter    Unspecified injury of left wrist, hand and finger(s), initial encounter 12/03/2016    Injury of left hand, initial encounter    Unspecified injury of right lower leg, initial encounter 12/07/2015    Right knee injury    Unspecified injury of right wrist, hand and finger(s), initial encounter 03/28/2017    Injury of right hand, initial encounter    Unspecified injury of right wrist, hand and finger(s), initial encounter 03/06/2018    Injury of finger of right hand, initial encounter    Unspecified injury of unspecified foot, initial encounter 05/02/2015    Foot injury    Unspecified injury of unspecified wrist, hand and finger(s), initial encounter     Finger injury    Unspecified renal colic 02/12/2022    Ureteral colic    Vomiting, unspecified 02/25/2020    Intermittent vomiting        Past Surgical History   Past Surgical History:   Procedure Laterality Date    OTHER SURGICAL HISTORY  12/11/2020    Foot surgery    OTHER SURGICAL HISTORY  10/19/2020    Wrist surgery    OTHER SURGICAL HISTORY  03/25/2022    Temporomandibular joint surgery    OTHER SURGICAL HISTORY  09/16/2020    Surgery    OTHER SURGICAL HISTORY  05/07/2021    Arthrocentesis (Therapeutic)       Social History  Social History     Tobacco Use    Smoking status: Never    Smokeless tobacco: Never   Substance Use Topics    Alcohol use: Yes     Comment: social     Substance and Sexual Activity   Drug Use Never       Allergies  Tretinoin     Medications  Medications Prior to Admission   Medication Sig Dispense Refill Last Dose    ascorbic acid, vitamin C, 500 mg capsule Take 1 tablet by mouth once daily. TAKE PER DIRECTED   3/31/2024    B complex-vitamin C-folic acid (Nephro-Harley Rx) 1- mg-mg-mcg tablet Take 1 tablet by mouth. TAKE PER DIRECTED   3/31/2024    cholecalciferol (Vitamin D-3) 25 MCG (1000 UT) capsule Take 1 capsule (25 mcg) by mouth  once daily. TAKE PER DIRECTED   3/31/2024    cloNIDine ER (Kapvay) 0.1 mg tablet extended release 12 hr Take 2 tablets (0.2 mg) by mouth once daily at bedtime. 180 tablet 2 3/31/2024    FLUoxetine (PROzac) 40 mg capsule TAKE 1 CAPSULE BY MOUTH ONCE DAILY 90 capsule 2 3/31/2024    folic acid (Folvite) 1 mg tablet Take 1 tablet (1 mg) by mouth once daily. 6 DAYS PER WEEK. NOT TO TAKE ON DAY when takes methotrexate 90 tablet 3 3/31/2024    HYDROcodone-acetaminophen (Norco) 5-325 mg tablet Take 1 tablet by mouth every 6 hours if needed for severe pain (7 - 10). (Patient not taking: Reported on 3/11/2024) 10 tablet 0     methotrexate (Trexall) 2.5 mg tablet Take 6 tablets (15 mg total) by mouth 1 (one) time per week. 72 tablet 0 Past Week    mirtazapine (Remeron) 7.5 mg tablet TAKE 1 TABLET (7.5 MG) BY MOUTH ONCE DAILY AT BEDTIME. 90 tablet 2 3/31/2024    MULTIVITAMIN-FERROUS FUMARATE-FOLIC ACID 9 MG-200 MCG TABLET, HALF TABLET, (Centrum) Take 1 half tablet by mouth once daily.   3/31/2024    ondansetron ODT (Zofran-ODT) 4 mg disintegrating tablet Take 1 tablet (4 mg) by mouth every 8 hours if needed for nausea or vomiting. 20 tablet 0 Unknown    predniSONE (Deltasone) 10 mg tablet Take 1 tablet (10 mg) by mouth once daily. Take 4 pills once a day for 3 days then 3 pills once a day for 3 days then 2 pills once a day for 3 days then one pill once a day for 3 days then STOP 30 tablet 0 3/31/2024    rizatriptan (Maxalt) 10 mg tablet Take 1 tablet (10 mg) by mouth once daily as needed for migraine.   Unknown       Objective    Last Vitals  Temp Pulse Resp BP MAP O2 Sat   36.6 °C (97.9 °F) 85 16 126/79   99 %     Physical Examination  GENERAL: Examination reveals a well developed, well nourished, gravid female in no acute distress. She is alert and cooperative.  PSYCHOLOGICAL: awake and alert; oriented to person, place, and time    Lab Review  Lab Results   Component Value Date    WBC 8.0 04/04/2024    HGB 14.5 04/04/2024     HCT 43.0 04/04/2024     04/04/2024     EGD    Result Date: 4/4/2024  Table formatting from the original result was not included. Impression Bezoar in the body of the stomach Mild abnormal mucosa with plaque in the upper third of the esophagus and lower third of the esophagus; collected sample to send to pathology with brush The duodenum appeared normal. Performed random biopsy to rule out celiac disease. Findings Z-line 37 cm from the incisors Bezoar in the body of the stomach. Gastric mucosa appears to be normal, although limited evaluation due to retained food bezoar.  Gastric biopsy taken Mild, patchy abnormal mucosa with plaque in the upper third of the esophagus and lower third of the esophagus; collected sample to send to pathology with brush. Rule out Candida; The duodenum appeared normal. Performed random biopsy using biopsy forceps to rule out celiac disease. There was inadvertent mucosal trauma at D1 during navigation of gastroscope Recommendation  Await pathology results  N.p.o. for now. Follow brushing report Gastric emptying study as outpatient. Try to avoid opioid Laxative on DC  depending on insurance coverage  Indication Generalized abdominal pain Abdominal bloating.  Celiac serology negative.  SIBO negative. Altered bowel habit.  Suspect IBS. New diagnosis of lupus Colonoscopy yesterday: No evidence of IBD Staff Staff Role Migue Galdamez MD Proceduralist Medications See Anesthesia Record. Preprocedure A history and physical has been performed, and patient medication allergies have been reviewed. The patient's tolerance of previous anesthesia has been reviewed. The risks and benefits of the procedure and the sedation options and risks were discussed with the patient. All questions were answered and informed consent obtained. Details of the Procedure The patient underwent monitored anesthesia care, which was administered by an anesthesia professional. The patient's blood pressure, ECG,  ETCO2, heart rate, level of consciousness, oxygen and respirations were monitored throughout the procedure. The scope was introduced through the mouth and advanced to the second part of the duodenum. Retroflexion was performed in the cardia. Prior to the procedure, the patient's H. Pylori status was unknown. The patient's estimated blood loss was minimal (<5 mL). The procedure was not difficult. The patient tolerated the procedure well. There were no apparent adverse events. Events Procedure Events Event Event Time Specimens No specimens collected Procedure Location [place] [address] [place] [address] [phone] Referring Provider No referring provider defined for this encounter. Procedure Provider No name on file     Colonoscopy Diagnostic    Result Date: 4/3/2024  Table formatting from the original result was not included. Impression The terminal ileum, ileocecal valve and cecum appeared normal. Performed random biopsy using biopsy forceps. Findings All observed locations appeared normal, including the terminal ileum, ileocecal valve and cecum. Performed random biopsy using biopsy forceps. Suboptimal prep in the right colon;  Recommendation  Await pathology results  Next colonoscopy at the age of 47. Colonoscopy findings do not explain etiology of patient's abdominal pain/change in bowel habits. Symptomatic management  Indication Generalized abdominal pain Change in bowel habit-mostly loose Staff Staff Role Migue Galdamez MD Proceduralist Medications See Anesthesia Record. Preprocedure A history and physical has been performed, and patient medication allergies have been reviewed. The patient's tolerance of previous anesthesia has been reviewed. The risks and benefits of the procedure and the sedation options and risks were discussed with the patient. All questions were answered and informed consent obtained. Details of the Procedure The patient  underwent monitored anesthesia care, which was administered by an anesthesia professional. The patient's blood pressure, ECG, ETCO2, heart rate, level of consciousness, oxygen and respirations were monitored throughout the procedure. A digital rectal exam was performed. The scope was introduced through the anus and advanced to the terminal ileum. Bowel prep was not adequate. The patient's estimated blood loss was minimal (<5 mL). The procedure was not difficult. The patient tolerated the procedure well. There were no apparent adverse events. Suboptimal prep in right colon. Events Procedure Events Event Event Time ENDO SCOPE IN TIME 4/3/2024 11:32 AM ENDO CECUM REACHED 4/3/2024 11:45 AM ENDO SCOPE OUT TIME 4/3/2024 11:58 AM Specimens ID Type Source Tests Collected by Time 1 : TERMINAL ILEUM RULE OUT CROHNS Tissue TERMINAL ILEUM BIOPSY SURGICAL PATHOLOGY EXAM Migue Galdamez MD 4/3/2024 1153 2 : RANDOM COLON BIOPSY Tissue COLON  - RANDOM BIOPSY SURGICAL PATHOLOGY EXAM Migue Galdamez MD 4/3/2024 1156 Procedure Location [address] [address] [phone] Referring Provider No referring provider defined for this encounter. Procedure Provider No name on file     ECG 12 lead    Result Date: 4/2/2024  Normal sinus rhythm with sinus arrhythmia Normal ECG When compared with ECG of 24-NOV-2014 09:11, PREVIOUS ECG IS PRESENT See ED provider note for full interpretation and clinical correlation Confirmed by Niki Mello ([id number]) on 4/2/2024 2:03:29 PM    CT abdomen pelvis w IV contrast    Result Date: 3/31/2024  Interpreted By:  Dion Bowling, STUDY: CT ABDOMEN PELVIS W IV CONTRAST;  3/31/2024 10:43 pm   INDICATION: Signs/Symptoms:abd pain and distended abd. History of Lupus..   COMPARISON: 3/1/2024   ACCESSION NUMBER(S): [id number]   ORDERING CLINICIAN: AMBERLY ROMO   TECHNIQUE: Contiguous axial images of the abdomen and  pelvis were obtained after the intravenous administration of  contrast. Coronal and sagittal reformatted images were obtained from the axial images.   FINDINGS: No basilar airspace disease. No pleural effusion.   No evidence of liver mass. The gallbladder is contracted and not well evaluated. No dilatation common bile duct.   The pancreas, spleen, and adrenal glands appear unremarkable.   Symmetric enhancement of the kidneys. No hydronephrosis.   No evidence of bowel obstruction or acute appendicitis.   Urinary bladder is underdistended and not well evaluated.   Limited evaluation of the uterus and adnexa. Minimal trace fluid in the pelvis.   No acute fracture of the lumbar spine.       No evidence of acute abnormality of the abdominal viscera.   No evidence of bowel obstruction or acute appendicitis.   MACRO: None   Signed by: Dion Bowling 3/31/2024 11:18 PM Dictation workstation:   [id number]      Assessment/Plan   25 yo G0 female with generalized abdominal pain.  No findings to support acute GYN etiology.  Long discussion with pt. And mother regarding potential etiologies.  Given minimal pelvic pain at this time, no bleeding or change in discharge and imaging not supportive of reproductive organ abnormality, would recommend further GYN management to be continued as an outpatient.

## 2024-04-05 VITALS
HEIGHT: 69 IN | OXYGEN SATURATION: 99 % | HEART RATE: 85 BPM | BODY MASS INDEX: 23.7 KG/M2 | TEMPERATURE: 97.3 F | SYSTOLIC BLOOD PRESSURE: 116 MMHG | WEIGHT: 160 LBS | RESPIRATION RATE: 18 BRPM | DIASTOLIC BLOOD PRESSURE: 87 MMHG

## 2024-04-05 LAB
BASOPHILS # BLD AUTO: 0.03 X10*3/UL (ref 0–0.1)
BASOPHILS NFR BLD AUTO: 0.4 %
EOSINOPHIL # BLD AUTO: 0.06 X10*3/UL (ref 0–0.7)
EOSINOPHIL NFR BLD AUTO: 0.8 %
ERYTHROCYTE [DISTWIDTH] IN BLOOD BY AUTOMATED COUNT: 13.2 % (ref 11.5–14.5)
HCT VFR BLD AUTO: 39.6 % (ref 36–46)
HGB BLD-MCNC: 13.1 G/DL (ref 12–16)
HOLD SPECIMEN: NORMAL
IMM GRANULOCYTES # BLD AUTO: 0.02 X10*3/UL (ref 0–0.7)
IMM GRANULOCYTES NFR BLD AUTO: 0.3 % (ref 0–0.9)
LYMPHOCYTES # BLD AUTO: 3.27 X10*3/UL (ref 1.2–4.8)
LYMPHOCYTES NFR BLD AUTO: 42.4 %
MCH RBC QN AUTO: 28.6 PG (ref 26–34)
MCHC RBC AUTO-ENTMCNC: 33.1 G/DL (ref 32–36)
MCV RBC AUTO: 87 FL (ref 80–100)
MONOCYTES # BLD AUTO: 0.69 X10*3/UL (ref 0.1–1)
MONOCYTES NFR BLD AUTO: 8.9 %
NEUTROPHILS # BLD AUTO: 3.64 X10*3/UL (ref 1.2–7.7)
NEUTROPHILS NFR BLD AUTO: 47.2 %
NRBC BLD-RTO: 0 /100 WBCS (ref 0–0)
PLATELET # BLD AUTO: 232 X10*3/UL (ref 150–450)
RBC # BLD AUTO: 4.58 X10*6/UL (ref 4–5.2)
WBC # BLD AUTO: 7.7 X10*3/UL (ref 4.4–11.3)

## 2024-04-05 PROCEDURE — 2500000001 HC RX 250 WO HCPCS SELF ADMINISTERED DRUGS (ALT 637 FOR MEDICARE OP): Performed by: NURSE PRACTITIONER

## 2024-04-05 PROCEDURE — 2500000001 HC RX 250 WO HCPCS SELF ADMINISTERED DRUGS (ALT 637 FOR MEDICARE OP)

## 2024-04-05 PROCEDURE — 99232 SBSQ HOSP IP/OBS MODERATE 35: CPT | Performed by: NURSE PRACTITIONER

## 2024-04-05 PROCEDURE — 36415 COLL VENOUS BLD VENIPUNCTURE: CPT | Performed by: STUDENT IN AN ORGANIZED HEALTH CARE EDUCATION/TRAINING PROGRAM

## 2024-04-05 PROCEDURE — 99239 HOSP IP/OBS DSCHRG MGMT >30: CPT | Performed by: INTERNAL MEDICINE

## 2024-04-05 PROCEDURE — 85025 COMPLETE CBC W/AUTO DIFF WBC: CPT | Performed by: STUDENT IN AN ORGANIZED HEALTH CARE EDUCATION/TRAINING PROGRAM

## 2024-04-05 PROCEDURE — 2500000004 HC RX 250 GENERAL PHARMACY W/ HCPCS (ALT 636 FOR OP/ED): Performed by: FAMILY MEDICINE

## 2024-04-05 PROCEDURE — 2500000004 HC RX 250 GENERAL PHARMACY W/ HCPCS (ALT 636 FOR OP/ED): Performed by: STUDENT IN AN ORGANIZED HEALTH CARE EDUCATION/TRAINING PROGRAM

## 2024-04-05 RX ORDER — HYDROCODONE BITARTRATE AND ACETAMINOPHEN 5; 325 MG/1; MG/1
1 TABLET ORAL EVERY 6 HOURS PRN
Qty: 20 TABLET | Refills: 0 | Status: SHIPPED | OUTPATIENT
Start: 2024-04-05 | End: 2024-04-24 | Stop reason: ALTCHOICE

## 2024-04-05 RX ORDER — DROSPIRENONE 4 MG/1
4 TABLET, FILM COATED ORAL DAILY
Qty: 30 TABLET | Refills: 2 | Status: SHIPPED | OUTPATIENT
Start: 2024-04-05 | End: 2024-07-04

## 2024-04-05 RX ADMIN — FOLIC ACID 1 MG: 1 TABLET ORAL at 08:06

## 2024-04-05 RX ADMIN — FLUOXETINE HYDROCHLORIDE 40 MG: 20 CAPSULE ORAL at 08:05

## 2024-04-05 RX ADMIN — LINACLOTIDE 288 MCG: 72 CAPSULE, GELATIN COATED ORAL at 08:05

## 2024-04-05 RX ADMIN — HYDROMORPHONE HYDROCHLORIDE 2 MG: 1 INJECTION, SOLUTION INTRAMUSCULAR; INTRAVENOUS; SUBCUTANEOUS at 05:48

## 2024-04-05 RX ADMIN — HYDROMORPHONE HYDROCHLORIDE 2 MG: 1 INJECTION, SOLUTION INTRAMUSCULAR; INTRAVENOUS; SUBCUTANEOUS at 09:45

## 2024-04-05 RX ADMIN — ONDANSETRON 4 MG: 2 INJECTION INTRAMUSCULAR; INTRAVENOUS at 09:48

## 2024-04-05 ASSESSMENT — PAIN DESCRIPTION - LOCATION: LOCATION: ABDOMEN

## 2024-04-05 ASSESSMENT — PAIN - FUNCTIONAL ASSESSMENT
PAIN_FUNCTIONAL_ASSESSMENT: 0-10

## 2024-04-05 ASSESSMENT — PAIN SCALES - GENERAL
PAINLEVEL_OUTOF10: 7

## 2024-04-05 NOTE — PROGRESS NOTES
04/05/24 1412   Discharge Planning   Living Arrangements Spouse/significant other   Support Systems Spouse/significant other   Assistance Needed Alert and oriented x 3, Independent with ADL's, Drives, No DME, Student, and is employed   Type of Residence Private residence   Do you have animals or pets at home? Yes   Type of Animals or Pets 1 dog   Who is requesting discharge planning? Provider   Home or Post Acute Services None   Patient expects to be discharged to: Home with significant other with no discharge needs identified   Does the patient need discharge transport arranged? No   Patient Choice   Provider Choice list and CMS website (https://medicare.gov/care-compare#search) for post-acute Quality and Resource Measure Data were provided and reviewed with: Patient   Patient / Family choosing to utilize agency / facility established prior to hospitalization No

## 2024-04-05 NOTE — PROGRESS NOTES
"Allyssa Kelly is a 24 y.o. female on day 2 of admission presenting with Abdominal pain.    Subjective   Ongoing abdominal pain but was able to eat. Had a BM last evening.        Objective     Physical Exam  General: Alert and awake, NAD  HENT: normocephalic, PERRL, anicteric  Skin: no jaundice, intact   RESP: Nonlabored on RA  CARD: RRR  GI: soft, mild distension, mild diffuse tenderness, no ascites  Extremities: no edema  Neuro: A&Ox3, no asterixis  Psych: Calm and cooperative   Last Recorded Vitals  Blood pressure 116/87, pulse 85, temperature 36.3 °C (97.3 °F), temperature source Temporal, resp. rate 18, height 1.753 m (5' 9\"), weight 72.6 kg (160 lb), SpO2 99 %.  Intake/Output last 3 Shifts:  I/O last 3 completed shifts:  In: 1950 (26.9 mL/kg) [P.O.:1650; IV Piggyback:300]  Out: - (0 mL/kg)   Weight: 72.6 kg     Relevant Results  Scheduled medications  cloNIDine, 0.2 mg, oral, Nightly  drospirenone (contraceptive), 4 mg, oral, Daily  FLUoxetine, 40 mg, oral, Daily  folic acid, 1 mg, oral, Once per day on Sun Tue Wed Thu Fri Sat  linaCLOtide, 290 mcg, oral, Daily before breakfast  methotrexate, 15 mg, oral, Weekly  mirtazapine, 7.5 mg, oral, Nightly  ondansetron, 8 mg, intravenous, Once  ondansetron, 8 mg, intravenous, Once      Continuous medications  lactated Ringer's, 100 mL/hr      PRN medications  PRN medications: acetaminophen **OR** acetaminophen **OR** acetaminophen, acetaminophen, acetaminophen, albuterol, albuterol, HYDROmorphone, HYDROmorphone, HYDROmorphone, HYDROmorphone, HYDROmorphone, HYDROmorphone, ketorolac, ondansetron, polyethylene glycol, promethazine (Phenergan) 6.25 mg in sodium chloride 0.9% 50 mL IV, promethazine (Phenergan) 6.25 mg in sodium chloride 0.9% 50 mL IV, SUMAtriptan    Results for orders placed or performed during the hospital encounter of 03/31/24 (from the past 24 hour(s))   PST Top   Result Value Ref Range    Extra Tube Hold for add-ons.    CBC and Auto Differential "   Result Value Ref Range    WBC 7.7 4.4 - 11.3 x10*3/uL    nRBC 0.0 0.0 - 0.0 /100 WBCs    RBC 4.58 4.00 - 5.20 x10*6/uL    Hemoglobin 13.1 12.0 - 16.0 g/dL    Hematocrit 39.6 36.0 - 46.0 %    MCV 87 80 - 100 fL    MCH 28.6 26.0 - 34.0 pg    MCHC 33.1 32.0 - 36.0 g/dL    RDW 13.2 11.5 - 14.5 %    Platelets 232 150 - 450 x10*3/uL    Neutrophils % 47.2 40.0 - 80.0 %    Immature Granulocytes %, Automated 0.3 0.0 - 0.9 %    Lymphocytes % 42.4 13.0 - 44.0 %    Monocytes % 8.9 2.0 - 10.0 %    Eosinophils % 0.8 0.0 - 6.0 %    Basophils % 0.4 0.0 - 2.0 %    Neutrophils Absolute 3.64 1.20 - 7.70 x10*3/uL    Immature Granulocytes Absolute, Automated 0.02 0.00 - 0.70 x10*3/uL    Lymphocytes Absolute 3.27 1.20 - 4.80 x10*3/uL    Monocytes Absolute 0.69 0.10 - 1.00 x10*3/uL    Eosinophils Absolute 0.06 0.00 - 0.70 x10*3/uL    Basophils Absolute 0.03 0.00 - 0.10 x10*3/uL        EGD    Result Date: 4/4/2024  Table formatting from the original result was not included. Impression Bezoar in the body of the stomach Mild abnormal mucosa with plaque in the upper third of the esophagus and lower third of the esophagus; collected sample to send to pathology with brush The duodenum appeared normal. Performed random biopsy to rule out celiac disease. Findings Z-line 37 cm from the incisors Bezoar in the body of the stomach. Gastric mucosa appears to be normal, although limited evaluation due to retained food bezoar.  Gastric biopsy taken Mild, patchy abnormal mucosa with plaque in the upper third of the esophagus and lower third of the esophagus; collected sample to send to pathology with brush. Rule out Candida; The duodenum appeared normal. Performed random biopsy using biopsy forceps to rule out celiac disease. There was inadvertent mucosal trauma at D1 during navigation of gastroscope Recommendation  Await pathology results  N.p.o. for now. Follow brushing report Gastric emptying study as outpatient. Try to avoid opioid Laxative on DC   depending on insurance coverage  Indication Generalized abdominal pain Abdominal bloating.  Celiac serology negative.  SIBO negative. Altered bowel habit.  Suspect IBS. New diagnosis of lupus Colonoscopy yesterday: No evidence of IBD Staff Staff Role Migue Galdamez MD Proceduralist Medications See Anesthesia Record. Preprocedure A history and physical has been performed, and patient medication allergies have been reviewed. The patient's tolerance of previous anesthesia has been reviewed. The risks and benefits of the procedure and the sedation options and risks were discussed with the patient. All questions were answered and informed consent obtained. Details of the Procedure The patient underwent monitored anesthesia care, which was administered by an anesthesia professional. The patient's blood pressure, ECG, ETCO2, heart rate, level of consciousness, oxygen and respirations were monitored throughout the procedure. The scope was introduced through the mouth and advanced to the second part of the duodenum. Retroflexion was performed in the cardia. Prior to the procedure, the patient's H. Pylori status was unknown. The patient's estimated blood loss was minimal (<5 mL). The procedure was not difficult. The patient tolerated the procedure well. There were no apparent adverse events. Events Procedure Events Event Event Time Specimens No specimens collected Procedure Location [address] [address] [phone] Referring Provider No referring provider defined for this encounter. Procedure Provider No name on file     Colonoscopy Diagnostic    Result Date: 4/3/2024  Table formatting from the original result was not included. Impression The terminal ileum, ileocecal valve and cecum appeared normal. Performed random biopsy using biopsy forceps. Findings All observed locations appeared normal, including the terminal ileum, ileocecal valve and cecum.  Performed random biopsy using biopsy forceps. Suboptimal prep in the right colon;  Recommendation  Await pathology results  Next colonoscopy at the age of 47. Colonoscopy findings do not explain etiology of patient's abdominal pain/change in bowel habits. Symptomatic management  Indication Generalized abdominal pain Change in bowel habit-mostly loose Staff Staff Role Migue Galdamez MD Proceduralist Medications See Anesthesia Record. Preprocedure A history and physical has been performed, and patient medication allergies have been reviewed. The patient's tolerance of previous anesthesia has been reviewed. The risks and benefits of the procedure and the sedation options and risks were discussed with the patient. All questions were answered and informed consent obtained. Details of the Procedure The patient underwent monitored anesthesia care, which was administered by an anesthesia professional. The patient's blood pressure, ECG, ETCO2, heart rate, level of consciousness, oxygen and respirations were monitored throughout the procedure. A digital rectal exam was performed. The scope was introduced through the anus and advanced to the terminal ileum. Bowel prep was not adequate. The patient's estimated blood loss was minimal (<5 mL). The procedure was not difficult. The patient tolerated the procedure well. There were no apparent adverse events. Suboptimal prep in right colon. Events Procedure Events Event Event Time ENDO SCOPE IN TIME 4/3/2024 11:32 AM ENDO CECUM REACHED 4/3/2024 11:45 AM ENDO SCOPE OUT TIME 4/3/2024 11:58 AM Specimens ID Type Source Tests Collected by Time 1 : TERMINAL ILEUM RULE OUT CROHNS Tissue TERMINAL ILEUM BIOPSY SURGICAL PATHOLOGY EXAM Migue Galdamez MD 4/3/2024 1153 2 : RANDOM COLON BIOPSY Tissue COLON  - RANDOM BIOPSY SURGICAL PATHOLOGY EXAM Migue Galdamez MD 4/3/2024 1156 Procedure Location [facility] [address] [facility] [address]  [facility] [address] [phone] Referring Provider No referring provider defined for this encounter. Procedure Provider No name on file     ECG 12 lead    Result Date: 4/2/2024  Normal sinus rhythm with sinus arrhythmia Normal ECG When compared with ECG of 24-NOV-2014 09:11, PREVIOUS ECG IS PRESENT See ED provider note for full interpretation and clinical correlation Confirmed by Niki Mello ([id number]) on 4/2/2024 2:03:29 PM    CT abdomen pelvis w IV contrast    Result Date: 3/31/2024  Interpreted By:  Dion Bowling, STUDY: CT ABDOMEN PELVIS W IV CONTRAST;  3/31/2024 10:43 pm   INDICATION: Signs/Symptoms:abd pain and distended abd. History of Lupus..   COMPARISON: 3/1/2024   ACCESSION NUMBER(S): [id number]   ORDERING CLINICIAN: AMBERLY ROMO   TECHNIQUE: Contiguous axial images of the abdomen and pelvis were obtained after the intravenous administration of  contrast. Coronal and sagittal reformatted images were obtained from the axial images.   FINDINGS: No basilar airspace disease. No pleural effusion.   No evidence of liver mass. The gallbladder is contracted and not well evaluated. No dilatation common bile duct.   The pancreas, spleen, and adrenal glands appear unremarkable.   Symmetric enhancement of the kidneys. No hydronephrosis.   No evidence of bowel obstruction or acute appendicitis.   Urinary bladder is underdistended and not well evaluated.   Limited evaluation of the uterus and adnexa. Minimal trace fluid in the pelvis.   No acute fracture of the lumbar spine.       No evidence of acute abnormality of the abdominal viscera.   No evidence of bowel obstruction or acute appendicitis.   MACRO: None   Signed by: Dion Bowling 3/31/2024 11:18 PM Dictation workstation:   [id number]    US PELVIS TRANSABDOMINAL WITH TRANSVAGINAL    Result Date: 3/14/2024  STUDY: Pelvic Ultrasound; 3/14/2024 11:14 AM INDICATION: Pelvic pain, intermittently since 2/2024.  Additional History:  LMP 11/2023.  IUD placed  6/2023. COMPARISON: US Pelvis 2/29/2024, CT A/P 3/1/2024, 2/29/2024. ACCESSION NUMBER(S): [id number] ORDERING CLINICIAN: IVANA CASTILLO TECHNIQUE: Transabdominal and transvaginal ultrasonography of the pelvis was performed with spectral Doppler evaluation of the ovaries. FINDINGS: UTERUS:   The uterus is retroverted in position and measures 6.0 x 3.0 x 4.3 cm.  The uterus demonstrates a normal, homogeneous echotexture.  There are no discrete fibroids present.  ENDOMETRIUM: The endometrium measures 0.2 cm.  IUD is identified within the endometrium, questionably situated low in the uterus.  Trace free fluid is noted within the endometrium. RIGHT OVARY: The right ovary measures 4.0 x 1.7 x 2.8 cm.  The right ovary demonstrates a normal echotexture.  Spectral Doppler evaluation of the ovary was performed.  Normal color and spectral Doppler flow is visualized.  LEFT OVARY: The left ovary measures 3.3 x 1.5 x 2.0 cm.  The left ovary demonstrates a normal echotexture.  Spectral Doppler evaluation of the ovary was performed.  Normal color and spectral Doppler flow is visualized.   OTHER: No significant fluid is present within the cul-de-sac.    Unremarkable ultrasound of the pelvis. Normal color and spectral Doppler flow within both ovaries. IUD is identified within the endometrium, questionably situated low in the uterus. Signed by Triston Méndez MD    * Cannot find OR log *  Last relevant procedure: Colonoscopy on 4/3/24                         Assessment/Plan   Principal Problem:    Abdominal pain  Active Problems:    Generalized abdominal pain    24 year old female admitted with abdominal pain, bloating. H Pylori, celiac and SIBO testing negative on outpatient workup. Recent CT noted constipation. Colonoscopy normal, did note stool in right colon. EGD with food bezoar, possible candidiasis. Tolerating breakfast this morning. Etiology of pain and bloating is unclear, question if could be related to methotrexate.       -Follow up biopsies/brushing   -Diet as tolerated   -Linzess 290mcg daily for IBS-C   -Monitor stool output  -Avoid dairy   -Would limit NSAIDs, steroids and Narcotics as able  -Follow up with Dr Galdamez, [facility] as outpatient      Ok for discharge from GI perspective.    Please contact with any questions.      Discussed with Dr Galdamez, who is in agreement.     Fani Rubio, CNP

## 2024-04-05 NOTE — NURSING NOTE
Pt to be discharged, discharge instructions given and reviewed with pt. Pt demonstrating understanding.

## 2024-04-05 NOTE — DISCHARGE SUMMARY
Discharge Diagnosis  Abdominal pain    Issues Requiring Follow-Up  Follow up with rheumatology    Discharge Meds     Your medication list        ASK your doctor about these medications        Instructions Last Dose Given Next Dose Due   ascorbic acid (vitamin C) 500 mg capsule           B complex-vitamin C-folic acid 1- mg-mg-mcg tablet  Commonly known as: Nephro-Harley Rx           cholecalciferol 25 MCG (1000 UT) capsule  Commonly known as: Vitamin D-3           cloNIDine ER 0.1 mg tablet extended release 12 hr  Commonly known as: Kapvay      Take 2 tablets (0.2 mg) by mouth once daily at bedtime.       FLUoxetine 40 mg capsule  Commonly known as: PROzac      TAKE 1 CAPSULE BY MOUTH ONCE DAILY       folic acid 1 mg tablet  Commonly known as: Folvite      Take 1 tablet (1 mg) by mouth once daily. 6 DAYS PER WEEK. NOT TO TAKE ON DAY when takes methotrexate       HYDROcodone-acetaminophen 5-325 mg tablet  Commonly known as: Norco      Take 1 tablet by mouth every 6 hours if needed for severe pain (7 - 10).       methotrexate 2.5 mg tablet  Commonly known as: Trexall      Take 6 tablets (15 mg total) by mouth 1 (one) time per week.       mirtazapine 7.5 mg tablet  Commonly known as: Remeron      TAKE 1 TABLET (7.5 MG) BY MOUTH ONCE DAILY AT BEDTIME.       MULTIVITAMIN-FERROUS FUMARATE-FOLIC ACID 9 MG-200 MCG TABLET (HALF TABLET)  Commonly known as: Centrum           ondansetron ODT 4 mg disintegrating tablet  Commonly known as: Zofran-ODT      Take 1 tablet (4 mg) by mouth every 8 hours if needed for nausea or vomiting.       predniSONE 10 mg tablet  Commonly known as: Deltasone      Take 1 tablet (10 mg) by mouth once daily. Take 4 pills once a day for 3 days then 3 pills once a day for 3 days then 2 pills once a day for 3 days then one pill once a day for 3 days then STOP       rizatriptan 10 mg tablet  Commonly known as: Maxalt                    Test Results Pending At Discharge  Pending Labs       Order  Current Status    Cytology Consultation (Non-Gynecologic) In process    Surgical Pathology Exam In process    Surgical Pathology Exam In process            Hospital Course   25 yo F with a hx of IBS and lupus on methotrexate, who was admitted on 4/1 with generalized abdominal pain. On admission imaging was neg for acute findings. GI consulted and she was taken for endoscopy on 4/4 which did not demonstrate any pathology. Biopsy taken, still pending. Gyn consulted and felt no acute gynecologic process either. She endorsed that her pain got worse after starting her methotrexate 2 weeks ago. Case discussed with her rheumatologist who agreed that her pain might be 2/2 methotrexate. She was recommended to stop and follow up with rheumatology next week. She was given a script for hydrocodone/tylenol and discharged to home ins table condition.     Pertinent Physical Exam At Time of Discharge  Physical Exam  Constitutional:       General: She is not in acute distress.  HENT:      Head: Normocephalic.   Eyes:      Extraocular Movements: Extraocular movements intact.   Cardiovascular:      Pulses: Normal pulses.   Pulmonary:      Effort: Pulmonary effort is normal. No respiratory distress.   Abdominal:      General: Abdomen is flat. There is no distension.   Musculoskeletal:      Right lower leg: No edema.      Left lower leg: No edema.   Skin:     Coloration: Skin is not jaundiced or pale.   Neurological:      General: No focal deficit present.      Mental Status: She is alert and oriented to person, place, and time.   Psychiatric:         Mood and Affect: Mood normal.         Thought Content: Thought content normal.         Outpatient Follow-Up  Future Appointments   Date Time Provider Department Center   7/24/2024  3:40 PM Betsy Boateng MD [id number] Derian Swanson DO

## 2024-04-05 NOTE — CARE PLAN
Problem: Pain  Goal: Takes deep breaths with improved pain control throughout the shift  Outcome: Met  Goal: Turns in bed with improved pain control throughout the shift  Outcome: Met  Goal: Free from opioid side effects throughout the shift  Outcome: Met  Goal: Free from acute confusion related to pain meds throughout the shift  Outcome: Met

## 2024-04-05 NOTE — CARE PLAN
The patient's goals for the shift include      The clinical goals for the shift include to remain comfortable during shift    Over the shift, the patient did not make progress toward the following goals. Barriers to progression include ***. Recommendations to address these barriers include ***.

## 2024-04-08 ENCOUNTER — PATIENT OUTREACH (OUTPATIENT)
Dept: CARE COORDINATION | Facility: CLINIC | Age: 24
End: 2024-04-08
Payer: COMMERCIAL

## 2024-04-08 ENCOUNTER — TELEPHONE (OUTPATIENT)
Dept: RHEUMATOLOGY | Facility: CLINIC | Age: 24
End: 2024-04-08
Payer: COMMERCIAL

## 2024-04-08 LAB
LABORATORY COMMENT REPORT: NORMAL
LABORATORY COMMENT REPORT: NORMAL
PATH REPORT.FINAL DX SPEC: NORMAL
PATH REPORT.GROSS SPEC: NORMAL
PATH REPORT.TOTAL CANCER: NORMAL

## 2024-04-08 NOTE — SIGNIFICANT EVENT
Follow Up Phone Call    Outgoing phone call    Spoke to: Allyssa Kelly Relationship:self   Phone number: [phone]      Outcome: No answer/busy signal   Chief Complaint   Patient presents with    Abdominal Pain     All 4 quadrants since yesterday, Endorses nausea, diarrhea, bloating. Denies vomiting, constipation, pregnancy. Denies any abdominal surgical hx          Diagnosis:Not applicable    Unable to contact patient. Voice mailbox full.      \    
show

## 2024-04-08 NOTE — TELEPHONE ENCOUNTER
Called patient and discussed with patient.  Since she has been off the methotrexate little by little it is getting better.  Will continue her off any medication.  In the future can consider other medicine such as azathioprine and CellCept.  Has not had any issues with swelling, urticaria or angioedema.

## 2024-04-08 NOTE — CARE PLAN
Problem: Access to Care Issue  Goal: Assess and Address Access Barriers  Intervention: Confirm adherence with follow up care (labs, tests, studies) and scheduled appointments per provider/specialist instruction  Note: Attempted outreach post hospital DC.  Left msg on VM, provided my contact information for return call.

## 2024-04-10 LAB
LABORATORY COMMENT REPORT: NORMAL
PATH REPORT.FINAL DX SPEC: NORMAL
PATH REPORT.GROSS SPEC: NORMAL
PATH REPORT.TOTAL CANCER: NORMAL

## 2024-04-11 LAB
LABORATORY COMMENT REPORT: NORMAL
PATH REPORT.FINAL DX SPEC: NORMAL
PATH REPORT.GROSS SPEC: NORMAL
PATH REPORT.RELEVANT HX SPEC: NORMAL
PATH REPORT.TOTAL CANCER: NORMAL

## 2024-04-12 ENCOUNTER — PATIENT OUTREACH (OUTPATIENT)
Dept: CARE COORDINATION | Facility: CLINIC | Age: 24
End: 2024-04-12
Payer: COMMERCIAL

## 2024-04-12 NOTE — CARE PLAN
See below; continue to follow.       Problem: Access to Care Issue  Goal: Assess and Address Access Barriers  Intervention: Confirm adherence with follow up care (labs, tests, studies) and scheduled appointments per provider/specialist instruction  Note: Brief chart review conducted prior to outreach call.  Spoke ingrid Allyssa who reports she is feeling better since return home and has not required pain meds for the last several days.  Post DC assessment completed.  Scheduled PCP hospital follow-up visit and contacted Dr. JONATHAN Galdamez's office [phone]), left detailed VM that Alylssa needs a post-DC visit; please contact her directly with appt.  Pt notified to expect call from office.  Will plan to speak ingrid Allyssa again in several weeks and encouraged to call for issues or concerns that may arise in the interim; agreeable.

## 2024-04-22 ENCOUNTER — PATIENT MESSAGE (OUTPATIENT)
Dept: RHEUMATOLOGY | Facility: CLINIC | Age: 24
End: 2024-04-22
Payer: COMMERCIAL

## 2024-04-22 DIAGNOSIS — B37.81 CANDIDA ESOPHAGITIS (MULTI): Primary | ICD-10-CM

## 2024-04-22 RX ORDER — FLUCONAZOLE 100 MG/1
100 TABLET ORAL DAILY
Qty: 11 TABLET | Refills: 0 | Status: SHIPPED | OUTPATIENT
Start: 2024-04-22 | End: 2024-05-03

## 2024-04-23 NOTE — PROGRESS NOTES
Subjective   Chief Complaint   Patient presents with    Hospital Follow-up       Patient ID: Allyssa Kelly is a 24 y.o. female who presents virtually for Hospital Follow-up.    HPI  Allyssa is a 25 yo female presenting virtually today for hospital d/c follow up    I performed this visit using real-time telehealth tools, including an audio/video connection between Allyssa Kelly  and myself, Yanelis hCacko CNP,  within the [facility].  Consent has been obtained for this visit.  I have verbally confirmed with Allyssa Kelly (or parent if under 18) that they are physically located in the [facility] during this virtual visit.  Telemedicine appropriate evaluation completed.  Unable to perform complete physical exam due to virtual visit.      Dx: IBS and lupus     Pt reports having gone to ED x 5 since Feb 2023, with 2 admissions  Pt had been experiencing abdominal bloating/distention   Pt had upper and lower GI ---> negative biopsies  Pt was instructed to stop methotrexate per GYN and see if her symptoms improved   Pt states her symptoms have essentially resolved since stopping Methotrexate 2 weeks ago, bloating has almost completely resolved.  Feels well, no acute c/o today  Has f/u with Dr. Boateng in July        Allergies   Allergen Reactions    Methotrexate Other     Abdominal bloating/distention     Tretinoin Rash       Review of Systems  ROS was completed and all systems are negative with the exception of what was noted in the the HPI.       Objective   There were no vitals taken for this visit.     Current Outpatient Medications   Medication Instructions    ascorbic acid, vitamin C, 500 mg capsule 1 tablet, oral, Daily, TAKE PER DIRECTED    B complex-vitamin C-folic acid (Nephro-Harley Rx) 1- mg-mg-mcg tablet 1 tablet, oral, TAKE PER DIRECTED    cholecalciferol (VITAMIN D-3) 25 mcg, oral, Daily, TAKE PER DIRECTED    cloNIDine ER (KAPVAY) 0.2 mg, oral, Nightly    drospirenone,  contraceptive, (Slynd) 4 mg (28) tablet 1 tablet, oral, Daily    fluconazole (DIFLUCAN) 100 mg, oral, Daily, 2 tab in Day 1 then 1 tab daily for 09 days    FLUoxetine (PROzac) 40 mg capsule TAKE 1 CAPSULE BY MOUTH ONCE DAILY    mirtazapine (REMERON) 7.5 mg, oral, Nightly    MULTIVITAMIN-FERROUS FUMARATE-FOLIC ACID 9 MG-200 MCG TABLET, HALF TABLET, (Centrum) 1 tablet, oral, Daily    ondansetron ODT (ZOFRAN-ODT) 4 mg, oral, Every 8 hours PRN    rizatriptan (MAXALT) 10 mg, oral, Daily PRN         Physical Exam  Pulmonary:      Effort: Pulmonary effort is normal.   Neurological:      Mental Status: She is alert and oriented to person, place, and time.   Psychiatric:         Mood and Affect: Mood normal.         Thought Content: Thought content normal.         Judgment: Judgment normal.         Assessment/Plan   Problem List Items Addressed This Visit             ICD-10-CM    Systemic lupus erythematosus (Multi) M32.9     DC'd methotrexate d/t abd distention in April 2024  Tx per Dr. Boateng           Other Visit Diagnoses         Codes    Hospital discharge follow-up    -  Primary Z09

## 2024-04-24 ENCOUNTER — TELEMEDICINE (OUTPATIENT)
Dept: PRIMARY CARE | Facility: CLINIC | Age: 24
End: 2024-04-24
Payer: COMMERCIAL

## 2024-04-24 DIAGNOSIS — Z09 HOSPITAL DISCHARGE FOLLOW-UP: Primary | ICD-10-CM

## 2024-04-24 DIAGNOSIS — M32.9 SYSTEMIC LUPUS ERYTHEMATOSUS, UNSPECIFIED SLE TYPE, UNSPECIFIED ORGAN INVOLVEMENT STATUS (MULTI): ICD-10-CM

## 2024-04-24 DIAGNOSIS — K30 DELAYED GASTRIC EMPTYING: ICD-10-CM

## 2024-04-24 PROBLEM — R31.9 HEMATURIA: Status: RESOLVED | Noted: 2023-05-02 | Resolved: 2024-04-24

## 2024-04-24 PROBLEM — R11.2 PONV (POSTOPERATIVE NAUSEA AND VOMITING): Status: RESOLVED | Noted: 2024-04-04 | Resolved: 2024-04-24

## 2024-04-24 PROBLEM — L30.9 DERMATITIS: Status: RESOLVED | Noted: 2023-05-02 | Resolved: 2024-04-24

## 2024-04-24 PROBLEM — G43.909 MIGRAINE HEADACHE: Status: RESOLVED | Noted: 2023-05-02 | Resolved: 2024-04-24

## 2024-04-24 PROBLEM — R10.9 ABDOMINAL PAIN: Status: RESOLVED | Noted: 2024-04-01 | Resolved: 2024-04-24

## 2024-04-24 PROBLEM — E87.1 HYPONATREMIA: Status: RESOLVED | Noted: 2024-04-04 | Resolved: 2024-04-24

## 2024-04-24 PROBLEM — R10.84 GENERALIZED ABDOMINAL PAIN: Status: RESOLVED | Noted: 2024-04-03 | Resolved: 2024-04-24

## 2024-04-24 PROBLEM — Z98.890 PONV (POSTOPERATIVE NAUSEA AND VOMITING): Status: RESOLVED | Noted: 2024-04-04 | Resolved: 2024-04-24

## 2024-04-24 PROBLEM — Z86.69 HISTORY OF MIGRAINE HEADACHES: Status: RESOLVED | Noted: 2024-04-04 | Resolved: 2024-04-24

## 2024-04-24 PROBLEM — L23.2 ALLERGIC CONTACT DERMATITIS DUE TO COSMETICS: Status: RESOLVED | Noted: 2022-04-28 | Resolved: 2024-04-24

## 2024-04-24 PROBLEM — N20.2 CALCULUS OF KIDNEY WITH CALCULUS OF URETER: Status: RESOLVED | Noted: 2023-05-02 | Resolved: 2024-04-24

## 2024-04-24 PROBLEM — R51.9 HEADACHE: Status: RESOLVED | Noted: 2023-05-02 | Resolved: 2024-04-24

## 2024-04-24 PROCEDURE — 1036F TOBACCO NON-USER: CPT | Performed by: NURSE PRACTITIONER

## 2024-04-24 PROCEDURE — 99213 OFFICE O/P EST LOW 20 MIN: CPT | Performed by: NURSE PRACTITIONER

## 2024-04-24 NOTE — PATIENT INSTRUCTIONS
Thank you for seeing me today.  It was a pleasure to see you again!    I am glad to hear you are feeling better!!!!!    Remain off Methotrexate    See Dr. Boateng in July as scheduled    For assistance with scheduling referrals or consultations, please call [phone] or [phone].    For laboratory, radiology, and other tests, please call [phone] ([phone] for pediatrics).   If you do not get results within 7-10 days, or you have any questions or concerns, please send a message, call the office ([phone]), or return to the office for a follow-up appointment.     For acute/sick visits, if you are unable to get an office visit, you can do a  On Demand Virtual Visit that is accessible via your My Chart account.  For emergencies, call 9-1-1 or go to the nearest Emergency Department.     Please schedule additional appointment(s) to address concern(s) not addressed today.    Please review prescription inserts and published information for possible adverse effects of all medications.     In general, results are discussed over the phone or via  [employer].     You can see your health information, review clinical summaries from office visits & test results online when you follow your health with MY  Chart, a personal health record.   To sign up go to [email or web address].   If you need assistance with signing up or trouble getting into your account call [employer] Patient Line 24/7 at [phone]

## 2024-04-26 ENCOUNTER — PATIENT OUTREACH (OUTPATIENT)
Dept: CARE COORDINATION | Facility: CLINIC | Age: 24
End: 2024-04-26
Payer: COMMERCIAL

## 2024-04-26 NOTE — CARE PLAN
See below:      Problem: Access to Care Issue  Goal: Assess and Address Access Barriers  Intervention: Confirm adherence with follow up care (labs, tests, studies) and scheduled appointments per provider/specialist instruction  Note: Brief chart review conducted prior to outreach call; no new hospitalizations or ED visits noted.  Spoke ingrid Spivey who reports she is much improved since our last conversation.  Abd pain is better and she will be having GI studies in the near future (ordered).  Was seen by PCP post-DC.  No concerns w medication management.  Plan additional outreach next month; agreeable.  Call for issues or concerns that may arise prior to that.

## 2024-05-17 ENCOUNTER — PATIENT OUTREACH (OUTPATIENT)
Dept: CARE COORDINATION | Facility: CLINIC | Age: 24
End: 2024-05-17
Payer: COMMERCIAL

## 2024-05-17 NOTE — PROGRESS NOTES
Adelina Spivey today.  Reports she has been doing well and no needs at this time.  Will close for CM and encouraged to call for future issues or concerns; agreeable.

## 2024-05-24 ENCOUNTER — OFFICE VISIT (OUTPATIENT)
Dept: PRIMARY CARE | Facility: CLINIC | Age: 24
End: 2024-05-24
Payer: COMMERCIAL

## 2024-05-24 ENCOUNTER — HOSPITAL ENCOUNTER (OUTPATIENT)
Dept: RADIOLOGY | Facility: HOSPITAL | Age: 24
Discharge: HOME | End: 2024-05-24
Payer: COMMERCIAL

## 2024-05-24 VITALS — HEIGHT: 69 IN | WEIGHT: 183 LBS | BODY MASS INDEX: 27.11 KG/M2 | HEART RATE: 75 BPM | OXYGEN SATURATION: 97 %

## 2024-05-24 DIAGNOSIS — M79.661 RIGHT CALF PAIN: ICD-10-CM

## 2024-05-24 DIAGNOSIS — M79.661 RIGHT CALF PAIN: Primary | ICD-10-CM

## 2024-05-24 PROCEDURE — 76881 US COMPL JOINT R-T W/IMG: CPT

## 2024-05-24 PROCEDURE — 1036F TOBACCO NON-USER: CPT

## 2024-05-24 PROCEDURE — 99213 OFFICE O/P EST LOW 20 MIN: CPT

## 2024-05-24 PROCEDURE — 76882 US LMTD JT/FCL EVL NVASC XTR: CPT | Performed by: RADIOLOGY

## 2024-05-24 ASSESSMENT — ENCOUNTER SYMPTOMS
NUMBNESS: 0
INABILITY TO BEAR WEIGHT: 1
TINGLING: 0
MUSCLE WEAKNESS: 1
LEG PAIN: 1
LOSS OF SENSATION: 0

## 2024-05-24 NOTE — PROGRESS NOTES
"Subjective   Patient ID: Allyssa Kelly is a 24 y.o. female who presents for Pain (PT GOT UP AND SAID SHE COULD NOT WALK ON HER RIGHT LEG. SHE HAD SURGERY ON IT A FEW YEARS AGO. PT STATES SHE DOESN'T REMEMBER DOING ANYTHING TO IT. //Wednesday felt pop in leg and can hardly put weight on it, past few months has been experiencing issues and pain in that leg from knee down.//143/101).    Leg Pain   The incident occurred 2 days ago. The incident occurred at home. Injury mechanism: stepped off a step wrong. The pain is present in the right leg. The quality of the pain is described as stabbing. The pain is at a severity of 9/10. The pain is severe. The pain has been Constant since onset. Associated symptoms include an inability to bear weight and muscle weakness. Pertinent negatives include no loss of sensation, numbness or tingling. She reports no foreign bodies present. The symptoms are aggravated by palpation, weight bearing and movement. She has tried immobilization, ice and heat for the symptoms. The treatment provided mild relief.        Review of Systems   Neurological:  Negative for tingling and numbness.       Objective   Pulse 75   Ht 1.753 m (5' 9\")   Wt 83 kg (183 lb) Comment: Boot on foot  SpO2 97%   BMI 27.02 kg/m²     Physical Exam  Vitals and nursing note reviewed.   Constitutional:       General: She is not in acute distress.     Appearance: Normal appearance. She is normal weight. She is not ill-appearing.   Musculoskeletal:      Right lower leg: Deformity and tenderness present. No swelling, lacerations or bony tenderness. No edema.      Left lower leg: Normal.      Right ankle: Decreased range of motion. Normal pulse.      Right Achilles Tendon: Tenderness and defect present.      Left ankle: Normal.      Left Achilles Tendon: Normal.      Right foot: Decreased range of motion.   Neurological:      Mental Status: She is alert and oriented to person, place, and time.         Assessment/Plan "   Allyssa was seen today for pain.  Diagnoses and all orders for this visit:  Right calf pain  -     Cancel: US MSK lower extremity joints tendons muscles; Future  -     US MSK lower extremity joints tendons muscles; Future  -     Referral to Orthopaedic Surgery; Future

## 2024-05-28 ENCOUNTER — OFFICE VISIT (OUTPATIENT)
Dept: ORTHOPEDIC SURGERY | Facility: CLINIC | Age: 24
End: 2024-05-28
Payer: COMMERCIAL

## 2024-05-28 ENCOUNTER — TELEPHONE (OUTPATIENT)
Dept: ORTHOPEDIC SURGERY | Facility: CLINIC | Age: 24
End: 2024-05-28

## 2024-05-28 DIAGNOSIS — S86.001A ACHILLES TENDON INJURY, RIGHT, INITIAL ENCOUNTER: Primary | ICD-10-CM

## 2024-05-28 DIAGNOSIS — M79.661 RIGHT CALF PAIN: ICD-10-CM

## 2024-05-28 PROCEDURE — 1036F TOBACCO NON-USER: CPT

## 2024-05-28 PROCEDURE — 99204 OFFICE O/P NEW MOD 45 MIN: CPT

## 2024-05-28 ASSESSMENT — PAIN SCALES - GENERAL: PAINLEVEL_OUTOF10: 5 - MODERATE PAIN

## 2024-05-28 ASSESSMENT — PAIN - FUNCTIONAL ASSESSMENT: PAIN_FUNCTIONAL_ASSESSMENT: 0-10

## 2024-05-28 NOTE — PROGRESS NOTES
SHAGGY  Allyssa Kelly is a 24 y.o. female  in office today for   Chief Complaint   Patient presents with    Right Leg - Pain     About 2 years ago pt had her achilles repaired-she felt something pop last Wednesday and she was seen by her PCP-she has been in a boot. She states she cannot put weight on that leg without the boot.    .  she states that she is able to get around wearing the boot without too much pain.  She states she also had tendon lengthening surgery on both Achilles prior to the Achilles rupture 2 years ago.    Past Medical History: SLE. LIZ    Medication  Current Outpatient Medications on File Prior to Visit   Medication Sig Dispense Refill    ascorbic acid, vitamin C, 500 mg capsule Take 1 tablet by mouth once daily. TAKE PER DIRECTED      B complex-vitamin C-folic acid (Nephro-Harley Rx) 1- mg-mg-mcg tablet Take 1 tablet by mouth. TAKE PER DIRECTED      cholecalciferol (Vitamin D-3) 25 MCG (1000 UT) capsule Take 1 capsule (25 mcg) by mouth once daily. TAKE PER DIRECTED      cloNIDine ER (Kapvay) 0.1 mg tablet extended release 12 hr Take 2 tablets (0.2 mg) by mouth once daily at bedtime. 180 tablet 2    drospirenone, contraceptive, (Slynd) 4 mg (28) tablet Take 1 tablet by mouth once daily. 30 tablet 2    FLUoxetine (PROzac) 40 mg capsule TAKE 1 CAPSULE BY MOUTH ONCE DAILY 90 capsule 2    mirtazapine (Remeron) 7.5 mg tablet TAKE 1 TABLET (7.5 MG) BY MOUTH ONCE DAILY AT BEDTIME. 90 tablet 2    MULTIVITAMIN-FERROUS FUMARATE-FOLIC ACID 9 MG-200 MCG TABLET, HALF TABLET, (Centrum) Take 1 half tablet by mouth once daily.      ondansetron ODT (Zofran-ODT) 4 mg disintegrating tablet Take 1 tablet (4 mg) by mouth every 8 hours if needed for nausea or vomiting. 20 tablet 0    rizatriptan (Maxalt) 10 mg tablet Take 1 tablet (10 mg) by mouth once daily as needed for migraine.       No current facility-administered medications on file prior to visit.       Physical Exam  Constitutional: well developed,  well nourished female in no acute distress  Psychiatric: normal mood, appropriate affect  Eyes: sclera anicteric  HENT: normocephalic/atraumatic  CV: regular rate and rhythm   Respiratory: non labored breathing  Integumentary: no rash  Neurological: moves all extremities    Right Ankle Exam     Tenderness   Right ankle tenderness location: about Achilles and distal calf.  Swelling: none    Range of Motion   Dorsiflexion:  20   Plantar flexion:  30     Muscle Strength   Dorsiflexion:  4/5  Plantar flexion:  4/5    Other   Erythema: absent  Scars: present  Sensation: normal     Comments:  Negative thompsons, Achilles palpable to heel              Imaging/Lab:  Ultrasound were taken 5/24/24 which were reviewed by myself and read by radiology and show findings of some degree of mild-to-moderate tendinosis and surgical change of the distal  Achilles tendon, with some extension of the tendinosis into the tendon above the site of surgery. There may be some foci of mild partial-thickness interstitial tearing of the distal Achilles tendon.  The tendon appears to be taut without redundancies as visualized with dorsiflexion of the foot.      Assessment  Assessment: Achilles tendon strain    Plan  Plan:  History, physical exam, and imaging were reviewed with patient. The ultra sound and PE are indicating that the Achilles is still at least partially intact.  Recommending to wear the boot at pain persists and then gradual discontinuation.  Orders also given for PT to help with pain and strengthening of the tendon.  RICE and antiinflammatories as needed.  Follow Up: Patient to follow up as needed if pain persists or gets worse.      All questions were answered for the patient prior to end of exam and patient addressed their understanding.    Radha Noland PA-C  05/28/24

## 2024-05-28 NOTE — TELEPHONE ENCOUNTER
Patient called to specialty clinic saying how she was seen this morning by Radha and is looking for a work note since she is starting a new a new job and would like the restrictions to be included. She prefers for the letter to be emailed to her at the confirmed email address in her chart of, [email or web address]

## 2024-06-06 ENCOUNTER — EVALUATION (OUTPATIENT)
Dept: PHYSICAL THERAPY | Facility: CLINIC | Age: 24
End: 2024-06-06
Payer: COMMERCIAL

## 2024-06-06 DIAGNOSIS — S86.001D ACHILLES TENDON INJURY, RIGHT, SUBSEQUENT ENCOUNTER: ICD-10-CM

## 2024-06-06 PROCEDURE — 97162 PT EVAL MOD COMPLEX 30 MIN: CPT | Mod: GP | Performed by: PHYSICAL MEDICINE & REHABILITATION

## 2024-06-06 PROCEDURE — 97110 THERAPEUTIC EXERCISES: CPT | Mod: GP | Performed by: PHYSICAL MEDICINE & REHABILITATION

## 2024-06-06 PROCEDURE — 97140 MANUAL THERAPY 1/> REGIONS: CPT | Mod: GP | Performed by: PHYSICAL MEDICINE & REHABILITATION

## 2024-06-06 ASSESSMENT — PAIN - FUNCTIONAL ASSESSMENT: PAIN_FUNCTIONAL_ASSESSMENT: 0-10

## 2024-06-06 ASSESSMENT — PAIN SCALES - GENERAL: PAINLEVEL_OUTOF10: 4

## 2024-06-06 NOTE — PROGRESS NOTES
"  Physical Therapy  Physical Therapy Orthopedic Evaluation    Patient Name: Allyssa Kelly  MRN: [id number]  Today's Date: 6/6/2024  Time Calculation  Start Time: 0823  Stop Time: 0906  Time Calculation (min): 43 min  PT Evaluation Time Entry  PT Evaluation (Moderate) Time Entry: 20  PT Therapeutic Procedures Time Entry  Manual Therapy Time Entry: 15  Therapeutic Exercise Time Entry: 8    Insurance:  Number of Treatments Authorized: 1 of 30        Insurance Type:  Employee Medical Plan    Current Problem  1. Achilles tendon injury, right, subsequent encounter  Referral to Physical Therapy    Follow Up In Physical Therapy          General:  General  Reason for Referral: Right Achilles Injury  Referred By: Radha Noland PA-C  Past Medical History Relevant to Rehab: Hx of R Achilles Tendon Repair  General Comment: Patient presents with pain in her right achilles tendon pain. Injury occurred on 5/22 after patient was on the beach. Patient felt a \"pop\" in the area followed by pain and swelling. She has been in a walking boot since onset.      Precautions:   Precautions  Precautions Comment: Walking boot    Medical History Form: Reviewed (scanned into chart)    Subjective:   Subjective     Pain:  Pain Assessment: 0-10  Pain Score: 4  Pain Location: Ankle  Pain Orientation: Right, Posterior  Aggravating Factors:  Standing and Walking  Relieving Factors:  Rest    Relevant Information (PMH & Previous Tests/Imaging): Ultrasound imaging revealed tendinosis, with potential partial thickness tearing of the achilles tendon    Previous Interventions/Treatments: None    Prior Level of Function (PLOF)  Patient previously independent with all ADLs  Work/School: Clinical Tech at     Patients Living Environment: Reviewed and no concern    Primary Language: English    Patient's Goal(s) for Therapy: Return to work, improve standing tolerance    Red Flags: Do you have any of the following? No  Fever/chills, unexplained weight " changes, dizziness/fainting, unexplained change in bowel or bladder functions, unexplained malaise or muscle weakness, night pain/sweats, numbness or tingling    Objective:  Objective     ANKLE  Ankle Palpation/Joint Mobility Assessment  Palpation/Joint Mobility Comment: R Achilles tendon is tender to touch and palpation  Ankle AROM  R ankle dorsiflexion: (10°): Lacking 17  L ankle dorsiflexion: (10°): 12  R ankle plantarflexion: (40°): 57  L ankle plantarflexion: (40°): 65  R ankle inversion: (30°): 21  L ankle inversion: (30°): 48  R ankle eversion: (20°): 9  L ankle eversion: (20°): 12  Ankle MMT  R ankle dorsiflexion: (5/5): 3/5  L ankle dorsiflexion: (5/5): 5/5  R ankle plantarflexion: (5/5): 3/5  L ankle plantarflexion: (5/5): 5/5  R ankle inversion: (5/5): 3/5  L ankle inversion: (5/5): 5/5  R ankle eversion: (5/5): 3/5  L ankle eversion: (5/5): 5/5  Special Tests  Ankle Special Tests Comment: Negative Henriquez Test    Outcome Measures:  Other Measures  Lower Extremity Funtional Score (LEFS): 32/80= 40%     Treatment Performed:  Therapeutic Exercise  Therapeutic Exercise Activity 1: Ankle Plantarflexion: x20 w RTB  Therapeutic Exercise Activity 2: Ankle Dorsiflexion: x20 w RTB  Therapeutic Exercise Activity 3: Ankle Eversion: x20 w RTB  Therapeutic Exercise Activity 4: Ankle Inversion: x20 w RTB  Therapeutic Exercise Activity 5: Seated Heel-Toe Rocks    Education: Home exercise program, plan of care, activity modifications, pain management, and injury pathology  Outpatient Education  Education Comment: Access Code: [email or web address]  URL: [email or web address]  Date: 06/06/2024  Prepared by: Lizandro Mukherjee    Exercises  - Long Sitting Ankle Plantar Flexion with Resistance  - 2-3 x daily - 7 x weekly - 1 sets - 20 reps  - Long Sitting Ankle Dorsiflexion with Anchored Resistance  - 2-3 x daily - 7 x weekly - 1 sets - 20 reps  - Long Sitting Ankle Eversion with Resistance  - 2-3 x daily - 7 x weekly -  1 sets - 20 reps  - Long Sitting Ankle Inversion with Resistance  - 2-3 x daily - 7 x weekly - 1 sets - 20 reps  - Seated Heel Toe Raises  - 2-3 x daily - 7 x weekly - 1 sets - 20 reps    Assessment: Patient presents with signs and symptoms consistent with right achilles tendon injury, resulting in limited participation in pain-free ADLs and inability to perform at their prior level of function. Pt would benefit from physical therapy to address the impairments found & listed previously in the objective section in order to return to safe and pain-free ADLs and prior level of function.       Plan:  PT Plan: Skilled PT  PT Frequency: 2 times per week  Duration: 8 weeks  Onset Date: 05/22/24  Number of Treatments Authorized: 1 of 30  Rehab Potential: Good  Plan of Care Agreement: Patient  Planned Interventions include: therapeutic exercise, self-care home management, manual therapy, therapeutic activities, gait training, neuromuscular coordination, vasopneumatic, dry needling, aquatic therapy    Goals: Set and discussed today  Active       PT Problem       Patient to score 80% or greater on LEFS in order to show improvement in overall quality of life.        Start:  06/06/24    Expected End:  08/01/24            Patient to achieve 10 degrees of right ankle dorsiflexion PROM in order to normalize gait pattern.        Start:  06/06/24    Expected End:  08/01/24            Patient to achieve 5/5 strength in right ankle plantarflexion in order to improve standing and walking tolerance.        Start:  06/06/24    Expected End:  08/01/24            Patient to be able to stand for 3+ hours consecutively without any increased pain in right ankle in order to perform work related tasks.        Start:  06/06/24    Expected End:  08/01/24            Patient to demonstrate independence with HEP in order to establish self management of post-injury recovery.        Start:  06/06/24    Expected End:  08/01/24                Plan of  care was developed with input and agreement by the patient      Lizandro Mukherjee, PT

## 2024-06-11 ENCOUNTER — TREATMENT (OUTPATIENT)
Dept: PHYSICAL THERAPY | Facility: CLINIC | Age: 24
End: 2024-06-11
Payer: COMMERCIAL

## 2024-06-11 DIAGNOSIS — S86.001D ACHILLES TENDON INJURY, RIGHT, SUBSEQUENT ENCOUNTER: ICD-10-CM

## 2024-06-11 PROCEDURE — 97140 MANUAL THERAPY 1/> REGIONS: CPT | Mod: GP | Performed by: PHYSICAL MEDICINE & REHABILITATION

## 2024-06-11 PROCEDURE — 97112 NEUROMUSCULAR REEDUCATION: CPT | Mod: GP | Performed by: PHYSICAL MEDICINE & REHABILITATION

## 2024-06-11 PROCEDURE — 97110 THERAPEUTIC EXERCISES: CPT | Mod: GP | Performed by: PHYSICAL MEDICINE & REHABILITATION

## 2024-06-11 ASSESSMENT — PAIN SCALES - GENERAL: PAINLEVEL_OUTOF10: 2

## 2024-06-11 ASSESSMENT — PAIN - FUNCTIONAL ASSESSMENT: PAIN_FUNCTIONAL_ASSESSMENT: 0-10

## 2024-06-11 NOTE — PROGRESS NOTES
"  Physical Therapy Treatment    Patient Name: Allyssa Kelly  MRN: [id number]  Today's Date: 6/11/2024  Time Calculation  Start Time: 0747  Stop Time: 0826  Time Calculation (min): 39 min  PT Therapeutic Procedures Time Entry  Manual Therapy Time Entry: 15  Neuromuscular Re-Education Time Entry: 10  Therapeutic Exercise Time Entry: 14,      Current Problem  1. Achilles tendon injury, right, subsequent encounter  Follow Up In Physical Therapy          Insurance:  Number of Treatments Authorized: 2 of 30          Subjective   General  Reason for Referral: Right Achilles Injury  Referred By: Radha Noland PA-C  Past Medical History Relevant to Rehab: Hx of R Achilles Tendon Repair  General Comment: Patient reports feeling an improvement in pain levels since her previous session. She has remained adherent to her HEP thus far without any issues.    Performing HEP?: Yes    Precautions  Precautions  Precautions Comment: Walking boot  Pain  Pain Assessment: 0-10  Pain Score: 2  Pain Location: Ankle  Pain Orientation: Right, Posterior    Objective   ANKLE  Ankle AROM  R ankle dorsiflexion: (10°): Lacking 2 degrees  R ankle plantarflexion: (40°): 60    Treatments:    Therapeutic Exercise  Therapeutic Exercise Activity 1: Ankle Plantarflexion: x20 w GTB  Therapeutic Exercise Activity 2: Ankle Dorsiflexion: x20 w GTB  Therapeutic Exercise Activity 3: Ankle Eversion: x20 w GTB  Therapeutic Exercise Activity 4: Ankle Inversion: x20 w GTB  Therapeutic Exercise Activity 5: Seated Heel-Toe Rocks    Balance/Neuromuscular Re-Education  Balance/Neuromuscular Re-Education Activity 1: BAPS Board: Seated: L1; Fwd/back; side/side; CW/CCW x20 each  Balance/Neuromuscular Re-Education Activity 2: MTrigger Biofeedback: Seated Heel Raise: 5\"on/10\" off x5' (Target: 259 mV)    Manual Therapy  Manual Therapy Activity 1: IASTM to R Achilles and Gastrocnemius  Manual Therapy Activity 2: R Achilles medial and lateral mobilizations    Assessment:  PT " Assessment  Assessment Comment: Today's visit focused on progressing activities to improve patient's right ankle strength and AROM. Patient demonstrated good effort toward today's progressions. Improved right ankle dorsiflexion and plantarflexion AROM was measured during today's visit. No increased pain reported at the conclusion of the session. Overall response toward today's interventions was great.    Plan:  OP PT Plan  PT Plan: Skilled PT (Continue with current POC. Progress right ankle strength, AROM and functional ability as tolerated.)  PT Frequency: 2 times per week  Duration: 8 weeks  Onset Date: 05/22/24  Number of Treatments Authorized: 2 of 30  Rehab Potential: Good  Plan of Care Agreement: Patient    Goals:  Active       PT Problem       Patient to score 80% or greater on LEFS in order to show improvement in overall quality of life.        Start:  06/06/24    Expected End:  08/01/24            Patient to achieve 10 degrees of right ankle dorsiflexion PROM in order to normalize gait pattern.        Start:  06/06/24    Expected End:  08/01/24            Patient to achieve 5/5 strength in right ankle plantarflexion in order to improve standing and walking tolerance.        Start:  06/06/24    Expected End:  08/01/24            Patient to be able to stand for 3+ hours consecutively without any increased pain in right ankle in order to perform work related tasks.        Start:  06/06/24    Expected End:  08/01/24            Patient to demonstrate independence with HEP in order to establish self management of post-injury recovery.        Start:  06/06/24    Expected End:  08/01/24                 Lizandro Mukherjee, PT

## 2024-06-13 ENCOUNTER — TREATMENT (OUTPATIENT)
Dept: PHYSICAL THERAPY | Facility: CLINIC | Age: 24
End: 2024-06-13
Payer: COMMERCIAL

## 2024-06-13 DIAGNOSIS — S86.001D ACHILLES TENDON INJURY, RIGHT, SUBSEQUENT ENCOUNTER: ICD-10-CM

## 2024-06-13 PROCEDURE — 97140 MANUAL THERAPY 1/> REGIONS: CPT | Mod: GP | Performed by: PHYSICAL MEDICINE & REHABILITATION

## 2024-06-13 PROCEDURE — 97112 NEUROMUSCULAR REEDUCATION: CPT | Mod: GP | Performed by: PHYSICAL MEDICINE & REHABILITATION

## 2024-06-13 PROCEDURE — 97110 THERAPEUTIC EXERCISES: CPT | Mod: GP | Performed by: PHYSICAL MEDICINE & REHABILITATION

## 2024-06-13 ASSESSMENT — PAIN SCALES - GENERAL: PAINLEVEL_OUTOF10: 1

## 2024-06-13 ASSESSMENT — PAIN - FUNCTIONAL ASSESSMENT: PAIN_FUNCTIONAL_ASSESSMENT: 0-10

## 2024-06-13 NOTE — PROGRESS NOTES
"  Physical Therapy Treatment    Patient Name: Allyssa Kelly  MRN: [id number]  Today's Date: 6/13/2024  Time Calculation  Start Time: 0704  Stop Time: 0750  Time Calculation (min): 46 min  PT Therapeutic Procedures Time Entry  Manual Therapy Time Entry: 15  Neuromuscular Re-Education Time Entry: 12  Therapeutic Exercise Time Entry: 19,      Current Problem  1. Achilles tendon injury, right, subsequent encounter  Follow Up In Physical Therapy          Insurance:  Number of Treatments Authorized: 3 of 30          Subjective   General  Reason for Referral: Right Achilles Injury  Referred By: Radha Noland PA-C  Past Medical History Relevant to Rehab: Hx of R Achilles Tendon Repair  General Comment: Patient continues to report improvement in her right ankle. She notes that she began walking around her house without her boot on, and she does not feel pain, but rather discomfort and tightness in her achilles.    Performing HEP?: Yes    Precautions  Precautions  Precautions Comment: Walking boot  Pain  Pain Assessment: 0-10  Pain Score: 1  Pain Location: Ankle  Pain Orientation: Posterior    Objective   ANKLE    Ankle AROM  R ankle dorsiflexion: (10°): 5  R ankle inversion: (30°): 24  R ankle eversion: (20°): 10    Treatments:    Therapeutic Exercise  Therapeutic Exercise Activity 1: Woodpeckers: 10x10\" holds  Therapeutic Exercise Activity 2: TG L2: DL Leg Press: 2x20  Therapeutic Exercise Activity 3: TG L2: DL Heel Raise from neutral: 2x20    Balance/Neuromuscular Re-Education  Balance/Neuromuscular Re-Education Activity 1: MTrigger Biofeedback: Seated Heel Raise: 5\"on/10\" off x5' (Target: 500 mV)  Balance/Neuromuscular Re-Education Activity 2: BAPS Board: Seated: L1; Fwd/back; side/side; CW/CCW x20 each  Balance/Neuromuscular Re-Education Activity 3: Lateral Weight Shifting: x20    Manual Therapy  Manual Therapy Activity 1: IASTM to R Achilles and Gastrocnemius    Assessment:  PT Assessment  Assessment Comment: Today's " visit focused on progressing activities to improve patient's right ankle strength and AROM. Patient demonstrated good effort toward today's progressions. Some weight bearing activities were initiated this visit. Patient was able to complete these with discomfort, but no pain. No increased pain reported at the conclusion of the session. Overall response toward today's interventions was great.    Plan:  OP PT Plan  PT Plan: Skilled PT (Continue with current POC. Progress right ankle strength, AROM and functional ability as tolerated.)  PT Frequency: 2 times per week  Duration: 8 weeks  Onset Date: 05/22/24  Number of Treatments Authorized: 3 of 30  Rehab Potential: Good  Plan of Care Agreement: Patient    Goals:  Active       PT Problem       Patient to score 80% or greater on LEFS in order to show improvement in overall quality of life.        Start:  06/06/24    Expected End:  08/01/24            Patient to achieve 10 degrees of right ankle dorsiflexion PROM in order to normalize gait pattern.        Start:  06/06/24    Expected End:  08/01/24            Patient to achieve 5/5 strength in right ankle plantarflexion in order to improve standing and walking tolerance.        Start:  06/06/24    Expected End:  08/01/24            Patient to be able to stand for 3+ hours consecutively without any increased pain in right ankle in order to perform work related tasks.        Start:  06/06/24    Expected End:  08/01/24            Patient to demonstrate independence with HEP in order to establish self management of post-injury recovery.        Start:  06/06/24    Expected End:  08/01/24                 Lizandro Mukherjee, PT

## 2024-06-20 ENCOUNTER — APPOINTMENT (OUTPATIENT)
Dept: PRIMARY CARE | Facility: CLINIC | Age: 24
End: 2024-06-20
Payer: COMMERCIAL

## 2024-06-20 VITALS
HEART RATE: 83 BPM | BODY MASS INDEX: 28.58 KG/M2 | OXYGEN SATURATION: 98 % | SYSTOLIC BLOOD PRESSURE: 128 MMHG | HEIGHT: 69 IN | WEIGHT: 193 LBS | DIASTOLIC BLOOD PRESSURE: 82 MMHG

## 2024-06-20 DIAGNOSIS — B37.81 CANDIDIASIS OF ESOPHAGUS (MULTI): Primary | ICD-10-CM

## 2024-06-20 PROCEDURE — 99212 OFFICE O/P EST SF 10 MIN: CPT | Performed by: NURSE PRACTITIONER

## 2024-06-20 PROCEDURE — 1036F TOBACCO NON-USER: CPT | Performed by: NURSE PRACTITIONER

## 2024-06-20 RX ORDER — FLUCONAZOLE 100 MG/1
TABLET ORAL
Qty: 11 TABLET | Refills: 0 | Status: SHIPPED | OUTPATIENT
Start: 2024-06-20

## 2024-06-20 NOTE — PROGRESS NOTES
"Allyssa Kelly is a 24 y.o. female who presents today for c/o \"not receiving treatment for yeast in esophagus\"    Chief Complaint   Patient presents with    Abdominal Pain     Patient is coming in today for abdominal pain, diahhrea, cramping and nausea. Patient states she has been having GI issues for awhile and was scoped and was told she had candida in her gut but they never gave her any medications so she would like an antifungal.         Pt states she had an EGD in April that was + for yeast and the other day she did a home spit test that also confirmed yeast and she doesn't understand why no one treated her     Upon chart review, Dr. Galdamez, GI did send in diflucan to [facility], but pt was not notified and did not know.   Will resend Rx today and encouraged pt to f/u with GI if no improvement after treatment.     Allergies   Allergen Reactions    Methotrexate Other     Abdominal bloating/distention     Tretinoin Rash       Review of Systems  ROS was completed and all systems are negative with the exception of what was noted in the the HPI.       Objective   Vitals:  /82   Pulse 83   Ht 1.753 m (5' 9\")   Wt 87.5 kg (193 lb)   SpO2 98%   BMI 28.50 kg/m²       Current Outpatient Medications   Medication Instructions    ascorbic acid, vitamin C, 500 mg capsule 1 tablet, oral, Daily, TAKE PER DIRECTED    B complex-vitamin C-folic acid (Nephro-Harley Rx) 1- mg-mg-mcg tablet 1 tablet, oral, TAKE PER DIRECTED    cholecalciferol (VITAMIN D-3) 25 mcg, oral, Daily, TAKE PER DIRECTED    cloNIDine ER (KAPVAY) 0.2 mg, oral, Nightly    drospirenone, contraceptive, (Slynd) 4 mg (28) tablet 1 tablet, oral, Daily    fluconazole (Diflucan) 100 mg tablet Take 2 tablets (200 mg) by mouth x 1 day, then take 1 tablet by mouth once daily x 9 days.    FLUoxetine (PROzac) 40 mg capsule TAKE 1 CAPSULE BY MOUTH ONCE DAILY    mirtazapine (REMERON) 7.5 mg, oral, Nightly    MULTIVITAMIN-FERROUS FUMARATE-FOLIC ACID 9 MG-200 " MCG TABLET, HALF TABLET, (Centrum) 1 tablet, oral, Daily    ondansetron ODT (ZOFRAN-ODT) 4 mg, oral, Every 8 hours PRN    rizatriptan (MAXALT) 10 mg, oral, Daily PRN         Physical Exam    Assessment/Plan   Problem List Items Addressed This Visit    None  Visit Diagnoses         Codes    Candidiasis of esophagus (Multi)    -  Primary B37.81    Relevant Medications    fluconazole (Diflucan) 100 mg tablet

## 2024-06-20 NOTE — PATIENT INSTRUCTIONS
I have resent the Rx for Diflucan    Please f/u with GI if symptoms not resolved after treatment

## 2024-06-21 ENCOUNTER — TREATMENT (OUTPATIENT)
Dept: PHYSICAL THERAPY | Facility: CLINIC | Age: 24
End: 2024-06-21
Payer: COMMERCIAL

## 2024-06-21 DIAGNOSIS — S86.001D ACHILLES TENDON INJURY, RIGHT, SUBSEQUENT ENCOUNTER: ICD-10-CM

## 2024-06-21 PROCEDURE — 97110 THERAPEUTIC EXERCISES: CPT | Mod: GP | Performed by: PHYSICAL MEDICINE & REHABILITATION

## 2024-06-21 PROCEDURE — 97140 MANUAL THERAPY 1/> REGIONS: CPT | Mod: GP | Performed by: PHYSICAL MEDICINE & REHABILITATION

## 2024-06-21 ASSESSMENT — PAIN SCALES - GENERAL: PAINLEVEL_OUTOF10: 1

## 2024-06-21 ASSESSMENT — PAIN - FUNCTIONAL ASSESSMENT: PAIN_FUNCTIONAL_ASSESSMENT: 0-10

## 2024-06-21 NOTE — PROGRESS NOTES
"  Physical Therapy Treatment    Patient Name: Allyssa Kelly  MRN: [id number]  Today's Date: 6/21/2024  Time Calculation  Start Time: 1412  Stop Time: 1450  Time Calculation (min): 38 min  PT Therapeutic Procedures Time Entry  Manual Therapy Time Entry: 10  Neuromuscular Re-Education Time Entry: 5  Therapeutic Exercise Time Entry: 23,      Current Problem  1. Achilles tendon injury, right, subsequent encounter  Follow Up In Physical Therapy          Insurance:  Number of Treatments Authorized: 5 of 30          Subjective   General  Reason for Referral: Right Achilles Injury  Referred By: Radha Noland PA-C  Past Medical History Relevant to Rehab: Hx of R Achilles Tendon Repair  General Comment: Patient reports no new complaints regarding her right ankle. She notes that she continues to wean herself out of her boot, and is able to spend more time out of it without any adverse responses.    Performing HEP?: Yes    Precautions  Precautions  Precautions Comment: Walking boot  Pain  Pain Assessment: 0-10  0-10 (Numeric) Pain Score: 1  Pain Location: Ankle  Pain Orientation: Posterior    Objective   Treatments:    Therapeutic Exercise  Therapeutic Exercise Activity 1: Woodpeckers: 10x10\" holds  Therapeutic Exercise Activity 2: DL Heel Raise: 2x10 (Small range)  Therapeutic Exercise Activity 3: TG L4: DL Leg Press: 2x20  Therapeutic Exercise Activity 4: TG L2: DL Heel Raise from neutral: 2x20    Balance/Neuromuscular Re-Education  Balance/Neuromuscular Re-Education Activity 1: MTrigger Biofeedback: Seated Heel Raise w 10# on knee: 10\"on/10\" off x5' (Target: 550 mV)    Manual Therapy  Manual Therapy Activity 1: IASTM to R Achilles and Gastrocnemius    Assessment:  PT Assessment  Assessment Comment: Today's visit focused on progressing activities to improve patient's right ankle strength and AROM. Patient demonstrated good effort toward today's progressions. Some weight bearing activities were initiated this visit. Patient " was able to complete these with discomfort, but no pain. No increased pain reported at the conclusion of the session. Overall response toward today's interventions was great.    Plan:  OP PT Plan  PT Plan: Skilled PT (Continue with current POC. Progress right ankle strength, AROM and functional ability as tolerated.)  PT Frequency: 2 times per week  Duration: 8 weeks  Onset Date: 05/22/24  Number of Treatments Authorized: 5 of 30  Rehab Potential: Good  Plan of Care Agreement: Patient    Goals:  Active       PT Problem       Patient to score 80% or greater on LEFS in order to show improvement in overall quality of life.        Start:  06/06/24    Expected End:  08/01/24            Patient to achieve 10 degrees of right ankle dorsiflexion PROM in order to normalize gait pattern.        Start:  06/06/24    Expected End:  08/01/24            Patient to achieve 5/5 strength in right ankle plantarflexion in order to improve standing and walking tolerance.        Start:  06/06/24    Expected End:  08/01/24            Patient to be able to stand for 3+ hours consecutively without any increased pain in right ankle in order to perform work related tasks.        Start:  06/06/24    Expected End:  08/01/24            Patient to demonstrate independence with HEP in order to establish self management of post-injury recovery.        Start:  06/06/24    Expected End:  08/01/24                 Lizandro Mukherjee, PT

## 2024-06-25 ENCOUNTER — TREATMENT (OUTPATIENT)
Dept: PHYSICAL THERAPY | Facility: CLINIC | Age: 24
End: 2024-06-25
Payer: COMMERCIAL

## 2024-06-25 DIAGNOSIS — S86.001D ACHILLES TENDON INJURY, RIGHT, SUBSEQUENT ENCOUNTER: ICD-10-CM

## 2024-06-25 PROCEDURE — 97110 THERAPEUTIC EXERCISES: CPT | Mod: GP | Performed by: PHYSICAL MEDICINE & REHABILITATION

## 2024-06-25 PROCEDURE — 97140 MANUAL THERAPY 1/> REGIONS: CPT | Mod: GP | Performed by: PHYSICAL MEDICINE & REHABILITATION

## 2024-06-25 PROCEDURE — 97112 NEUROMUSCULAR REEDUCATION: CPT | Mod: GP | Performed by: PHYSICAL MEDICINE & REHABILITATION

## 2024-06-25 ASSESSMENT — PAIN - FUNCTIONAL ASSESSMENT: PAIN_FUNCTIONAL_ASSESSMENT: 0-10

## 2024-06-25 ASSESSMENT — PAIN SCALES - GENERAL: PAINLEVEL_OUTOF10: 1

## 2024-06-25 NOTE — PROGRESS NOTES
"  Physical Therapy Treatment    Patient Name: Allyssa Kelly  MRN: [id number]  Today's Date: 6/25/2024  Time Calculation  Start Time: 1517  Stop Time: 1557  Time Calculation (min): 40 min  PT Therapeutic Procedures Time Entry  Manual Therapy Time Entry: 8  Neuromuscular Re-Education Time Entry: 12  Therapeutic Exercise Time Entry: 20,      Current Problem  1. Achilles tendon injury, right, subsequent encounter  Follow Up In Physical Therapy          Insurance:  Number of Treatments Authorized: 6 of 30          Subjective   General  Reason for Referral: Right Achilles Injury  Referred By: Radha Noland PA-C  Past Medical History Relevant to Rehab: Hx of R Achilles Tendon Repair  General Comment: Patient reports to be doing well this visit. Moderate levels of soreness was reported for the rest of the day following her previous session. No increased pain or adverse responses otherwise.    Performing HEP?: Yes    Precautions  Precautions  Precautions Comment: Walking boot  Pain  Pain Assessment: 0-10  0-10 (Numeric) Pain Score: 1  Pain Location: Ankle  Pain Orientation: Posterior    Objective   Treatments:    Therapeutic Exercise  Therapeutic Exercise Activity 1: Woodpeckers: 10x10\" holds  Therapeutic Exercise Activity 2: DL Heel Raise: 2x10 (Small range)  Therapeutic Exercise Activity 3: TG L5: DL Leg Press: 2x20  Therapeutic Exercise Activity 4: TG L5: DL Heel Raise from neutral: 2x20  Therapeutic Exercise Activity 5: TG L3: SL Leg Press: 2x10  Therapeutic Exercise Activity 6: TG L3: SL Heel Raise: 2x10    Balance/Neuromuscular Re-Education  Balance/Neuromuscular Re-Education Activity 1: MTrigger Biofeedback: Standing heel raise : 5\"on/10\" off x5' (Target: 533 mV)  Balance/Neuromuscular Re-Education Activity 2: BAPS Board: Seated: L1; Fwd/back; side/side; CW/CCW x20 each    Manual Therapy  Manual Therapy Activity 1: IASTM to R Achilles and Gastrocnemius    Assessment:  PT Assessment  Assessment Comment: Today's visit " focused on progressing activities to improve patient's right ankle strength and AROM. Patient demonstrated good effort toward today's progressions. Patient was able to complete these with discomfort, but no pain. No increased pain reported at the conclusion of the session. Overall response toward today's interventions was great.    Plan:  OP PT Plan  PT Plan: Skilled PT (Continue with current POC. Progress right ankle strength, AROM and functional ability as tolerated.)  PT Frequency: 2 times per week  Duration: 8 weeks  Onset Date: 05/22/24  Number of Treatments Authorized: 6 of 30  Rehab Potential: Good  Plan of Care Agreement: Patient    Goals:  Active       PT Problem       Patient to score 80% or greater on LEFS in order to show improvement in overall quality of life.        Start:  06/06/24    Expected End:  08/01/24            Patient to achieve 10 degrees of right ankle dorsiflexion PROM in order to normalize gait pattern.        Start:  06/06/24    Expected End:  08/01/24            Patient to achieve 5/5 strength in right ankle plantarflexion in order to improve standing and walking tolerance.        Start:  06/06/24    Expected End:  08/01/24            Patient to be able to stand for 3+ hours consecutively without any increased pain in right ankle in order to perform work related tasks.        Start:  06/06/24    Expected End:  08/01/24            Patient to demonstrate independence with HEP in order to establish self management of post-injury recovery.        Start:  06/06/24    Expected End:  08/01/24                 Lizandro Mukherjee, PT

## 2024-06-27 ENCOUNTER — TREATMENT (OUTPATIENT)
Dept: PHYSICAL THERAPY | Facility: CLINIC | Age: 24
End: 2024-06-27
Payer: COMMERCIAL

## 2024-06-27 DIAGNOSIS — S86.001D ACHILLES TENDON INJURY, RIGHT, SUBSEQUENT ENCOUNTER: ICD-10-CM

## 2024-06-27 PROCEDURE — 97110 THERAPEUTIC EXERCISES: CPT | Mod: GP | Performed by: PHYSICAL MEDICINE & REHABILITATION

## 2024-06-27 PROCEDURE — 97112 NEUROMUSCULAR REEDUCATION: CPT | Mod: GP | Performed by: PHYSICAL MEDICINE & REHABILITATION

## 2024-06-27 ASSESSMENT — PAIN SCALES - GENERAL: PAINLEVEL_OUTOF10: 0 - NO PAIN

## 2024-06-27 ASSESSMENT — PAIN - FUNCTIONAL ASSESSMENT: PAIN_FUNCTIONAL_ASSESSMENT: 0-10

## 2024-06-27 NOTE — PROGRESS NOTES
"  Physical Therapy Treatment    Patient Name: Allyssa Kelly  MRN: [id number]  Today's Date: 6/27/2024  Time Calculation  Start Time: 1521  Stop Time: 1608  Time Calculation (min): 47 min  PT Therapeutic Procedures Time Entry  Manual Therapy Time Entry: 10  Neuromuscular Re-Education Time Entry: 12  Therapeutic Exercise Time Entry: 25,      Current Problem  1. Achilles tendon injury, right, subsequent encounter  Follow Up In Physical Therapy          Insurance:  Number of Treatments Authorized: 7 of 30          Subjective   General  Reason for Referral: Right Achilles Injury  Referred By: Radha Noland PA-C  Past Medical History Relevant to Rehab: Hx of R Achilles Tendon Repair  General Comment: Patient reports to be doing well this visit. Moderate levels of soreness were reported following her previous session, but this resolved within 24 hours. No increased pain or adverse responses otherwise.    Performing HEP?: Yes    Precautions  Precautions  Precautions Comment: Walking boot  Pain  Pain Assessment: 0-10  0-10 (Numeric) Pain Score: 0 - No pain  Pain Location: Ankle  Pain Orientation: Posterior    Objective   Treatments:    Therapeutic Exercise  Therapeutic Exercise Activity 1: DL Heel Raise: 3x10 w toes elevated on 10# bar  Therapeutic Exercise Activity 2: TG L6: DL Leg Press: 2x20  Therapeutic Exercise Activity 3: TG L6: DL Heel Raise from neutral: 2x20  Therapeutic Exercise Activity 4: TG L4: SL Leg Press: 2x10  Therapeutic Exercise Activity 5: TG L4: SL Heel Raise: 2x10    Balance/Neuromuscular Re-Education  Balance/Neuromuscular Re-Education Activity 1: MTrigger Biofeedback: Standing heel raise : 5\"on/10\" off x5' (Target: 600 mV)  Balance/Neuromuscular Re-Education Activity 2: BAPS Board: Standing: L1; Fwd/back; side/side; CW/CCW x20 each    Manual Therapy  Manual Therapy Activity 1: IASTM to R Achilles and Gastrocnemius    Assessment:  PT Assessment  Assessment Comment: Today's visit focused on " progressing activities to improve patient's right ankle strength and AROM. Patient demonstrated good effort toward today's progressions. Patient was able to complete these with a feeling of tightness, but no pain. No increased pain reported at the conclusion of the session. Overall response toward today's interventions was great.    Plan:  OP PT Plan  PT Plan: Skilled PT (Continue with current POC. Progress right ankle strength, AROM and functional ability as tolerated.)  PT Frequency: 2 times per week  Duration: 8 weeks  Onset Date: 05/22/24  Number of Treatments Authorized: 7 of 30  Rehab Potential: Good  Plan of Care Agreement: Patient    Goals:  Active       PT Problem       Patient to score 80% or greater on LEFS in order to show improvement in overall quality of life.        Start:  06/06/24    Expected End:  08/01/24            Patient to achieve 10 degrees of right ankle dorsiflexion PROM in order to normalize gait pattern.        Start:  06/06/24    Expected End:  08/01/24            Patient to achieve 5/5 strength in right ankle plantarflexion in order to improve standing and walking tolerance.        Start:  06/06/24    Expected End:  08/01/24            Patient to be able to stand for 3+ hours consecutively without any increased pain in right ankle in order to perform work related tasks.        Start:  06/06/24    Expected End:  08/01/24            Patient to demonstrate independence with HEP in order to establish self management of post-injury recovery.        Start:  06/06/24    Expected End:  08/01/24                 Lizandro Mukherjee, PT

## 2024-07-03 ENCOUNTER — TREATMENT (OUTPATIENT)
Dept: PHYSICAL THERAPY | Facility: CLINIC | Age: 24
End: 2024-07-03
Payer: COMMERCIAL

## 2024-07-03 DIAGNOSIS — S86.001D ACHILLES TENDON INJURY, RIGHT, SUBSEQUENT ENCOUNTER: ICD-10-CM

## 2024-07-03 PROCEDURE — 97112 NEUROMUSCULAR REEDUCATION: CPT | Mod: GP | Performed by: PHYSICAL MEDICINE & REHABILITATION

## 2024-07-03 PROCEDURE — 97110 THERAPEUTIC EXERCISES: CPT | Mod: GP | Performed by: PHYSICAL MEDICINE & REHABILITATION

## 2024-07-03 PROCEDURE — 97140 MANUAL THERAPY 1/> REGIONS: CPT | Mod: GP | Performed by: PHYSICAL MEDICINE & REHABILITATION

## 2024-07-03 ASSESSMENT — PAIN - FUNCTIONAL ASSESSMENT: PAIN_FUNCTIONAL_ASSESSMENT: 0-10

## 2024-07-03 ASSESSMENT — PAIN SCALES - GENERAL: PAINLEVEL_OUTOF10: 0 - NO PAIN

## 2024-07-03 NOTE — PROGRESS NOTES
"  Physical Therapy Treatment    Patient Name: Allyssa Kelly  MRN: [id number]  Today's Date: 7/3/2024  Time Calculation  Start Time: 1323  Stop Time: 1408  Time Calculation (min): 45 min  PT Therapeutic Procedures Time Entry  Manual Therapy Time Entry: 15  Neuromuscular Re-Education Time Entry: 12  Therapeutic Exercise Time Entry: 18,      Current Problem  1. Achilles tendon injury, right, subsequent encounter  Follow Up In Physical Therapy          Insurance:  Number of Treatments Authorized: 8 of 30          Subjective   General  Reason for Referral: Right Achilles Injury  Referred By: Radha Noland PA-C  Past Medical History Relevant to Rehab: Hx of R Achilles Tendon Repair  General Comment: Patient continues to report feeling very good. Some soreness was felt following her previous session, but this resolved by the next day.    Performing HEP?: Yes    Precautions  Precautions  Precautions Comment: Walking boot  Pain  Pain Assessment: 0-10  0-10 (Numeric) Pain Score: 0 - No pain  Pain Location: Ankle  Pain Orientation: Right, Posterior    Objective   ANKLE  Ankle AROM  R ankle dorsiflexion: (10°): 8  R ankle inversion: (30°): 41  R ankle eversion: (20°): 9    Treatments:    Therapeutic Exercise  Therapeutic Exercise Activity 1: DL Heel Raise: 2x10 w SL Eccentric  Therapeutic Exercise Activity 2: TG L7: DL Leg Press: 2x20  Therapeutic Exercise Activity 3: TG L7: DL Heel Raise from neutral: 2x20  Therapeutic Exercise Activity 4: TG L5: SL Leg Press: 2x10  Therapeutic Exercise Activity 5: TG L5: SL Heel Raise: 2x10    Balance/Neuromuscular Re-Education  Balance/Neuromuscular Re-Education Activity 1: MTrigger Biofeedback: Standing heel raise : 10\"on/10\" off x5' (Target: 650 mV)  Balance/Neuromuscular Re-Education Activity 2: BOSU Forward Step Up: 2x10 w OKD    Manual Therapy  Manual Therapy Activity 1: IASTM to R Achilles    Assessment:  PT Assessment  Assessment Comment: Today's visit focused on progressing " activities to improve patient's right ankle strength and AROM. Patient demonstrated good effort toward today's progressions. Patient was able to complete these with a feeling of tightness, but no pain. No increased pain reported at the conclusion of the session. Overall response toward today's interventions was great.    Plan:  OP PT Plan  PT Plan: Skilled PT (Continue with current POC. Progress right ankle strength, AROM and functional ability as tolerated.)  PT Frequency: 2 times per week  Duration: 8 weeks  Onset Date: 05/22/24  Number of Treatments Authorized: 8 of 30  Rehab Potential: Good  Plan of Care Agreement: Patient    Goals:  Active       PT Problem       Patient to score 80% or greater on LEFS in order to show improvement in overall quality of life.        Start:  06/06/24    Expected End:  08/01/24            Patient to achieve 10 degrees of right ankle dorsiflexion PROM in order to normalize gait pattern.        Start:  06/06/24    Expected End:  08/01/24            Patient to achieve 5/5 strength in right ankle plantarflexion in order to improve standing and walking tolerance.        Start:  06/06/24    Expected End:  08/01/24            Patient to be able to stand for 3+ hours consecutively without any increased pain in right ankle in order to perform work related tasks.        Start:  06/06/24    Expected End:  08/01/24            Patient to demonstrate independence with HEP in order to establish self management of post-injury recovery.        Start:  06/06/24    Expected End:  08/01/24                 Lizandro Mukherjee, PT

## 2024-07-08 ENCOUNTER — HOSPITAL ENCOUNTER (OUTPATIENT)
Dept: RADIOLOGY | Facility: HOSPITAL | Age: 24
Discharge: HOME | End: 2024-07-08
Payer: COMMERCIAL

## 2024-07-08 DIAGNOSIS — K30 DELAYED GASTRIC EMPTYING: ICD-10-CM

## 2024-07-08 PROCEDURE — 78264 GASTRIC EMPTYING IMG STUDY: CPT

## 2024-07-08 PROCEDURE — 3430000001 HC RX 343 DIAGNOSTIC RADIOPHARMACEUTICALS: Performed by: NURSE PRACTITIONER

## 2024-07-08 PROCEDURE — 78264 GASTRIC EMPTYING IMG STUDY: CPT | Performed by: STUDENT IN AN ORGANIZED HEALTH CARE EDUCATION/TRAINING PROGRAM

## 2024-07-09 ENCOUNTER — APPOINTMENT (OUTPATIENT)
Dept: PHYSICAL THERAPY | Facility: CLINIC | Age: 24
End: 2024-07-09
Payer: COMMERCIAL

## 2024-07-11 ENCOUNTER — OFFICE VISIT (OUTPATIENT)
Dept: GASTROENTEROLOGY | Facility: HOSPITAL | Age: 24
End: 2024-07-11
Payer: COMMERCIAL

## 2024-07-11 ENCOUNTER — TREATMENT (OUTPATIENT)
Dept: PHYSICAL THERAPY | Facility: CLINIC | Age: 24
End: 2024-07-11
Payer: COMMERCIAL

## 2024-07-11 VITALS
SYSTOLIC BLOOD PRESSURE: 131 MMHG | TEMPERATURE: 97.7 F | OXYGEN SATURATION: 96 % | DIASTOLIC BLOOD PRESSURE: 85 MMHG | BODY MASS INDEX: 29.21 KG/M2 | WEIGHT: 197.8 LBS | HEART RATE: 82 BPM

## 2024-07-11 DIAGNOSIS — R14.0 BLOATING: Primary | ICD-10-CM

## 2024-07-11 DIAGNOSIS — S86.001D ACHILLES TENDON INJURY, RIGHT, SUBSEQUENT ENCOUNTER: ICD-10-CM

## 2024-07-11 DIAGNOSIS — K30 DELAYED GASTRIC EMPTYING: ICD-10-CM

## 2024-07-11 PROCEDURE — 97014 ELECTRIC STIMULATION THERAPY: CPT | Mod: GP | Performed by: PHYSICAL MEDICINE & REHABILITATION

## 2024-07-11 PROCEDURE — 97110 THERAPEUTIC EXERCISES: CPT | Mod: GP | Performed by: PHYSICAL MEDICINE & REHABILITATION

## 2024-07-11 PROCEDURE — 97140 MANUAL THERAPY 1/> REGIONS: CPT | Mod: GP | Performed by: PHYSICAL MEDICINE & REHABILITATION

## 2024-07-11 SDOH — ECONOMIC STABILITY: FOOD INSECURITY: WITHIN THE PAST 12 MONTHS, THE FOOD YOU BOUGHT JUST DIDN'T LAST AND YOU DIDN'T HAVE MONEY TO GET MORE.: NEVER TRUE

## 2024-07-11 SDOH — ECONOMIC STABILITY: FOOD INSECURITY: WITHIN THE PAST 12 MONTHS, YOU WORRIED THAT YOUR FOOD WOULD RUN OUT BEFORE YOU GOT MONEY TO BUY MORE.: NEVER TRUE

## 2024-07-11 ASSESSMENT — PAIN SCALES - GENERAL
PAINLEVEL: 0-NO PAIN
PAINLEVEL_OUTOF10: 0 - NO PAIN

## 2024-07-11 ASSESSMENT — PATIENT HEALTH QUESTIONNAIRE - PHQ9
SUM OF ALL RESPONSES TO PHQ9 QUESTIONS 1 & 2: 0
2. FEELING DOWN, DEPRESSED OR HOPELESS: NOT AT ALL
1. LITTLE INTEREST OR PLEASURE IN DOING THINGS: NOT AT ALL

## 2024-07-11 ASSESSMENT — PAIN - FUNCTIONAL ASSESSMENT: PAIN_FUNCTIONAL_ASSESSMENT: 0-10

## 2024-07-11 ASSESSMENT — LIFESTYLE VARIABLES
SKIP TO QUESTIONS 9-10: 1
HOW OFTEN DO YOU HAVE A DRINK CONTAINING ALCOHOL: MONTHLY OR LESS
HOW MANY STANDARD DRINKS CONTAINING ALCOHOL DO YOU HAVE ON A TYPICAL DAY: 1 OR 2
HOW OFTEN DO YOU HAVE SIX OR MORE DRINKS ON ONE OCCASION: NEVER
AUDIT-C TOTAL SCORE: 1

## 2024-07-11 NOTE — PROGRESS NOTES
"  Physical Therapy Treatment    Patient Name: Allyssa Kelly  MRN: [id number]  Today's Date: 7/11/2024  Time Calculation  Start Time: 1116  Stop Time: 1211  Time Calculation (min): 55 min  PT Therapeutic Procedures Time Entry  Manual Therapy Time Entry: 30  Neuromuscular Re-Education Time Entry: 4  Therapeutic Exercise Time Entry: 8, PT Modalities Time Entry  E-Stim (Unattended) Time Entry: 6    Current Problem  1. Achilles tendon injury, right, subsequent encounter  Follow Up In Physical Therapy          Insurance:  Number of Treatments Authorized: 9 of 30          Subjective   General  Reason for Referral: Right Achilles Injury  Referred By: Radha Noland PA-C  Past Medical History Relevant to Rehab: Hx of R Achilles Tendon Repair  General Comment: Patient reports to be doing well this visit. She followed up with her podiatrist earlier today, who recommended she try dry needling to work on the amount of scar tissue adhesions around her right achilles tendon. Additionally, it was recommended that patient works on her plantar fascia as well.    Performing HEP?: Yes    Precautions  Precautions  Precautions Comment: WBAT  Pain  Pain Assessment: 0-10  0-10 (Numeric) Pain Score: 0 - No pain  Pain Location: Ankle  Pain Orientation: Right, Posterior    Objective   Treatments:    Therapeutic Exercise  Therapeutic Exercise Activity 1: Great Toe Flexion: x20 w RTB  Therapeutic Exercise Activity 2: Standing Heel Raise with Towel roll between heels: x20    Balance/Neuromuscular Re-Education  Balance/Neuromuscular Re-Education Activity 1: Short Foot Hold: 2x30\" each side w RTB    Manual Therapy  Manual Therapy Activity 1: Written Consent Obtained Prior to Treatment: Dry Needling to Right gastroc (2x60mm), achilles (2x40 mm) and surgical incision (6x30 mm); TENS administered concurrently  Manual Therapy Activity 2: IASTM to R gastroc, achilles and bilateral plantar fascia    Modalities  Modality 1: Untimed ESU - Electrical " Stimulation Unattended (TENS with dry needlin Hz x6')    OP EDUCATION:  Outpatient Education  Education Comment: Access Code: [email or web address]  URL: [email or web address]  Date: 2024  Prepared by: Lizandro Mukherjee    Exercises  - Great Toe Flexion with Resistance  - 1 x daily - 7 x weekly - 1 sets - 20 reps  - Short Foot Hold  - 1 x daily - 7 x weekly - 2-3 sets - 30 second hold  - Standing Calf Raise With Small Ball at Heels  - 1 x daily - 7 x weekly - 1 sets - 20 reps    Assessment:  PT Assessment  Assessment Comment: Today's visit focused on progressing activities to improve patient's right ankle strength and AROM. Patient demonstrated good effort toward today's progressions. Patient was able to complete these with a feeling of tightness, but no pain. No increased pain reported at the conclusion of the session. Overall response toward today's interventions was great.    Plan:  OP PT Plan  PT Plan: Skilled PT (Continue with current POC. Progress right ankle strength, AROM and functional ability as tolerated.)  PT Frequency: 2 times per week  Duration: 8 weeks  Onset Date: 24  Number of Treatments Authorized: 9 of 30  Rehab Potential: Good  Plan of Care Agreement: Patient    Goals:  Active       PT Problem       Patient to score 80% or greater on LEFS in order to show improvement in overall quality of life.        Start:  24    Expected End:  24            Patient to achieve 10 degrees of right ankle dorsiflexion PROM in order to normalize gait pattern.        Start:  24    Expected End:  24            Patient to achieve 5/5 strength in right ankle plantarflexion in order to improve standing and walking tolerance.        Start:  24    Expected End:  24            Patient to be able to stand for 3+ hours consecutively without any increased pain in right ankle in order to perform work related tasks.        Start:  24    Expected End:  24             Patient to demonstrate independence with HEP in order to establish self management of post-injury recovery.        Start:  06/06/24    Expected End:  08/01/24                 Lizandro Mukherjee, PT

## 2024-07-11 NOTE — PROGRESS NOTES
Subjective   Patient ID: Allyssa Kelly is a 24 y.o. female who presents for No chief complaint on file..    HPI  Ms. Kelly is a 25yo femlae with hx of SLE, recent candida esophagitis (dxed 2024) establishing care at GI clinic for abdominal bloating and weight gain.    Today pt is here with her mother. She reports that for the past 1.5 years, she has been having daily abdominal bloating lasting the entire day. The bloating starts nearly right after eating or drinking. She had tried a FODMAP diet in the past but it did not improve her symptoms and could not identify food triggers. In addition, pt states having nausea (3 times out of the week) with occasional vomiting. The bloating was especially worst with methotrexate and has been paused for nearly 3mo. Pt recently completed a 10day course for candida esophagitis in 2024 (dx-ed on 2024 egd) but did not notice a change in her symptoms. For several years, pt has had 5BMs per day ranging from thea to liquid stool, no blood. Often, pt has to have a BM right after eating. Furthermore, pt was concerned about 50lbs weight gain in the past 1.5 years despite trying eat a relatively  healthy diet.    ROS negative for fever, chills, chest pain, shortness of breath, cough, dysuria.  Denies dysphagia (has to drink plenty of water with dry foods but no sensation of stuck in throat), odonophagia.    Family hx:   Paternal grandmother  from colon cancer in age 40s  Dad: Strong hx of lupus on dads side.   Mom: hx of lymphoma, breast cancer   No family hx of celiac, IBD.     Social Hx:  No tobacco use  Occasional social etoh use (1-2 glasses of wine)  No illicts    Current Outpatient Medications   Medication Instructions    ascorbic acid, vitamin C, 500 mg capsule 1 tablet, oral, Daily, TAKE PER DIRECTED    B complex-vitamin C-folic acid (Nephro-[facility] Rx) 1- mg-mg-mcg tablet 1 tablet, oral, TAKE PER DIRECTED    cholecalciferol (VITAMIN D-3) 25 mcg, oral,  "Daily, TAKE PER DIRECTED    cloNIDine ER (KAPVAY) 0.2 mg, oral, Nightly    drospirenone, contraceptive, (Slynd) 4 mg (28) tablet 1 tablet, oral, Daily    FLUoxetine (PROzac) 40 mg capsule TAKE 1 CAPSULE BY MOUTH ONCE DAILY    mirtazapine (REMERON) 7.5 mg, oral, Nightly    MULTIVITAMIN-FERROUS FUMARATE-FOLIC ACID 9 MG-200 MCG TABLET, HALF TABLET, (Centrum) 1 tablet, oral, Daily    ondansetron ODT (ZOFRAN-ODT) 4 mg, oral, Every 8 hours PRN    rizatriptan (MAXALT) 10 mg, oral, Daily PRN        Objective   Visit Vitals  /85   Pulse 82   Temp 36.5 °C (97.7 °F)      Physical Exam  Gen: NAD, well-nourished  HEENT: EOMI, moist mucous membranes  Cardio: RRR  Pulm: CTAB, normal respiratory effort  Abd: soft, non-distended, mild discomfort with palpation above umbilical area  Ext: warm, no edema  Neuro: AOx4, no focal neuro deficits, follows commands  Skin: warm, dry, intact  Psych: appropriate mood and affect      Labs:  CBC RFP   Lab Results   Component Value Date    WBC 7.7 04/05/2024    HGB 13.1 04/05/2024    HCT 39.6 04/05/2024    MCV 87 04/05/2024     04/05/2024    NEUTROABS 3.64 04/05/2024    Lab Results   Component Value Date     (L) 04/04/2024    K 4.4 04/04/2024     04/04/2024    CO2 24 04/04/2024    BUN 9 04/04/2024    CREATININE 0.69 04/04/2024     Lab Results   Component Value Date    MG 2.19 03/31/2024    PHOS 3.3 04/13/2022    CALCIUM 9.5 04/04/2024    ALBUMIN 4.4 03/31/2024         Hepatic Function ABG/VBG   Lab Results   Component Value Date    ALT 15 03/31/2024    AST 14 03/31/2024    ALKPHOS 70 03/31/2024     No results found for: \"TBILIHCVFS\", \"BILIDIR\"   Lab Results   Component Value Date    ALBUMINELP 4.2 02/18/2020    Lab Results   Component Value Date    LACTATE 1.1 04/01/2024 4/4/24 EGD (done for abd pain and bloating):  Impression  Bezoar in the body of the stomach  Mild abnormal mucosa with plaque in the upper third of the esophagus and lower third of the esophagus; " collected sample to send to pathology with brush  The duodenum appeared normal. Performed random biopsy to rule out celiac disease.  Final Cytological Interpretation      A. ESOPHAGUS DISTAL , BRUSH CYTOLOGY:                                              No malignant cells identified                                Fungal organisms morphologically consistent with Candida spp.     FINAL DIAGNOSIS   A. DUODENUM SECOND PART BIOPSY:   Duodenal mucosa with no significant pathologic abnormality     B. STOMACH ANTRUM BIOPSY:   Reactive gastropathy  No Helicobacter pylori organisms identified on H&E stain       4/3/24 C-scope (done for abd pain and bloating):  Impression  The terminal ileum, ileocecal valve and cecum appeared normal. Performed random biopsy using biopsy forceps.  FINAL DIAGNOSIS   A. TERMINAL ILEUM BIOPSY:   -- Small intestinal mucosa with no significant pathological findings.     B. COLON  - RANDOM BIOPSY:      -- Fragments of colonic mucosa with no significant pathological findings.  See note.     Note: A separate small fragment of mature fibroadipose tissue is noted.       7/8/24 Gastric emptying (done for abd pain and bloating):  IMPRESSION:  1. Normal gastric emptying of solids without evidence of  gastroparesis.    3/31/23 CT AP w/contrast:  IMPRESSION:  No evidence of acute abnormality of the abdominal viscera.  No evidence of bowel obstruction or acute appendicitis.      Assessment/Plan   Ms. Kelly is a 25yo female with hx of SLE, recent candida esophagitis (dxed 4/2024) establishing care at GI clinic for abdominal bloating and weight gain. Will plan to evaluate for food sensitivities. Pt is also at risk for SIBO due to continuous inflammatory process from SLE. Structural issues or inflammatory process (ie: IBD) less likely with recent normal CTAP and no abnormalities noted on EGD/Cscope in 4/2024. Motility issues also less likely given normal gastric emptying test in 7/2024. In terms of her  weight gain, there are multiple potential factors (diet, lifestlye changes, OCPs), however pt may benefit from evaluation for Cushings and repeat evaluation of thyroid function with her PCP (pts mother mentioned pt was very hypertensive [id number] during her ED visits for abdominal pain. And although pain can cause htn, SBPs of 190s seem out of proportion for a pt in her 20's).    #abdominal bloating  ::EGD 4/2024: Beozar. Mucosal irregularities, brushed. Brushings positive for Candida. Bxs negative  ::Cscope 4/2024: normal. Bx negative   ::Gastric emptying test 7/2024:  ::Hpylori breath test 7/2023: neg  -Nutrition consult to help with FODMAP diet  -Discussed increasing physical activity after eating, avoiding gas promoting foods  -Hydrogen breath test to evaluate for SIBO    #weight gain  -pt may benefit benefit from evaluation for Cushings and repeat evaluation of thyroid function with her PCP (pts mother mentioned pt was very hypertensive [id number] during her ED visits for abdominal pain. And although pain can cause htn, SBPs of 190s seem out of proportion for a pt in her 20's).    #candida esophagitis s/p 10day course of diflucan    RTC 3mo

## 2024-07-11 NOTE — PATIENT INSTRUCTIONS
-Try taking a short walk (15min) after eating to help the natural movement of your GI tract  -We placed a consult order for a nutritionist help with carrying out the FODMAP diet in addition to your weight gain  -Please schedule your hydrogen breath test (this is to test for a condition called Small Bowel Bacterial Overgrowth)    Please come back to clinic in 3mo  Thank you

## 2024-07-17 NOTE — PROGRESS NOTES
I saw and evaluated the patient. I personally obtained the key and critical portions of the history and physical exam or was physically present for key and critical portions performed by the resident/fellow. I reviewed the resident/fellow's documentation and discussed the patient with the resident/fellow. I agree with the resident/fellow's medical decision making as documented in the note.    I spent 15 minutes in the professional and overall care of this patient.    Caitie Campbell MD

## 2024-07-18 ENCOUNTER — APPOINTMENT (OUTPATIENT)
Dept: PHYSICAL THERAPY | Facility: CLINIC | Age: 24
End: 2024-07-18
Payer: COMMERCIAL

## 2024-07-23 ENCOUNTER — APPOINTMENT (OUTPATIENT)
Dept: PHYSICAL THERAPY | Facility: CLINIC | Age: 24
End: 2024-07-23
Payer: COMMERCIAL

## 2024-07-24 ENCOUNTER — LAB (OUTPATIENT)
Dept: LAB | Facility: LAB | Age: 24
End: 2024-07-24
Payer: COMMERCIAL

## 2024-07-24 ENCOUNTER — APPOINTMENT (OUTPATIENT)
Dept: RHEUMATOLOGY | Facility: CLINIC | Age: 24
End: 2024-07-24
Payer: COMMERCIAL

## 2024-07-24 VITALS
HEIGHT: 69 IN | DIASTOLIC BLOOD PRESSURE: 82 MMHG | SYSTOLIC BLOOD PRESSURE: 117 MMHG | BODY MASS INDEX: 29.18 KG/M2 | HEART RATE: 85 BPM | WEIGHT: 197 LBS

## 2024-07-24 DIAGNOSIS — L93.2 CUTANEOUS LUPUS ERYTHEMATOSUS: ICD-10-CM

## 2024-07-24 DIAGNOSIS — L93.2 CUTANEOUS LUPUS ERYTHEMATOSUS: Primary | ICD-10-CM

## 2024-07-24 LAB
ALBUMIN SERPL BCP-MCNC: 4.3 G/DL (ref 3.4–5)
ALP SERPL-CCNC: 90 U/L (ref 33–110)
ALT SERPL W P-5'-P-CCNC: 15 U/L (ref 7–45)
ANION GAP SERPL CALC-SCNC: 12 MMOL/L (ref 10–20)
AST SERPL W P-5'-P-CCNC: 19 U/L (ref 9–39)
BASOPHILS # BLD AUTO: 0.03 X10*3/UL (ref 0–0.1)
BASOPHILS NFR BLD AUTO: 0.5 %
BILIRUB SERPL-MCNC: 0.5 MG/DL (ref 0–1.2)
BUN SERPL-MCNC: 11 MG/DL (ref 6–23)
CALCIUM SERPL-MCNC: 9.2 MG/DL (ref 8.6–10.3)
CHLORIDE SERPL-SCNC: 102 MMOL/L (ref 98–107)
CO2 SERPL-SCNC: 29 MMOL/L (ref 21–32)
CREAT SERPL-MCNC: 0.8 MG/DL (ref 0.5–1.05)
CRP SERPL-MCNC: 0.49 MG/DL
EGFRCR SERPLBLD CKD-EPI 2021: >90 ML/MIN/1.73M*2
EOSINOPHIL # BLD AUTO: 0.13 X10*3/UL (ref 0–0.7)
EOSINOPHIL NFR BLD AUTO: 2.3 %
ERYTHROCYTE [DISTWIDTH] IN BLOOD BY AUTOMATED COUNT: 12.6 % (ref 11.5–14.5)
ERYTHROCYTE [SEDIMENTATION RATE] IN BLOOD BY WESTERGREN METHOD: 11 MM/H (ref 0–20)
GLUCOSE SERPL-MCNC: 85 MG/DL (ref 74–99)
HCT VFR BLD AUTO: 42.2 % (ref 36–46)
HGB BLD-MCNC: 13.7 G/DL (ref 12–16)
IMM GRANULOCYTES # BLD AUTO: 0.01 X10*3/UL (ref 0–0.7)
IMM GRANULOCYTES NFR BLD AUTO: 0.2 % (ref 0–0.9)
LYMPHOCYTES # BLD AUTO: 1.44 X10*3/UL (ref 1.2–4.8)
LYMPHOCYTES NFR BLD AUTO: 25.1 %
MCH RBC QN AUTO: 27.3 PG (ref 26–34)
MCHC RBC AUTO-ENTMCNC: 32.5 G/DL (ref 32–36)
MCV RBC AUTO: 84 FL (ref 80–100)
MONOCYTES # BLD AUTO: 0.42 X10*3/UL (ref 0.1–1)
MONOCYTES NFR BLD AUTO: 7.3 %
NEUTROPHILS # BLD AUTO: 3.71 X10*3/UL (ref 1.2–7.7)
NEUTROPHILS NFR BLD AUTO: 64.6 %
NRBC BLD-RTO: 0 /100 WBCS (ref 0–0)
PLATELET # BLD AUTO: 244 X10*3/UL (ref 150–450)
POTASSIUM SERPL-SCNC: 4.2 MMOL/L (ref 3.5–5.3)
PROT SERPL-MCNC: 7 G/DL (ref 6.4–8.2)
RBC # BLD AUTO: 5.02 X10*6/UL (ref 4–5.2)
SODIUM SERPL-SCNC: 139 MMOL/L (ref 136–145)
WBC # BLD AUTO: 5.7 X10*3/UL (ref 4.4–11.3)

## 2024-07-24 PROCEDURE — 80053 COMPREHEN METABOLIC PANEL: CPT

## 2024-07-24 PROCEDURE — 86160 COMPLEMENT ANTIGEN: CPT

## 2024-07-24 PROCEDURE — 86235 NUCLEAR ANTIGEN ANTIBODY: CPT

## 2024-07-24 PROCEDURE — 36415 COLL VENOUS BLD VENIPUNCTURE: CPT

## 2024-07-24 PROCEDURE — 86038 ANTINUCLEAR ANTIBODIES: CPT

## 2024-07-24 PROCEDURE — 86140 C-REACTIVE PROTEIN: CPT

## 2024-07-24 PROCEDURE — 99214 OFFICE O/P EST MOD 30 MIN: CPT | Performed by: INTERNAL MEDICINE

## 2024-07-24 PROCEDURE — 85652 RBC SED RATE AUTOMATED: CPT

## 2024-07-24 PROCEDURE — 3008F BODY MASS INDEX DOCD: CPT | Performed by: INTERNAL MEDICINE

## 2024-07-24 PROCEDURE — 82657 ENZYME CELL ACTIVITY: CPT

## 2024-07-24 PROCEDURE — 86225 DNA ANTIBODY NATIVE: CPT

## 2024-07-24 PROCEDURE — 83789 MASS SPECTROMETRY QUAL/QUAN: CPT

## 2024-07-24 PROCEDURE — 85025 COMPLETE CBC W/AUTO DIFF WBC: CPT

## 2024-07-24 RX ORDER — MYCOPHENOLATE MOFETIL 250 MG/1
250 CAPSULE ORAL 2 TIMES DAILY
Qty: 60 CAPSULE | Refills: 3 | Status: SHIPPED | OUTPATIENT
Start: 2024-07-24 | End: 2025-07-24

## 2024-07-24 ASSESSMENT — ENCOUNTER SYMPTOMS
MYALGIAS: 1
HEADACHES: 1
FATIGUE: 1
SHORTNESS OF BREATH: 0

## 2024-07-24 NOTE — PROGRESS NOTES
Subjective   Patient ID: Allyssa Kelly is a 24 y.o. female who presents for Follow-up (4 month follow up /Feet and new rashes ).  HPI  Subjective   Patient ID: Allyssa Kelly is a 23 y.o. female who presents for Joint Pain (Follow up ).  HPI  Patient with history of cutaneous lupus with negative ISRRAEL/BROOKS panel.  Previously had discontinued hydroxychloroquine due to GI symptoms.  Shortly after she discontinued hydroxychloroquine had an episode of angioedema in which her eyes and lip had swollen in October 2023.    I had last seen her in March with her mother.    Patient had been in the emergency room with abdominal pain. She had just been in the hospital with abdominal pain.  I had been questioned if they thought that she might have angioedema in her abdomen as she had an episode of that last October.    Patient has not had any episodes of angioedema since October.    She has had some rashes.  She was out in [place] and had some redness over her cheeks that look like a malar rash.  Also had some redness on her legs as well.  She tries to practice sun avoidance.    She has just been generally achy.    In the last year she has had a lot of weight gain and uncertain why.    At her last visit I told her to taper off of prednisone.  When she was hospitalized she had been put on prednisone thinking that it might be a lupus flare which I was uncertain of.  She presented to the emergency room a few days after I had seen her with again abdominal pain.  She had seen her OB later to take her IUD out..      Later in the month she presented with headache.  It was just given treatment and discharge.  She appeared a few days later again with abdominal pain.  She again was hospitalized.  She did have endoscopy that did show a bezoar and findings consistent with Candida esophagitis.  She has a test for SIBO.    She has also been following with podiatry.  She has had previous surgery and has a lot of stiffness in her  "ankle.  Has decreased range of motion in the right leg.      Physical Exam  Physical Exam  GEN: NAD A&O x3 appropriate affect  SKIN: No current swelling in her lips or face.  No rashes seen on exposed skin  MSK no current swelling or tenderness of any of her joints      Review of Systems   Constitutional:  Positive for fatigue.        Gaining weight   HENT:          No oral ulcers no dry mouth   Eyes:         No dry   Respiratory:  Negative for shortness of breath.    Cardiovascular:  Negative for chest pain.   Gastrointestinal:         Abdominal symptoms are better   Musculoskeletal:  Positive for myalgias.        Feels achy.  She feels that she has had joint swelling   Skin:         Positive sun sensitivity.  No current rashes but shows pictures that she had a rash on her legs   Neurological:  Positive for headaches.       Assessment/Plan   Still questionable if she has systemic lupus.  In the past she has had a history of cutaneous lupus.  Has had side effects with hydroxychloroquine and possibly also with methotrexate.  Had an episode of angioedema after she discontinued hydroxychloroquine and has never reoccurred.    Had abdominal pain that lasted for several weeks.  EGD had shown bezoar, Candida esophagitis but had been on prednisone for \"lupus flare\".      Has had negative ISRRAEL and BROOKS panel, sun sensitive rashes, myalgias and arthralgias but no other blood work findings.    Patient and her mother indicate that they will be changing their insurance as they are going to start working at [employer] [facility] and seeing new providers.    Will redo her blood work    Consider the use of CellCept 250 mg twice daily discussed side effects of medication      I do not feel that she has systemic lupus.  Has negative ISRRAEL/BROOKS panel.  Has had history of cutaneous lupus and had side effects with hydroxychloroquine with increase in bloating.    Has only had 1 episode of angioedema in which she had eye and lip swelling.    Her " mother adds that she has had facial fullness/swelling but not quite angioedema for years.  This seems that when she feels like a flare of her skin lupus is occurring that she gets more general swelling.    Speculation that she could have had a ruptured ovarian cyst that could have given her pain since there was free fluid seen on her ultrasound    Will like her to quickly taper off her prednisone.  She is currently on 20 mg and I told her to do 10 mg for 2 days and then 5 mg for 2 days and off.    Uncertain of methotrexate to see.  Currently on 10 mg.  Started in December.  We can increase it to 15 mg as she was still having some skin rash.  Her right lower abdominal pain has resolved.      Reviewed her blood work.  Will have her come back again in 4 months

## 2024-07-25 ENCOUNTER — APPOINTMENT (OUTPATIENT)
Dept: PHYSICAL THERAPY | Facility: CLINIC | Age: 24
End: 2024-07-25
Payer: COMMERCIAL

## 2024-07-25 LAB
C3 SERPL-MCNC: 159 MG/DL (ref 87–200)
C4 SERPL-MCNC: 34 MG/DL (ref 10–50)
CENTROMERE B AB SER-ACNC: <0.2 AI
CHROMATIN AB SERPL-ACNC: <0.2 AI
DSDNA AB SER-ACNC: 1 IU/ML
ENA JO1 AB SER QL IA: <0.2 AI
ENA RNP AB SER IA-ACNC: 0.5 AI
ENA SCL70 AB SER QL IA: <0.2 AI
ENA SM AB SER IA-ACNC: <0.2 AI
ENA SM+RNP AB SER QL IA: <0.2 AI
ENA SS-A AB SER IA-ACNC: <0.2 AI
ENA SS-B AB SER IA-ACNC: <0.2 AI
RIBOSOMAL P AB SER-ACNC: <0.2 AI

## 2024-07-26 LAB — ANA SER QL HEP2 SUBST: NEGATIVE

## 2024-07-29 LAB — TPMT BLD-CCNC: 24.5 U/ML (ref 24–44)

## 2024-07-31 ENCOUNTER — APPOINTMENT (OUTPATIENT)
Dept: PRIMARY CARE | Facility: CLINIC | Age: 24
End: 2024-07-31
Payer: COMMERCIAL

## 2024-07-31 VITALS
HEIGHT: 69 IN | WEIGHT: 194.4 LBS | HEART RATE: 90 BPM | DIASTOLIC BLOOD PRESSURE: 85 MMHG | BODY MASS INDEX: 28.79 KG/M2 | SYSTOLIC BLOOD PRESSURE: 114 MMHG | OXYGEN SATURATION: 97 %

## 2024-07-31 DIAGNOSIS — N91.5 OLIGOMENORRHEA, UNSPECIFIED TYPE: Primary | ICD-10-CM

## 2024-07-31 PROCEDURE — 99212 OFFICE O/P EST SF 10 MIN: CPT | Performed by: NURSE PRACTITIONER

## 2024-07-31 PROCEDURE — 3008F BODY MASS INDEX DOCD: CPT | Performed by: NURSE PRACTITIONER

## 2024-07-31 PROCEDURE — 1036F TOBACCO NON-USER: CPT | Performed by: NURSE PRACTITIONER

## 2024-07-31 NOTE — PROGRESS NOTES
"Subjective   Chief Complaint   Patient presents with    Follow-up     Patient is here because she saw her GI  And they recommended she come back here and be tested for cushings disease and have her BP checked.       Patient ID: Allyssa Kelly is a 24 y.o. female who presents for Follow-up (Patient is here because she saw her GI  And they recommended she come back here and be tested for cushings disease and have her BP checked.).    HPI  Allyssa is a 23 yo female presenting today for concerns about Cushings Ds   Pt was seen by Rheum, Dr. Boateng last week     Pt states she had f/u with [facility], Dr. Weber on 7/11/24 and he suggested she be seen by PCP for ? Cushings and to check her BP   Pt feels she may have Cushings Ds due to irregular periods her entire life and \"round face/weight gain\"   Family Hx: denies     Pt had her IUD removed in May 2024  Has been getting monthly periods   Although she states they are heavy for 2 days, then spot for a few days, then she will get another one before she is due   She has never had a period last longer than 2 weeks   She states she historically only gets about 7-9 periods per year    Pt has switched to working at [employer] and will need to est with new providers this fall          Allergies   Allergen Reactions    Methotrexate Other     Abdominal bloating/distention (not allergic just sensitive)    Tretinoin Rash       Review of Systems  ROS was completed and all systems are negative with the exception of what was noted in the the HPI.       Objective   /85   Pulse 90   Ht 1.753 m (5' 9\")   Wt 88.2 kg (194 lb 6.4 oz)   SpO2 97%   BMI 28.71 kg/m²      Current Outpatient Medications   Medication Instructions    ascorbic acid, vitamin C, 500 mg capsule 1 tablet, oral, Daily, TAKE PER DIRECTED    B complex-vitamin C-folic acid (Nephro-[facility] Rx) 1- mg-mg-mcg tablet 1 tablet, oral, TAKE PER DIRECTED    cholecalciferol (VITAMIN D-3) 25 mcg, oral, Daily, TAKE PER DIRECTED "    cloNIDine ER (KAPVAY) 0.2 mg, oral, Nightly    drospirenone, contraceptive, (Slynd) 4 mg (28) tablet 1 tablet, oral, Daily    FLUoxetine (PROzac) 40 mg capsule TAKE 1 CAPSULE BY MOUTH ONCE DAILY    mirtazapine (REMERON) 7.5 mg, oral, Nightly    MULTIVITAMIN-FERROUS FUMARATE-FOLIC ACID 9 MG-200 MCG TABLET, HALF TABLET, (Centrum) 1 tablet, oral, Daily    mycophenolate (CELLCEPT) 250 mg, oral, 2 times daily    ondansetron ODT (ZOFRAN-ODT) 4 mg, oral, Every 8 hours PRN    rizatriptan (MAXALT) 10 mg, oral, Daily PRN         Physical Exam  Vitals reviewed.   Pulmonary:      Effort: Pulmonary effort is normal.   Neurological:      Mental Status: She is alert and oriented to person, place, and time.   Psychiatric:         Mood and Affect: Mood normal.         Assessment & Plan  Oligomenorrhea, unspecified type    Orders:    Salivary Cortisol; Future    Salivary Cortisol; Future

## 2024-08-08 DIAGNOSIS — R10.84 GENERALIZED ABDOMINAL PAIN: ICD-10-CM

## 2024-08-08 DIAGNOSIS — G43.109 MIGRAINE WITH AURA AND WITHOUT STATUS MIGRAINOSUS, NOT INTRACTABLE: Primary | ICD-10-CM

## 2024-08-08 RX ORDER — DROSPIRENONE 4 MG/1
4 TABLET, FILM COATED ORAL DAILY
Qty: 90 TABLET | Refills: 0 | Status: SHIPPED | OUTPATIENT
Start: 2024-08-08 | End: 2024-11-06

## 2024-08-08 RX ORDER — RIZATRIPTAN BENZOATE 10 MG/1
10 TABLET ORAL DAILY PRN
Qty: 9 TABLET | Refills: 11 | Status: SHIPPED | OUTPATIENT
Start: 2024-08-08

## 2024-08-14 DIAGNOSIS — F95.9 TIC DISORDER: ICD-10-CM

## 2024-08-16 RX ORDER — CLONIDINE HYDROCHLORIDE 0.1 MG/1
0.2 TABLET, EXTENDED RELEASE ORAL NIGHTLY
Qty: 180 TABLET | Refills: 1 | Status: SHIPPED | OUTPATIENT
Start: 2024-08-16 | End: 2025-02-12

## 2024-08-17 ENCOUNTER — HOSPITAL ENCOUNTER (EMERGENCY)
Facility: HOSPITAL | Age: 24
Discharge: HOME | End: 2024-08-17
Payer: COMMERCIAL

## 2024-08-17 ENCOUNTER — APPOINTMENT (OUTPATIENT)
Dept: RADIOLOGY | Facility: HOSPITAL | Age: 24
End: 2024-08-17
Payer: COMMERCIAL

## 2024-08-17 VITALS
WEIGHT: 194 LBS | HEART RATE: 93 BPM | BODY MASS INDEX: 28.73 KG/M2 | DIASTOLIC BLOOD PRESSURE: 75 MMHG | TEMPERATURE: 98.1 F | RESPIRATION RATE: 18 BRPM | HEIGHT: 69 IN | SYSTOLIC BLOOD PRESSURE: 116 MMHG | OXYGEN SATURATION: 100 %

## 2024-08-17 DIAGNOSIS — S09.90XA HEAD INJURY, INITIAL ENCOUNTER: Primary | ICD-10-CM

## 2024-08-17 DIAGNOSIS — S89.91XA INJURY OF RIGHT KNEE, INITIAL ENCOUNTER: ICD-10-CM

## 2024-08-17 PROCEDURE — 73564 X-RAY EXAM KNEE 4 OR MORE: CPT | Mod: RT

## 2024-08-17 PROCEDURE — 70450 CT HEAD/BRAIN W/O DYE: CPT | Performed by: RADIOLOGY

## 2024-08-17 PROCEDURE — 72125 CT NECK SPINE W/O DYE: CPT

## 2024-08-17 PROCEDURE — 73564 X-RAY EXAM KNEE 4 OR MORE: CPT | Mod: RIGHT SIDE | Performed by: RADIOLOGY

## 2024-08-17 PROCEDURE — 2500000005 HC RX 250 GENERAL PHARMACY W/O HCPCS: Performed by: NURSE PRACTITIONER

## 2024-08-17 PROCEDURE — 99285 EMERGENCY DEPT VISIT HI MDM: CPT | Mod: 25

## 2024-08-17 PROCEDURE — 70450 CT HEAD/BRAIN W/O DYE: CPT

## 2024-08-17 PROCEDURE — 72125 CT NECK SPINE W/O DYE: CPT | Performed by: RADIOLOGY

## 2024-08-17 RX ORDER — ONDANSETRON 4 MG/1
4 TABLET, ORALLY DISINTEGRATING ORAL ONCE
Status: COMPLETED | OUTPATIENT
Start: 2024-08-17 | End: 2024-08-17

## 2024-08-17 RX ORDER — ONDANSETRON 4 MG/1
4 TABLET, FILM COATED ORAL EVERY 6 HOURS PRN
Qty: 15 TABLET | Refills: 0 | Status: SHIPPED | OUTPATIENT
Start: 2024-08-17

## 2024-08-17 RX ADMIN — ONDANSETRON 4 MG: 4 TABLET, ORALLY DISINTEGRATING ORAL at 12:14

## 2024-08-17 ASSESSMENT — PAIN DESCRIPTION - PAIN TYPE: TYPE: ACUTE PAIN

## 2024-08-17 ASSESSMENT — PAIN SCALES - GENERAL: PAINLEVEL_OUTOF10: 7

## 2024-08-17 ASSESSMENT — PAIN DESCRIPTION - ORIENTATION: ORIENTATION: RIGHT

## 2024-08-17 ASSESSMENT — PAIN DESCRIPTION - LOCATION: LOCATION: HEAD

## 2024-08-17 ASSESSMENT — PAIN DESCRIPTION - ONSET: ONSET: SUDDEN

## 2024-08-17 ASSESSMENT — PAIN DESCRIPTION - DESCRIPTORS: DESCRIPTORS: THROBBING;STABBING

## 2024-08-17 ASSESSMENT — COLUMBIA-SUICIDE SEVERITY RATING SCALE - C-SSRS
6. HAVE YOU EVER DONE ANYTHING, STARTED TO DO ANYTHING, OR PREPARED TO DO ANYTHING TO END YOUR LIFE?: NO
1. IN THE PAST MONTH, HAVE YOU WISHED YOU WERE DEAD OR WISHED YOU COULD GO TO SLEEP AND NOT WAKE UP?: NO
2. HAVE YOU ACTUALLY HAD ANY THOUGHTS OF KILLING YOURSELF?: NO

## 2024-08-17 ASSESSMENT — PAIN DESCRIPTION - PROGRESSION: CLINICAL_PROGRESSION: NOT CHANGED

## 2024-08-17 ASSESSMENT — PAIN DESCRIPTION - FREQUENCY: FREQUENCY: CONSTANT/CONTINUOUS

## 2024-08-17 ASSESSMENT — PAIN - FUNCTIONAL ASSESSMENT: PAIN_FUNCTIONAL_ASSESSMENT: 0-10

## 2024-08-17 NOTE — ED PROVIDER NOTES
HPI   Chief Complaint   Patient presents with    Head Injury     I was racing the dog and tripped and fell down 5 stairs injured the back of my head and rt knee the pain is stabbing and throbbing no lose of consciousness no  nausea and vomiting       HPI  See my MDM      Patient History   Past Medical History:   Diagnosis Date    Acute pharyngitis, unspecified 07/21/2014    Sore throat    Acute pharyngitis, unspecified 04/18/2017    Sore throat    Acute tonsillitis, unspecified 03/22/2021    Acute tonsillitis, unspecified    Allergy, unspecified, initial encounter 10/08/2021    Hypersensitivity    Anxiety     Arthralgia of temporomandibular joint, unspecified side 04/07/2014    Temporomandibular joint pain    Body mass index (BMI) 19.9 or less, adult 09/17/2021    Body mass index (BMI) of 19.9 or less in adult    Body mass index (BMI) pediatric, 5th percentile to less than 85th percentile for age 09/17/2021    BMI (body mass index), pediatric, 5% to less than 85% for age    Calculus of kidney with calculus of ureter 02/12/2022    Calculus of kidney with calculus of ureter    Candidiasis of skin and nail 08/31/2020    Candidal skin infection    Cellulitis of left toe 04/06/2018    Cellulitis of fifth toe of left foot    Contact with and (suspected) exposure to other viral communicable diseases 02/13/2020    Exposure to influenza    COVID-19 12/30/2020    COVID-19 virus infection    Displaced fracture of shaft of third metacarpal bone, left hand, initial encounter for closed fracture 12/05/2016    Closed displaced fracture of shaft of third metacarpal bone of left hand, initial encounter    Encounter for general adult medical examination without abnormal findings 09/17/2021    Examination, routine, over 18 years of age    Encounter for pre-employment examination 09/17/2021    Encounter for pre-employment health screening examination    Encounter for routine child health examination without abnormal findings 10/03/2018     Encounter for routine child health examination without abnormal findings    Hordeolum externum right upper eyelid 11/19/2021    Hordeolum externum of right upper eyelid    Jaw pain 10/07/2020    Chronic jaw pain    Joint derangement, unspecified 10/15/2021    Hypermobility of joint    Local infection of the skin and subcutaneous tissue, unspecified 04/26/2022    Infection of skin of finger    Localized swelling, mass and lump, head 04/28/2017    Tongue swelling    Nonscarring hair loss, unspecified 10/03/2018    Hair thinning    Other chronic diseases of tonsils and adenoids 04/28/2017    Cryptic tonsil    Other conditions influencing health status 11/18/2021    Encounter for procedure    Other diseases of tongue 04/28/2017    Tongue lesion    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    Other specified respiratory disorders 02/18/2019    Viral respiratory illness    Pain in left arm 11/22/2021    Pain of left upper extremity    Pain in right ankle and joints of right foot 09/23/2021    Right ankle pain    Pain in right wrist 06/08/2016    Right wrist pain    Pain in unspecified knee 09/30/2014    Joint pain, knee    Pain in unspecified shoulder 10/27/2014    Shoulder pain    Pain in unspecified wrist 11/22/2021    Chronic wrist pain    Panic attacks     Personal history of diseases of the skin and subcutaneous tissue 08/12/2017    History of telogen effluvium    Personal history of diseases of the skin and subcutaneous tissue 12/30/2020    History of pilonidal cyst    Personal history of diseases of the skin and subcutaneous tissue 04/06/2015    History of contact dermatitis    Personal history of other (healed) physical injury and trauma 11/19/2018    History of insect bite    Personal history of other diseases of the digestive system 02/13/2020    History of gastroenteritis    Personal history of other diseases of the female genital tract 08/12/2017    History of dysmenorrhea     Personal history of other diseases of the musculoskeletal system and connective tissue 12/14/2017    History of low back pain    Personal history of other diseases of the musculoskeletal system and connective tissue 05/10/2022    History of muscle pain    Personal history of other diseases of the respiratory system 11/19/2021    History of sore throat    Personal history of other diseases of the respiratory system 01/17/2017    History of acute sinusitis    Personal history of other diseases of the respiratory system 02/18/2019    History of sore throat    Personal history of other diseases of the respiratory system 07/21/2014    History of pharyngitis    Personal history of other diseases of the respiratory system 01/07/2015    History of influenza    Personal history of other diseases of the respiratory system 01/17/2017    History of acute sinusitis    Personal history of other diseases of urinary system 02/12/2022    History of hematuria    Personal history of other infectious and parasitic diseases 07/21/2014    History of viral infection    Personal history of other specified conditions 02/12/2022    History of dysuria    Personal history of other specified conditions 02/12/2022    History of dysuria    Personal history of other specified conditions 09/20/2021    History of insomnia    Personal history of other specified conditions 10/15/2021    History of urinary urgency    Personal history of other specified conditions 10/15/2021    History of urinary frequency    Personal history of other specified conditions 04/05/2021    History of weight loss    Personal history of other specified conditions 02/18/2019    History of fever    Personal history of other specified conditions 03/15/2021    History of nasal congestion    Personal history of other specified conditions 02/21/2020    History of diarrhea    Personal history of other specified conditions 12/01/2016    History of abdominal pain    Personal  history of other specified conditions 01/07/2015    History of vomiting    Personal history of other specified conditions 01/07/2015    History of fever    PONV (postoperative nausea and vomiting)     WEARS PATCH    Premenstrual dysphoric disorder 01/25/2016    PMDD (premenstrual dysphoric disorder)    Raised antibody titer 02/24/2020    Elevated EBV antibody titer    Right lower quadrant pain 02/21/2020    Bilateral lower abdominal pain    Sprain of interphalangeal joint of right middle finger, initial encounter 03/06/2018    Sprain of interphalangeal joint of right middle finger, initial encounter    Strain of unspecified muscle(s) and tendon(s) at lower leg level, left leg, initial encounter 12/10/2018    Strain of left knee    Superficial mycosis, unspecified 10/17/2015    Fungal rash of trunk    Tic disorder, unspecified 11/01/2021    Tic disorder    TMJ (dislocation of temporomandibular joint)     Unspecified abdominal pain 12/01/2016    Abdominal pain, acute    Unspecified acute conjunctivitis, left eye 12/01/2016    Acute conjunctivitis, left eye    Unspecified conjunctivitis 11/13/2015    Conjunctivitis, left eye    Unspecified fracture of left wrist and hand, initial encounter for closed fracture 12/29/2016    Fracture of left hand, closed, initial encounter    Unspecified injury of left wrist, hand and finger(s), initial encounter 12/03/2016    Injury of left hand, initial encounter    Unspecified injury of right lower leg, initial encounter 12/07/2015    Right knee injury    Unspecified injury of right wrist, hand and finger(s), initial encounter 03/28/2017    Injury of right hand, initial encounter    Unspecified injury of right wrist, hand and finger(s), initial encounter 03/06/2018    Injury of finger of right hand, initial encounter    Unspecified injury of unspecified foot, initial encounter 05/02/2015    Foot injury    Unspecified injury of unspecified wrist, hand and finger(s), initial encounter      Finger injury    Unspecified renal colic 02/12/2022    Ureteral colic    Vomiting, unspecified 02/25/2020    Intermittent vomiting     Past Surgical History:   Procedure Laterality Date    OTHER SURGICAL HISTORY  12/11/2020    Foot surgery    OTHER SURGICAL HISTORY  10/19/2020    Wrist surgery    OTHER SURGICAL HISTORY  03/25/2022    Temporomandibular joint surgery    OTHER SURGICAL HISTORY  09/16/2020    Surgery    OTHER SURGICAL HISTORY  05/07/2021    Arthrocentesis (Therapeutic)     Family History   Problem Relation Name Age of Onset    Anxiety disorder Mother      Hodgkin's lymphoma Mother      Breast cancer Mother      Hypertension Father      Other (PATENT DUCTUS ARTERIOSUS) Father      Other (TIC DISORDER) Father      Lupus Father's Sister      Anxiety disorder Father's Brother      Anxiety disorder Maternal Grandmother      Anxiety disorder Maternal Grandfather      Lupus Paternal Grandmother      Hypertension Paternal Grandfather       Social History     Tobacco Use    Smoking status: Never     Passive exposure: Never    Smokeless tobacco: Never   Vaping Use    Vaping status: Never Used   Substance Use Topics    Alcohol use: Yes     Comment: social    Drug use: Never       Physical Exam   ED Triage Vitals [08/17/24 1155]   Temperature Heart Rate Respirations BP   36.7 °C (98.1 °F) 93 18 116/75      Pulse Ox Temp Source Heart Rate Source Patient Position   100 % Oral Monitor Sitting      BP Location FiO2 (%)     Left arm --       Physical Exam  CONSTITUTIONAL: Vital signs reviewed as charted, well-developed and in no distress  Eyes: Extraocular muscles are intact. Pupils  Are enlarged, equal round and reactive to light. Conjunctiva are pink.    ENT: Mucous membranes are moist. Tongue in the midline. Pharynx was without erythema or exudates, uvula midline  LUNGS: Breath sounds equal and clear to auscultation. Good air exchange, no wheezes rales or retractions, pulse oximetry is charted.  HEART: Regular  rate and rhythm without murmur thrill or rub, strong tones, auscultation is normal.  ABDOMEN: Soft and nontender without guarding rebound rigidity or mass. Bowel sounds are present and normal in all quadrants. There is no palpable masses or aneurysms identified. No hepatosplenomegaly, normal abdominal exam.  Neuro: The patient is awake, alert and oriented ×3. Moving all 4 extremities and answering questions appropriately.   MUSCULOSKELETAL: Exam of the right knee shows mild edema specifically medially tenderness palpation of this area.  No obvious deformity.  Motor sensation pulses are intact.   PSYCH: Awake alert oriented, normal mood and affect.  Skin:  Dry, normal color, warm to the touch, no rash present.        ED Course & MDM   Diagnoses as of 08/17/24 1338   Head injury, initial encounter   Injury of right knee, initial encounter                 No data recorded     Yuri Coma Scale Score: 15 (08/17/24 1151 : Mario Fulton, EMT)                           Medical Decision Making  History obtained from: patient    Vital signs, nursing notes, current medications, past medical history, Surgical history, allergies, social history, family History were reviewed.         HPI:  Patient 24-year-old female presenting emergency room today complaining of right knee injury and closed head injury.  States last night was walking on the stairs her dog tripped her she fell down 5 steps.  Injured her right knee and hit her head.  No loss of consciousness.  Does not take anticoagulants.  States she has been very sensitive to light since and has been nauseous but no vomiting.  Denies extremity weakness or tingling.  Denies change in vision.  She is nontoxic-appearing vital signs within normal limits.      10 point ROS was reviewed and negative except Noted above in HPI.  DDX: as listed above          MDM Summary/considerations:  Labs Reviewed - No data to display  CT head wo IV contrast   Final Result   CT HEAD:   1. No acute  intracranial abnormality or calvarial fracture.        CT CERVICAL SPINE:   1. No acute fracture or traumatic malalignment of the cervical spine.        MACRO:   None.        Signed by: Erin Del Toro 8/17/2024 1:24 PM   Dictation workstation:   [id number]      CT cervical spine wo IV contrast   Final Result   CT HEAD:   1. No acute intracranial abnormality or calvarial fracture.        CT CERVICAL SPINE:   1. No acute fracture or traumatic malalignment of the cervical spine.        MACRO:   None.        Signed by: Erin Del Toro 8/17/2024 1:24 PM   Dictation workstation:   [id number]      XR knee right 4+ views   Final Result   Normal radiographs right knee.        Signed by: Gianni Lamas 8/17/2024 1:02 PM   Dictation workstation:   [id number]        Medications   ondansetron ODT (Zofran-ODT) disintegrating tablet 4 mg (4 mg oral Given 8/17/24 1214)     New Prescriptions    ONDANSETRON (ZOFRAN) 4 MG TABLET    Take 1 tablet (4 mg) by mouth every 6 hours if needed for nausea or vomiting.     I estimate there is a low risk for subarachnoid hemorrhage, cavernous sinus venous thrombosis, meningitis, intracranial hemorrhage, subdural hematoma, or stroke, thus I considered the discharge disposition reasonable. We have discussed the diagnosis and risks, and we agreed with discharging home to follow-up with her primary care doctor. We also discussed returning to the emergency department immediately if new or worsening symptoms occur. We have discussed the symptoms which are most concerning such as changing or worsening pain, weakness, vomiting, or fever and necessitate immediate return.    CT scan of the head and neck were grossly unremarkable for acute process, x-ray of the knee showed no acute fracture.  Patient will be discharged home given orthopedic referral.  Discussed using acetaminophen and ibuprofen.  Discussed physical and mental rest for treatment of concussion.  Highly recommended following with her PCP 1  to 2 days for reevaluation.  He was discharged home stable condition.  Work note given.    All of the patient's questions were answered to the best of my ability.  Patient states understanding that they have been screened for an emergency today and we have not found any etiology of symptoms that requires emergent treatment or admission to the hospital at this point. They understand that they have not had definitive care day and require follow-up for treatment of their condition. They also state understanding that they may have an emergent condition that may potentially have not of detected at this visit and they must return to the emergency department if they develop any worsening of symptoms or new complaints.      I have evaluated this patient, my supervising physician was available for consultation.              Critical Care: Not warranted at this time        This chart was completed using voice recognition transcription software. Please excuse any errors of transcription including grammatical, punctuation, syntax and spelling errors.  Please contact me with any questions regarding this chart.    Procedure  Procedures     ZELDA Penny-CNP  08/17/24 [id number]

## 2024-08-17 NOTE — Clinical Note
Allyssa Robin was seen and treated in our emergency department on 8/17/2024.  She may return to work on 08/20/2024.       If you have any questions or concerns, please don't hesitate to call.      Loyd Ashton, APRN-CNP

## 2024-08-19 ENCOUNTER — HOSPITAL ENCOUNTER (EMERGENCY)
Facility: HOSPITAL | Age: 24
Discharge: HOME | End: 2024-08-19
Payer: COMMERCIAL

## 2024-08-19 ENCOUNTER — APPOINTMENT (OUTPATIENT)
Dept: RADIOLOGY | Facility: HOSPITAL | Age: 24
End: 2024-08-19
Payer: COMMERCIAL

## 2024-08-19 ENCOUNTER — APPOINTMENT (OUTPATIENT)
Dept: BEHAVIORAL HEALTH | Facility: CLINIC | Age: 24
End: 2024-08-19
Payer: COMMERCIAL

## 2024-08-19 VITALS
TEMPERATURE: 97.9 F | HEART RATE: 99 BPM | SYSTOLIC BLOOD PRESSURE: 168 MMHG | OXYGEN SATURATION: 99 % | DIASTOLIC BLOOD PRESSURE: 77 MMHG | HEIGHT: 69 IN | WEIGHT: 194 LBS | RESPIRATION RATE: 20 BRPM | BODY MASS INDEX: 28.73 KG/M2

## 2024-08-19 DIAGNOSIS — S89.91XA INJURY OF RIGHT KNEE, INITIAL ENCOUNTER: Primary | ICD-10-CM

## 2024-08-19 DIAGNOSIS — F41.8 OTHER SPECIFIED ANXIETY DISORDERS: ICD-10-CM

## 2024-08-19 DIAGNOSIS — F41.1 GENERALIZED ANXIETY DISORDER: ICD-10-CM

## 2024-08-19 DIAGNOSIS — F95.9 TIC DISORDER: ICD-10-CM

## 2024-08-19 PROCEDURE — 99283 EMERGENCY DEPT VISIT LOW MDM: CPT

## 2024-08-19 PROCEDURE — 2500000001 HC RX 250 WO HCPCS SELF ADMINISTERED DRUGS (ALT 637 FOR MEDICARE OP): Performed by: NURSE PRACTITIONER

## 2024-08-19 PROCEDURE — 73564 X-RAY EXAM KNEE 4 OR MORE: CPT | Mod: RIGHT SIDE | Performed by: STUDENT IN AN ORGANIZED HEALTH CARE EDUCATION/TRAINING PROGRAM

## 2024-08-19 PROCEDURE — 99214 OFFICE O/P EST MOD 30 MIN: CPT | Performed by: PSYCHIATRY & NEUROLOGY

## 2024-08-19 PROCEDURE — 73564 X-RAY EXAM KNEE 4 OR MORE: CPT | Mod: RT

## 2024-08-19 RX ORDER — OXYCODONE AND ACETAMINOPHEN 5; 325 MG/1; MG/1
1 TABLET ORAL EVERY 6 HOURS PRN
Qty: 5 TABLET | Refills: 0 | Status: SHIPPED | OUTPATIENT
Start: 2024-08-19 | End: 2024-08-22

## 2024-08-19 RX ORDER — MIRTAZAPINE 7.5 MG/1
7.5 TABLET, FILM COATED ORAL NIGHTLY
Qty: 90 TABLET | Refills: 2 | Status: SHIPPED | OUTPATIENT
Start: 2024-08-19 | End: 2025-05-16

## 2024-08-19 RX ORDER — FLUOXETINE HYDROCHLORIDE 40 MG/1
40 CAPSULE ORAL DAILY
Qty: 90 CAPSULE | Refills: 2 | Status: SHIPPED | OUTPATIENT
Start: 2024-08-19 | End: 2025-05-16

## 2024-08-19 RX ORDER — CLONIDINE HYDROCHLORIDE 0.1 MG/1
0.2 TABLET, EXTENDED RELEASE ORAL NIGHTLY
Qty: 180 TABLET | Refills: 2 | Status: SHIPPED | OUTPATIENT
Start: 2024-08-19 | End: 2025-05-16

## 2024-08-19 RX ORDER — OXYCODONE AND ACETAMINOPHEN 5; 325 MG/1; MG/1
1 TABLET ORAL ONCE
Status: COMPLETED | OUTPATIENT
Start: 2024-08-19 | End: 2024-08-19

## 2024-08-19 ASSESSMENT — PAIN DESCRIPTION - LOCATION
LOCATION: KNEE
LOCATION: KNEE

## 2024-08-19 ASSESSMENT — LIFESTYLE VARIABLES
HAVE YOU EVER FELT YOU SHOULD CUT DOWN ON YOUR DRINKING: NO
TOTAL SCORE: 0
EVER HAD A DRINK FIRST THING IN THE MORNING TO STEADY YOUR NERVES TO GET RID OF A HANGOVER: NO
HAVE PEOPLE ANNOYED YOU BY CRITICIZING YOUR DRINKING: NO
EVER FELT BAD OR GUILTY ABOUT YOUR DRINKING: NO

## 2024-08-19 ASSESSMENT — PAIN - FUNCTIONAL ASSESSMENT: PAIN_FUNCTIONAL_ASSESSMENT: 0-10

## 2024-08-19 ASSESSMENT — PAIN DESCRIPTION - DESCRIPTORS
DESCRIPTORS: ACHING
DESCRIPTORS: ACHING

## 2024-08-19 ASSESSMENT — PAIN SCALES - GENERAL
PAINLEVEL_OUTOF10: 8
PAINLEVEL_OUTOF10: 8

## 2024-08-19 ASSESSMENT — PAIN DESCRIPTION - ORIENTATION
ORIENTATION: RIGHT
ORIENTATION: RIGHT

## 2024-08-19 ASSESSMENT — PAIN DESCRIPTION - PAIN TYPE: TYPE: ACUTE PAIN

## 2024-08-19 NOTE — ED PROVIDER NOTES
HPI   Chief Complaint   Patient presents with    Knee Pain       24-year-old female presents today with acute right knee pain.  She just had it x-rayed on the 17th but fell yesterday in the bathroom.  She cannot bear weight without pain.  She injured her knee when she slipped and fell down steps.  There was no acute findings on her prior x-ray.  A check of the overdose tracking system at the [facility] shows an overdose score 370 and narcotic score 115 and the sedative score of 80.  She has had 5 prescriptions that are trackable on April 5 Vicodin March 2 5 conditions February 29 Vicodin November 30 Vicodin and December 14 Vicodin.  She denies any change in skin temperature or color.  She has had adverse effects to methotrexate.  She was admitted to the hospital for abdominal pain due to the reaction to methotrexate.  She has had prior history of elbow surgery and 3 Achilles repairs which is why she required the narcotic medication.  She presently denies pregnancy.  She did a pregnancy test at home yesterday that was negative and she is on birth control.      History provided by:  Patient and parent   used: No            Patient History   Past Medical History:   Diagnosis Date    Acute pharyngitis, unspecified 07/21/2014    Sore throat    Acute pharyngitis, unspecified 04/18/2017    Sore throat    Acute tonsillitis, unspecified 03/22/2021    Acute tonsillitis, unspecified    Allergy, unspecified, initial encounter 10/08/2021    Hypersensitivity    Anxiety     Arthralgia of temporomandibular joint, unspecified side 04/07/2014    Temporomandibular joint pain    Body mass index (BMI) 19.9 or less, adult 09/17/2021    Body mass index (BMI) of 19.9 or less in adult    Body mass index (BMI) pediatric, 5th percentile to less than 85th percentile for age 09/17/2021    BMI (body mass index), pediatric, 5% to less than 85% for age    Calculus of kidney with calculus of ureter 02/12/2022    Calculus of  kidney with calculus of ureter    Candidiasis of skin and nail 08/31/2020    Candidal skin infection    Cellulitis of left toe 04/06/2018    Cellulitis of fifth toe of left foot    Contact with and (suspected) exposure to other viral communicable diseases 02/13/2020    Exposure to influenza    COVID-19 12/30/2020    COVID-19 virus infection    Displaced fracture of shaft of third metacarpal bone, left hand, initial encounter for closed fracture 12/05/2016    Closed displaced fracture of shaft of third metacarpal bone of left hand, initial encounter    Encounter for general adult medical examination without abnormal findings 09/17/2021    Examination, routine, over 18 years of age    Encounter for pre-employment examination 09/17/2021    Encounter for pre-employment health screening examination    Encounter for routine child health examination without abnormal findings 10/03/2018    Encounter for routine child health examination without abnormal findings    Hordeolum externum right upper eyelid 11/19/2021    Hordeolum externum of right upper eyelid    Jaw pain 10/07/2020    Chronic jaw pain    Joint derangement, unspecified 10/15/2021    Hypermobility of joint    Local infection of the skin and subcutaneous tissue, unspecified 04/26/2022    Infection of skin of finger    Localized swelling, mass and lump, head 04/28/2017    Tongue swelling    Nonscarring hair loss, unspecified 10/03/2018    Hair thinning    Other chronic diseases of tonsils and adenoids 04/28/2017    Cryptic tonsil    Other conditions influencing health status 11/18/2021    Encounter for procedure    Other diseases of tongue 04/28/2017    Tongue lesion    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    Other specified respiratory disorders 02/18/2019    Viral respiratory illness    Pain in left arm 11/22/2021    Pain of left upper extremity    Pain in right ankle and joints of right foot 09/23/2021    Right ankle pain     Pain in right wrist 06/08/2016    Right wrist pain    Pain in unspecified knee 09/30/2014    Joint pain, knee    Pain in unspecified shoulder 10/27/2014    Shoulder pain    Pain in unspecified wrist 11/22/2021    Chronic wrist pain    Panic attacks     Personal history of diseases of the skin and subcutaneous tissue 08/12/2017    History of telogen effluvium    Personal history of diseases of the skin and subcutaneous tissue 12/30/2020    History of pilonidal cyst    Personal history of diseases of the skin and subcutaneous tissue 04/06/2015    History of contact dermatitis    Personal history of other (healed) physical injury and trauma 11/19/2018    History of insect bite    Personal history of other diseases of the digestive system 02/13/2020    History of gastroenteritis    Personal history of other diseases of the female genital tract 08/12/2017    History of dysmenorrhea    Personal history of other diseases of the musculoskeletal system and connective tissue 12/14/2017    History of low back pain    Personal history of other diseases of the musculoskeletal system and connective tissue 05/10/2022    History of muscle pain    Personal history of other diseases of the respiratory system 11/19/2021    History of sore throat    Personal history of other diseases of the respiratory system 01/17/2017    History of acute sinusitis    Personal history of other diseases of the respiratory system 02/18/2019    History of sore throat    Personal history of other diseases of the respiratory system 07/21/2014    History of pharyngitis    Personal history of other diseases of the respiratory system 01/07/2015    History of influenza    Personal history of other diseases of the respiratory system 01/17/2017    History of acute sinusitis    Personal history of other diseases of urinary system 02/12/2022    History of hematuria    Personal history of other infectious and parasitic diseases 07/21/2014    History of viral  infection    Personal history of other specified conditions 02/12/2022    History of dysuria    Personal history of other specified conditions 02/12/2022    History of dysuria    Personal history of other specified conditions 09/20/2021    History of insomnia    Personal history of other specified conditions 10/15/2021    History of urinary urgency    Personal history of other specified conditions 10/15/2021    History of urinary frequency    Personal history of other specified conditions 04/05/2021    History of weight loss    Personal history of other specified conditions 02/18/2019    History of fever    Personal history of other specified conditions 03/15/2021    History of nasal congestion    Personal history of other specified conditions 02/21/2020    History of diarrhea    Personal history of other specified conditions 12/01/2016    History of abdominal pain    Personal history of other specified conditions 01/07/2015    History of vomiting    Personal history of other specified conditions 01/07/2015    History of fever    PONV (postoperative nausea and vomiting)     WEARS PATCH    Premenstrual dysphoric disorder 01/25/2016    PMDD (premenstrual dysphoric disorder)    Raised antibody titer 02/24/2020    Elevated EBV antibody titer    Right lower quadrant pain 02/21/2020    Bilateral lower abdominal pain    Sprain of interphalangeal joint of right middle finger, initial encounter 03/06/2018    Sprain of interphalangeal joint of right middle finger, initial encounter    Strain of unspecified muscle(s) and tendon(s) at lower leg level, left leg, initial encounter 12/10/2018    Strain of left knee    Superficial mycosis, unspecified 10/17/2015    Fungal rash of trunk    Tic disorder, unspecified 11/01/2021    Tic disorder    TMJ (dislocation of temporomandibular joint)     Unspecified abdominal pain 12/01/2016    Abdominal pain, acute    Unspecified acute conjunctivitis, left eye 12/01/2016    Acute  conjunctivitis, left eye    Unspecified conjunctivitis 11/13/2015    Conjunctivitis, left eye    Unspecified fracture of left wrist and hand, initial encounter for closed fracture 12/29/2016    Fracture of left hand, closed, initial encounter    Unspecified injury of left wrist, hand and finger(s), initial encounter 12/03/2016    Injury of left hand, initial encounter    Unspecified injury of right lower leg, initial encounter 12/07/2015    Right knee injury    Unspecified injury of right wrist, hand and finger(s), initial encounter 03/28/2017    Injury of right hand, initial encounter    Unspecified injury of right wrist, hand and finger(s), initial encounter 03/06/2018    Injury of finger of right hand, initial encounter    Unspecified injury of unspecified foot, initial encounter 05/02/2015    Foot injury    Unspecified injury of unspecified wrist, hand and finger(s), initial encounter     Finger injury    Unspecified renal colic 02/12/2022    Ureteral colic    Vomiting, unspecified 02/25/2020    Intermittent vomiting     Past Surgical History:   Procedure Laterality Date    OTHER SURGICAL HISTORY  12/11/2020    Foot surgery    OTHER SURGICAL HISTORY  10/19/2020    Wrist surgery    OTHER SURGICAL HISTORY  03/25/2022    Temporomandibular joint surgery    OTHER SURGICAL HISTORY  09/16/2020    Surgery    OTHER SURGICAL HISTORY  05/07/2021    Arthrocentesis (Therapeutic)     Family History   Problem Relation Name Age of Onset    Anxiety disorder Mother      Hodgkin's lymphoma Mother      Breast cancer Mother      Hypertension Father      Other (PATENT DUCTUS ARTERIOSUS) Father      Other (TIC DISORDER) Father      Lupus Father's Sister      Anxiety disorder Father's Brother      Anxiety disorder Maternal Grandmother      Anxiety disorder Maternal Grandfather      Lupus Paternal Grandmother      Hypertension Paternal Grandfather       Social History     Tobacco Use    Smoking status: Never     Passive exposure: Never     Smokeless tobacco: Never   Vaping Use    Vaping status: Never Used   Substance Use Topics    Alcohol use: Yes     Comment: social    Drug use: Never       Physical Exam   ED Triage Vitals [08/19/24 1811]   Temperature Heart Rate Respirations BP   36.6 °C (97.9 °F) 99 20 168/77      Pulse Ox Temp Source Heart Rate Source Patient Position   99 % Temporal Monitor --      BP Location FiO2 (%)     -- --       Physical Exam  Constitutional:       Appearance: Normal appearance.   HENT:      Head: Normocephalic and atraumatic.      Mouth/Throat:      Mouth: Mucous membranes are moist.   Eyes:      Extraocular Movements: Extraocular movements intact.      Pupils: Pupils are equal, round, and reactive to light.   Cardiovascular:      Rate and Rhythm: Normal rate and regular rhythm.      Pulses: Normal pulses.      Heart sounds: Normal heart sounds.   Pulmonary:      Effort: Pulmonary effort is normal.      Breath sounds: Normal breath sounds.   Abdominal:      General: Abdomen is flat.      Palpations: Abdomen is soft.   Musculoskeletal:         General: Tenderness present.      Cervical back: Normal range of motion and neck supple.      Comments: Drawer test is negative.  Laxity negative.  The pedal and posterior tibial pulse are 2/2 and skin is normal in temperature and color without any swelling   Skin:     General: Skin is warm.      Capillary Refill: Capillary refill takes less than 2 seconds.   Neurological:      General: No focal deficit present.      Mental Status: She is alert and oriented to person, place, and time.   Psychiatric:         Mood and Affect: Mood normal.         Behavior: Behavior normal.           ED Course & MDM   Diagnoses as of 08/19/24 1905   Injury of right knee, initial encounter                 No data recorded     [place] Coma Scale Score: 15 (08/19/24 1905 : Iker Robertson RN)                           Medical Decision Making  Drawer test was negative.  There is no laxity appreciated.   Pedal and posterior tibial pulse were 2/2.  Imaging was negative for acute fracture or malalignment there was no significant osseous abnormality of the right knee.  She was placed in knee immobilizer crutches and we confirmed appointment for the 22nd.  She is also aware of our walk-in clinic.  I sent Percocet to her pharmacy after checking the [place] prescriptive reporting authority.  Return precautions reviewed and safely discharged home.    Amount and/or Complexity of Data Reviewed  Radiology: ordered and independent interpretation performed.        Procedure  Procedures     Thompson Thakkar, ZELDA-CNP  08/19/24 0505

## 2024-08-19 NOTE — Clinical Note
Allyssa Robin was seen and treated in our emergency department on 8/19/2024.  She may return to work on 08/26/2024.  Needs to be cleared by ortho attending     If you have any questions or concerns, please don't hesitate to call.      Thompson Thakkar, APRN-CNP

## 2024-08-19 NOTE — Clinical Note
Allyssa Robin was seen and treated in our emergency department on 8/19/2024.  She may return to work on 08/26/2024.       If you have any questions or concerns, please don't hesitate to call.      Thompson Thakkar, APRN-CNP

## 2024-08-19 NOTE — PROGRESS NOTES
"Outpatient Child and Adolescent Psychiatry Follow-Up    Allyssa Kelly is a 24 y.o. female (assigned female at birth) presenting for psychiatric follow-up.   Patient evaluated in person     Chief Complaint:  tic disorder and Med Management    HPI:   Patient last evaluated 2.5.24, at which time was not sleeping well - started pt. on Remeron 7.5 mg, cont. Kapvay 2 mg and fluoxetine 40 mg.       Subjective   - lots of family stress - mom with \"elevated cancer levels\" - won't follow up because she just started new job and has no PTO yet  - pt. 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 Patient is calm and cooperative with appropriate eye contact; no psychomotor agitation or retardation and no EPS/TD noted. Speech with normal rate and rhythm, appropriate volume and tone; spontaneous and fluent. Patient states that mood is \"stressed\", affect congruent with stated mood and with appropriate range. Thought process is organized, linear, and goal directed with logical associations; patient does not endorse ideation of harm to self or others, does not endorse auditory or visual hallucinations, and does not appear to be responding to internal stimuli. No apparent deficits in memory, attention, concentration or language; fund of knowledge appears " "adequate for development and education. Insight and judgment are fair.     Vitals:   There were no vitals filed for this visit.      4/5/2024     8:50 AM 5/24/2024     8:57 AM 6/20/2024     3:45 PM 7/11/2024     1:25 PM 7/24/2024     4:01 PM 7/31/2024     2:43 PM 8/17/2024    11:55 AM   Vitals   Systolic 116  128 131 117 114 116   Diastolic 87  82 85 82 85 75   Heart Rate 85 75 83 82 85 90 93   Temp 36.3 °C (97.3 °F)   36.5 °C (97.7 °F)   36.7 °C (98.1 °F)   Resp 18      18   Height (in)  1.753 m (5' 9\") 1.753 m (5' 9\")  1.753 m (5' 9\") 1.753 m (5' 9\") 1.753 m (5' 9\")   Weight (lb)  183 193 197.8 197 194.4 194   BMI  27.02 kg/m2 28.5 kg/m2 29.21 kg/m2 29.09 kg/m2 28.71 kg/m2 28.65 kg/m2   BSA (m2)  2.01 m2 2.06 m2 2.09 m2 2.09 m2 2.07 m2 2.07 m2   Visit Report  Report Report Report Report Report       Current Medications:    Current Outpatient Medications:     ascorbic acid, vitamin C, 500 mg capsule, Take 1 tablet by mouth once daily. TAKE PER DIRECTED, Disp: , Rfl:     B complex-vitamin C-folic acid (Nephro-Harley Rx) 1- mg-mg-mcg tablet, Take 1 tablet by mouth. TAKE PER DIRECTED, Disp: , Rfl:     cholecalciferol (Vitamin D-3) 25 MCG (1000 UT) capsule, Take 1 capsule (25 mcg) by mouth once daily. TAKE PER DIRECTED, Disp: , Rfl:     cloNIDine ER (Kapvay) 0.1 mg tablet extended release 12 hr, Take 2 tablets (0.2 mg) by mouth once daily at bedtime., Disp: 180 tablet, Rfl: 2    drospirenone, contraceptive, (Slynd) 4 mg (28) tablet, Take 1 tablet by mouth once daily., Disp: 90 tablet, Rfl: 0    FLUoxetine (PROzac) 40 mg capsule, TAKE 1 CAPSULE BY MOUTH ONCE DAILY, Disp: 90 capsule, Rfl: 2    mirtazapine (Remeron) 7.5 mg tablet, Take 1 tablet (7.5 mg) by mouth once daily at bedtime., Disp: 90 tablet, Rfl: 2    MULTIVITAMIN-FERROUS FUMARATE-FOLIC ACID 9 MG-200 MCG TABLET, HALF TABLET, (Centrum), Take 1 half tablet by mouth once daily., Disp: , Rfl:     mycophenolate (CellCept) 250 mg capsule, Take 1 capsule (250 mg) " by mouth 2 times a day., Disp: 60 capsule, Rfl: 3    ondansetron (Zofran) 4 mg tablet, Take 1 tablet (4 mg) by mouth every 6 hours if needed for nausea or vomiting., Disp: 15 tablet, Rfl: 0    ondansetron ODT (Zofran-ODT) 4 mg disintegrating tablet, Take 1 tablet (4 mg) by mouth every 8 hours if needed for nausea or vomiting., Disp: 20 tablet, Rfl: 0    rizatriptan (Maxalt) 10 mg tablet, Take 1 tablet (10 mg) by mouth once daily as needed for migraine., Disp: 9 tablet, Rfl: 11     Assessment:   Allyssa Kelly is a 24 y.o. female (assigned female at birth) presenting for follow-up.     Diagnosis:   1. Generalized anxiety disorder  FLUoxetine (PROzac) 40 mg capsule      2. Tic disorder  cloNIDine ER (Kapvay) 0.1 mg tablet extended release 12 hr      3. Other specified anxiety disorders  mirtazapine (Remeron) 7.5 mg tablet        Assessment/Plan   As of 08/19/2024:   cont. current meds - mirtazapine 7.5 mg, fluoxetine 40 mg, clonidine ER 0.2 mg QHS  pt. reports new insurance so needs [facility] doc - put out question for who is taking new patients      Follow-up:     will follow up with new provider - will give pt. name  Most recent in-person visit 8.19.24      Bceky Rubi MD

## 2024-08-19 NOTE — ED TRIAGE NOTES
Patient had a fall 2 days ago injuring her right knee, she was seen in an ED, had negative Xrays and discharged home. Patient states she has not been able to bear weight on her right leg and the pain is getting worse. PCP advised her to come to the ED.

## 2024-08-20 ENCOUNTER — OFFICE VISIT (OUTPATIENT)
Dept: ORTHOPEDIC SURGERY | Facility: CLINIC | Age: 24
End: 2024-08-20
Payer: COMMERCIAL

## 2024-08-20 ENCOUNTER — HOSPITAL ENCOUNTER (OUTPATIENT)
Dept: RADIOLOGY | Facility: HOSPITAL | Age: 24
Discharge: HOME | End: 2024-08-20
Payer: COMMERCIAL

## 2024-08-20 VITALS — HEIGHT: 69 IN | BODY MASS INDEX: 28.73 KG/M2 | WEIGHT: 194 LBS

## 2024-08-20 DIAGNOSIS — S89.91XA INJURY OF RIGHT KNEE, INITIAL ENCOUNTER: ICD-10-CM

## 2024-08-20 DIAGNOSIS — S89.91XA INJURY OF RIGHT KNEE, INITIAL ENCOUNTER: Primary | ICD-10-CM

## 2024-08-20 PROCEDURE — 3008F BODY MASS INDEX DOCD: CPT

## 2024-08-20 PROCEDURE — 73721 MRI JNT OF LWR EXTRE W/O DYE: CPT | Mod: RIGHT SIDE | Performed by: RADIOLOGY

## 2024-08-20 PROCEDURE — 73721 MRI JNT OF LWR EXTRE W/O DYE: CPT | Mod: RT

## 2024-08-20 PROCEDURE — 1036F TOBACCO NON-USER: CPT

## 2024-08-20 PROCEDURE — 99214 OFFICE O/P EST MOD 30 MIN: CPT

## 2024-08-20 ASSESSMENT — PAIN DESCRIPTION - DESCRIPTORS: DESCRIPTORS: ACHING;SHARP

## 2024-08-20 ASSESSMENT — PAIN SCALES - GENERAL: PAINLEVEL_OUTOF10: 5 - MODERATE PAIN

## 2024-08-20 NOTE — PROGRESS NOTES
SHAGGY  Allyssa Kelly is a 24 y.o. female  in office today for   Chief Complaint   Patient presents with    Right Knee - Edema, Pain     8/17/24 dogs tripped her down the stairs, she is having pain behind the knee cap and going up into her thigh.  She states she can not put weight on it and fell a second time when she did try to bear weight.   .  she states she originally went to the ED 2 days ago because she also hit her head and had a concussion.  They advised to follow up with her PCP, but PCP advised to go back to the ED for head MRI.  While she was in the ED a second time, placed into knee immobilizer, given crutches.  She has tried RICE, taking Advil/Tylenol.  She was given Percocet by ED but doesn't like taking.    Past Medical History: history CRPS, SLE    Medication  Current Outpatient Medications on File Prior to Visit   Medication Sig Dispense Refill    ascorbic acid, vitamin C, 500 mg capsule Take 1 tablet by mouth once daily. TAKE PER DIRECTED      B complex-vitamin C-folic acid (Nephro-[facility]ley Rx) 1- mg-mg-mcg tablet Take 1 tablet by mouth. TAKE PER DIRECTED      cholecalciferol (Vitamin D-3) 25 MCG (1000 UT) capsule Take 1 capsule (25 mcg) by mouth once daily. TAKE PER DIRECTED      cloNIDine ER (Kapvay) 0.1 mg tablet extended release 12 hr Take 2 tablets (0.2 mg) by mouth once daily at bedtime. 180 tablet 2    drospirenone, contraceptive, (Slynd) 4 mg (28) tablet Take 1 tablet by mouth once daily. 90 tablet 0    FLUoxetine (PROzac) 40 mg capsule TAKE 1 CAPSULE BY MOUTH ONCE DAILY 90 capsule 2    mirtazapine (Remeron) 7.5 mg tablet Take 1 tablet (7.5 mg) by mouth once daily at bedtime. 90 tablet 2    MULTIVITAMIN-FERROUS FUMARATE-FOLIC ACID 9 MG-200 MCG TABLET, HALF TABLET, (Centrum) Take 1 half tablet by mouth once daily.      mycophenolate (CellCept) 250 mg capsule Take 1 capsule (250 mg) by mouth 2 times a day. 60 capsule 3    ondansetron (Zofran) 4 mg tablet Take 1 tablet (4 mg) by mouth  every 6 hours if needed for nausea or vomiting. 15 tablet 0    ondansetron ODT (Zofran-ODT) 4 mg disintegrating tablet Take 1 tablet (4 mg) by mouth every 8 hours if needed for nausea or vomiting. 20 tablet 0    oxyCODONE-acetaminophen (Percocet) 5-325 mg tablet Take 1 tablet by mouth every 6 hours if needed for severe pain (7 - 10) for up to 3 days. 5 tablet 0    rizatriptan (Maxalt) 10 mg tablet Take 1 tablet (10 mg) by mouth once daily as needed for migraine. 9 tablet 11    [DISCONTINUED] cloNIDine ER (Kapvay) 0.1 mg tablet extended release 12 hr Take 2 tablets (0.2 mg) by mouth once daily at bedtime. 180 tablet 2    [DISCONTINUED] cloNIDine ER (Kapvay) 0.1 mg tablet extended release 12 hr TAKE 2 TABLETS (0.2 MG) BY MOUTH ONCE DAILY AT BEDTIME. 180 tablet 1    [DISCONTINUED] FLUoxetine (PROzac) 40 mg capsule TAKE 1 CAPSULE BY MOUTH ONCE DAILY 90 capsule 2    [DISCONTINUED] mirtazapine (Remeron) 7.5 mg tablet TAKE 1 TABLET (7.5 MG) BY MOUTH ONCE DAILY AT BEDTIME. 90 tablet 2     Current Facility-Administered Medications on File Prior to Visit   Medication Dose Route Frequency Provider Last Rate Last Admin    [COMPLETED] oxyCODONE-acetaminophen (Percocet) 5-325 mg per tablet 1 tablet  1 tablet oral Once ZELDA Peres-CNP   1 tablet at 08/19/24 5958       Physical Exam  Constitutional: well developed, well nourished female in no acute distress  Psychiatric: normal mood, appropriate affect  Eyes: sclera anicteric  HENT: normocephalic/atraumatic  CV: regular rate and rhythm   Respiratory: non labored breathing  Integumentary: no rash  Neurological: moves all extremities    Right Knee Exam     Tenderness   The patient is experiencing tenderness in the patella, medial joint line and lateral joint line.    Range of Motion   Extension:  0   Flexion:  60     Tests   Varus: negative Valgus: negative  Drawer:  Anterior - trace        Other   Erythema: absent  Scars: absent  Sensation: normal  Swelling:  mild              Imaging/Lab:  X-rays were taken yesterday which were reviewed by myself and read by radiology and show No acute fracture, malalignment, or effusion.  No significant degenerative changes.      Assessment  Assessment: right knee injury    Plan  Plan:  History, physical exam, and imaging were reviewed with patient. Given inability to bear weight, mechanism of injury, physical exam, ordering MRI to assess for internal derangement injury specifically ACL and meniscus.  MRI orders given to patient.  In the meantime, should remain NWB using crutches.  RICE and antiinflammatories for pain and edema  Follow Up: Will follow up after MRI.  Will refer to Dr Bagley as needed.    All questions were answered for the patient prior to end of exam and patient addressed their understanding.    Radha Noland PA-C  08/20/24

## 2024-08-21 ENCOUNTER — LAB (OUTPATIENT)
Dept: LAB | Facility: LAB | Age: 24
End: 2024-08-21
Payer: COMMERCIAL

## 2024-08-21 ENCOUNTER — TELEPHONE (OUTPATIENT)
Dept: ORTHOPEDIC SURGERY | Facility: CLINIC | Age: 24
End: 2024-08-21
Payer: COMMERCIAL

## 2024-08-21 DIAGNOSIS — N91.5 OLIGOMENORRHEA, UNSPECIFIED TYPE: ICD-10-CM

## 2024-08-21 DIAGNOSIS — N91.0 AMENORRHEA, PRIMARY: Primary | ICD-10-CM

## 2024-08-21 DIAGNOSIS — N92.6 LATE PERIOD: ICD-10-CM

## 2024-08-21 PROCEDURE — 84702 CHORIONIC GONADOTROPIN TEST: CPT

## 2024-08-21 PROCEDURE — 36415 COLL VENOUS BLD VENIPUNCTURE: CPT

## 2024-08-21 NOTE — TELEPHONE ENCOUNTER
Spoke with patient regarding her RT knee MRI, per Radha Noland PA-C  she does not see any tears and looks like she a bruise medial side of her femur, she is to use the brace and crutches until she has no pain and to follow up in a couple weeks if she doesn't feel better

## 2024-08-22 ENCOUNTER — APPOINTMENT (OUTPATIENT)
Dept: ORTHOPEDIC SURGERY | Facility: CLINIC | Age: 24
End: 2024-08-22
Payer: COMMERCIAL

## 2024-08-22 ENCOUNTER — PATIENT OUTREACH (OUTPATIENT)
Dept: CARE COORDINATION | Facility: CLINIC | Age: 24
End: 2024-08-22

## 2024-08-22 LAB — B-HCG SERPL-ACNC: <3 MIU/ML

## 2024-08-23 NOTE — PROGRESS NOTES
Outreach call to patient to support a smooth transition of care from recent ED visit.    Discharge facility: [facility] ED  Discharge diagnosis:  knee pain    PCP Appointment Date: 8/22/24    Spoke with patient, reviewed discharge medications, discharge instructions, assessed social needs, and provided education on importance of follow-up appointment with provider.     See  Hospital Encounter and Summary, and Discharge assessment below for further details. Information obtained from discharge summary from EMR.    Engagement  Call Start Time: 1456 (8/22/2024  2:57 PM)    Medications  Medications reviewed with patient/caregiver?: Yes (8/22/2024  2:57 PM)  Is the patient having any side effects they believe may be caused by any medication additions or changes?: No (8/22/2024  2:57 PM)  Does the patient have all medications ordered at discharge?: Yes (8/22/2024  2:57 PM)  Care Management Interventions: No intervention needed (8/22/2024  2:57 PM)  Is the patient taking all medications as directed (includes completed medication regime)?: Yes (8/22/2024  2:57 PM)    Appointments  Does the patient have a primary care provider?: Yes (8/22/2024  2:57 PM)  Care Management Interventions: Verified appointment date/time/provider (8/22/2024  2:57 PM)  Has the patient kept scheduled appointments due by today?: Yes (8/22/2024  2:57 PM)    Self Management  What Durable Medical Equipment (DME) was ordered?: knee immobilizer and crutches (8/22/2024  2:57 PM)  Has all Durable Medical Equipment (DME) been delivered?: Yes (8/22/2024  2:57 PM)    Patient Teaching  Does the patient have access to their discharge instructions?: Yes (8/22/2024  2:57 PM)  Care Management Interventions: Reviewed instructions with patient (8/22/2024  2:57 PM)  What is the patient's perception of their health status since discharge?: Improving (8/22/2024  2:57 PM)  Is the patient/caregiver able to teach back the hierarchy of who to call/visit for symptoms/problems?  PCP, Specialist, Home Health nurse, Urgent Care, ED, 911: Yes (8/22/2024  2:57 PM)    Wrap Up  Wrap Up Additional Comments: Patient reports she is doing better at time of outreach. She saw ortho, is wearing knee immobilizer and using crutches. Pain is manageable, weaning off of Percocet. (8/22/2024  2:57 PM)    Will continue to monitor through transition period. Provided patient with my contact information for potential needs.    Sera Nguyen RN BSN  [employer] Care Manager  [phone]

## 2024-08-28 ENCOUNTER — TELEPHONE (OUTPATIENT)
Dept: ORTHOPEDIC SURGERY | Facility: CLINIC | Age: 24
End: 2024-08-28
Payer: COMMERCIAL

## 2024-08-28 NOTE — TELEPHONE ENCOUNTER
Allyssa had recently requested leave paperwork be filled out by our office, for 2 weeks off of work. According to Radha Argueta notes this was not discussed and the patient needs to be seen again to discuss this. She is scheduled for tomorrow 8/29/24.

## 2024-08-29 ENCOUNTER — OFFICE VISIT (OUTPATIENT)
Dept: ORTHOPEDIC SURGERY | Facility: CLINIC | Age: 24
End: 2024-08-29
Payer: COMMERCIAL

## 2024-08-29 VITALS — WEIGHT: 194 LBS | BODY MASS INDEX: 28.73 KG/M2 | HEIGHT: 69 IN

## 2024-08-29 DIAGNOSIS — T14.8XXA CONTUSION OF BONE: ICD-10-CM

## 2024-08-29 DIAGNOSIS — S89.91XA INJURY OF RIGHT KNEE, INITIAL ENCOUNTER: Primary | ICD-10-CM

## 2024-08-29 PROCEDURE — 99212 OFFICE O/P EST SF 10 MIN: CPT

## 2024-08-29 PROCEDURE — 1036F TOBACCO NON-USER: CPT

## 2024-08-29 PROCEDURE — 3008F BODY MASS INDEX DOCD: CPT

## 2024-08-29 ASSESSMENT — PAIN SCALES - GENERAL: PAINLEVEL_OUTOF10: 2

## 2024-08-29 ASSESSMENT — PAIN - FUNCTIONAL ASSESSMENT: PAIN_FUNCTIONAL_ASSESSMENT: 0-10

## 2024-08-29 NOTE — LETTER
August 29, 2024     Patient: Allyssa Kelly   YOB: 2000   Date of Visit: 8/29/2024       To Whom It May Concern:    It is my medical opinion that Allyssa Kelly  has been under my care since 8/16/24 for right knee injury and concussion, she can return to work on 9/3/24 without restricitons .    If you have any questions or concerns, please don't hesitate to call.         Sincerely,        Radha Noland PA-C    CC: No Recipients

## 2024-08-29 NOTE — PROGRESS NOTES
SHAGGY Mcgovernkathy Kelly is a 24 y.o. female in office today for follow up of side: right knee injury.  she states that the knee if felling a lot better at this point. She has started to come out of the knee immobilizer.  Wants to discuss RTW.      Physical Exam  Constitutional: well developed, well nourished female in no acute distress  Psychiatric: normal mood, appropriate affect  Eyes: sclera anicteric  HENT: normocephalic/atraumatic  CV: regular rate and rhythm   Respiratory: non labored breathing  Integumentary: no rash  Neurological: moves all extremities    Right Knee Exam     Tenderness   The patient is experiencing tenderness in the medial joint line.    Range of Motion   Extension:  0   Flexion:  100     Other   Erythema: absent  Scars: absent  Sensation: normal  Swelling: mild            Imaging/Lab:  MRI were taken 8/20/24 which were reviewed by myself and read by radiology and show Osseous small osseous contusion medial femoral condyle.     No evidence of other internal derangement right knee.    Assessment  Assessment: right knee bone contusion    Plan  Plan:  History, physical exam, and imaging were reviewed with patient. Discussed starting to come out of the brace as tolerated.  Discussed she may have lingering pain for several months as the bone continues to heal.  RICE and antiinflammatories as needed.  Letter written to return to work next week.  Follow Up: Patient to follow up as needed if pain persists or gets worse.      All questions were answered for the patient prior to end of exam and patient addressed their understanding.    Radha Noland PA-C  08/29/24

## 2024-09-10 ENCOUNTER — DOCUMENTATION (OUTPATIENT)
Dept: CARE COORDINATION | Facility: CLINIC | Age: 24
End: 2024-09-10
Payer: COMMERCIAL

## 2024-09-10 NOTE — PROGRESS NOTES
Reviewed chart prior to patient outreach. Patient is following with ortho and improving. No further outreach indicated at this time.    Sera Nguyen RN BSN  ACO Care Manager  [phone]

## 2024-10-24 ENCOUNTER — PATIENT OUTREACH (OUTPATIENT)
Dept: CARE COORDINATION | Facility: CLINIC | Age: 24
End: 2024-10-24
Payer: COMMERCIAL

## 2024-10-24 NOTE — PROGRESS NOTES
Second contact attempt to schedule nutrition assessment. Will make a final contact attempt before closing referral.

## 2024-11-11 ENCOUNTER — PATIENT OUTREACH (OUTPATIENT)
Dept: CARE COORDINATION | Facility: CLINIC | Age: 24
End: 2024-11-11
Payer: COMMERCIAL

## 2024-11-11 NOTE — PROGRESS NOTES
Final contact attempt; was unable to reach pt through messaging portal or phone. Will close referral.

## 2024-11-29 ENCOUNTER — HOSPITAL ENCOUNTER (EMERGENCY)
Facility: HOSPITAL | Age: 24
Discharge: OTHER INSTITUTION | End: 2024-11-30
Attending: EMERGENCY MEDICINE
Payer: COMMERCIAL

## 2024-11-29 DIAGNOSIS — R10.30 LOWER ABDOMINAL PAIN: Primary | ICD-10-CM

## 2024-11-29 LAB
ALBUMIN SERPL BCP-MCNC: 4.8 G/DL (ref 3.4–5)
ALP SERPL-CCNC: 94 U/L (ref 33–110)
ALT SERPL W P-5'-P-CCNC: 12 U/L (ref 7–45)
ANION GAP SERPL CALC-SCNC: 16 MMOL/L (ref 10–20)
APPEARANCE UR: CLEAR
AST SERPL W P-5'-P-CCNC: 17 U/L (ref 9–39)
BILIRUB SERPL-MCNC: 0.3 MG/DL (ref 0–1.2)
BILIRUB UR STRIP.AUTO-MCNC: NEGATIVE MG/DL
BUN SERPL-MCNC: 5 MG/DL (ref 6–23)
CALCIUM SERPL-MCNC: 9.5 MG/DL (ref 8.6–10.3)
CHLORIDE SERPL-SCNC: 105 MMOL/L (ref 98–107)
CO2 SERPL-SCNC: 24 MMOL/L (ref 21–32)
COLOR UR: COLORLESS
CREAT SERPL-MCNC: 0.79 MG/DL (ref 0.5–1.05)
CRP SERPL-MCNC: 0.49 MG/DL
EGFRCR SERPLBLD CKD-EPI 2021: >90 ML/MIN/1.73M*2
ERYTHROCYTE [DISTWIDTH] IN BLOOD BY AUTOMATED COUNT: 13.7 % (ref 11.5–14.5)
GLUCOSE SERPL-MCNC: 99 MG/DL (ref 74–99)
GLUCOSE UR STRIP.AUTO-MCNC: NORMAL MG/DL
HCT VFR BLD AUTO: 43.8 % (ref 36–46)
HGB BLD-MCNC: 14.5 G/DL (ref 12–16)
KETONES UR STRIP.AUTO-MCNC: NEGATIVE MG/DL
LEUKOCYTE ESTERASE UR QL STRIP.AUTO: NEGATIVE
MCH RBC QN AUTO: 27.7 PG (ref 26–34)
MCHC RBC AUTO-ENTMCNC: 33.1 G/DL (ref 32–36)
MCV RBC AUTO: 84 FL (ref 80–100)
NITRITE UR QL STRIP.AUTO: NEGATIVE
NRBC BLD-RTO: 0 /100 WBCS (ref 0–0)
PH UR STRIP.AUTO: 6.5 [PH]
PLATELET # BLD AUTO: 271 X10*3/UL (ref 150–450)
POTASSIUM SERPL-SCNC: 3.7 MMOL/L (ref 3.5–5.3)
PROT SERPL-MCNC: 8.1 G/DL (ref 6.4–8.2)
PROT UR STRIP.AUTO-MCNC: NEGATIVE MG/DL
RBC # BLD AUTO: 5.24 X10*6/UL (ref 4–5.2)
RBC # UR STRIP.AUTO: NEGATIVE /UL
SODIUM SERPL-SCNC: 141 MMOL/L (ref 136–145)
SP GR UR STRIP.AUTO: 1
UROBILINOGEN UR STRIP.AUTO-MCNC: NORMAL MG/DL
WBC # BLD AUTO: 7.6 X10*3/UL (ref 4.4–11.3)

## 2024-11-29 PROCEDURE — 99284 EMERGENCY DEPT VISIT MOD MDM: CPT | Mod: 25

## 2024-11-29 PROCEDURE — 2500000004 HC RX 250 GENERAL PHARMACY W/ HCPCS (ALT 636 FOR OP/ED): Performed by: EMERGENCY MEDICINE

## 2024-11-29 PROCEDURE — 85027 COMPLETE CBC AUTOMATED: CPT | Performed by: EMERGENCY MEDICINE

## 2024-11-29 PROCEDURE — 81003 URINALYSIS AUTO W/O SCOPE: CPT | Performed by: EMERGENCY MEDICINE

## 2024-11-29 PROCEDURE — 96374 THER/PROPH/DIAG INJ IV PUSH: CPT

## 2024-11-29 PROCEDURE — 82565 ASSAY OF CREATININE: CPT | Performed by: EMERGENCY MEDICINE

## 2024-11-29 PROCEDURE — 81025 URINE PREGNANCY TEST: CPT | Performed by: EMERGENCY MEDICINE

## 2024-11-29 PROCEDURE — 86140 C-REACTIVE PROTEIN: CPT | Performed by: EMERGENCY MEDICINE

## 2024-11-29 PROCEDURE — 36415 COLL VENOUS BLD VENIPUNCTURE: CPT | Performed by: EMERGENCY MEDICINE

## 2024-11-29 RX ORDER — KETOROLAC TROMETHAMINE 30 MG/ML
30 INJECTION, SOLUTION INTRAMUSCULAR; INTRAVENOUS ONCE
Status: COMPLETED | OUTPATIENT
Start: 2024-11-29 | End: 2024-11-29

## 2024-11-29 RX ADMIN — KETOROLAC TROMETHAMINE 30 MG: 30 INJECTION, SOLUTION INTRAMUSCULAR at 23:43

## 2024-11-29 ASSESSMENT — PAIN DESCRIPTION - PAIN TYPE: TYPE: ACUTE PAIN

## 2024-11-29 ASSESSMENT — PAIN SCALES - GENERAL: PAINLEVEL_OUTOF10: 8

## 2024-11-29 ASSESSMENT — PAIN - FUNCTIONAL ASSESSMENT: PAIN_FUNCTIONAL_ASSESSMENT: 0-10

## 2024-11-29 ASSESSMENT — PAIN DESCRIPTION - DESCRIPTORS: DESCRIPTORS: SHARP;CRAMPING

## 2024-11-29 ASSESSMENT — PAIN DESCRIPTION - ORIENTATION: ORIENTATION: RIGHT;LOWER

## 2024-11-29 ASSESSMENT — PAIN DESCRIPTION - LOCATION: LOCATION: ABDOMEN

## 2024-11-29 ASSESSMENT — PAIN DESCRIPTION - ONSET: ONSET: SUDDEN

## 2024-11-30 ENCOUNTER — APPOINTMENT (OUTPATIENT)
Dept: RADIOLOGY | Facility: HOSPITAL | Age: 24
End: 2024-11-30
Payer: COMMERCIAL

## 2024-11-30 VITALS
SYSTOLIC BLOOD PRESSURE: 133 MMHG | RESPIRATION RATE: 18 BRPM | TEMPERATURE: 97.8 F | DIASTOLIC BLOOD PRESSURE: 89 MMHG | HEART RATE: 90 BPM | WEIGHT: 175 LBS | OXYGEN SATURATION: 100 % | BODY MASS INDEX: 25.92 KG/M2 | HEIGHT: 69 IN

## 2024-11-30 LAB
ERYTHROCYTE [SEDIMENTATION RATE] IN BLOOD BY WESTERGREN METHOD: 20 MM/H (ref 0–20)
HCG UR QL IA.RAPID: NEGATIVE

## 2024-11-30 PROCEDURE — 2550000001 HC RX 255 CONTRASTS: Performed by: EMERGENCY MEDICINE

## 2024-11-30 PROCEDURE — 96375 TX/PRO/DX INJ NEW DRUG ADDON: CPT

## 2024-11-30 PROCEDURE — 36415 COLL VENOUS BLD VENIPUNCTURE: CPT | Performed by: EMERGENCY MEDICINE

## 2024-11-30 PROCEDURE — 96361 HYDRATE IV INFUSION ADD-ON: CPT

## 2024-11-30 PROCEDURE — 96376 TX/PRO/DX INJ SAME DRUG ADON: CPT

## 2024-11-30 PROCEDURE — 74177 CT ABD & PELVIS W/CONTRAST: CPT

## 2024-11-30 PROCEDURE — 2500000004 HC RX 250 GENERAL PHARMACY W/ HCPCS (ALT 636 FOR OP/ED)

## 2024-11-30 PROCEDURE — 2500000004 HC RX 250 GENERAL PHARMACY W/ HCPCS (ALT 636 FOR OP/ED): Performed by: EMERGENCY MEDICINE

## 2024-11-30 PROCEDURE — 85652 RBC SED RATE AUTOMATED: CPT | Performed by: EMERGENCY MEDICINE

## 2024-11-30 PROCEDURE — 74177 CT ABD & PELVIS W/CONTRAST: CPT | Mod: FOREIGN READ | Performed by: RADIOLOGY

## 2024-11-30 RX ORDER — FENTANYL CITRATE 50 UG/ML
50 INJECTION, SOLUTION INTRAMUSCULAR; INTRAVENOUS ONCE
Status: COMPLETED | OUTPATIENT
Start: 2024-11-30 | End: 2024-11-30

## 2024-11-30 RX ORDER — HYDROMORPHONE HYDROCHLORIDE 1 MG/ML
1 INJECTION, SOLUTION INTRAMUSCULAR; INTRAVENOUS; SUBCUTANEOUS ONCE
Status: COMPLETED | OUTPATIENT
Start: 2024-11-30 | End: 2024-11-30

## 2024-11-30 RX ORDER — ONDANSETRON HYDROCHLORIDE 2 MG/ML
4 INJECTION, SOLUTION INTRAVENOUS ONCE
Status: COMPLETED | OUTPATIENT
Start: 2024-11-30 | End: 2024-11-30

## 2024-11-30 RX ORDER — FENTANYL CITRATE 50 UG/ML
INJECTION, SOLUTION INTRAMUSCULAR; INTRAVENOUS
Status: COMPLETED
Start: 2024-11-30 | End: 2024-11-30

## 2024-11-30 RX ORDER — MORPHINE SULFATE 4 MG/ML
4 INJECTION, SOLUTION INTRAMUSCULAR; INTRAVENOUS ONCE
Status: COMPLETED | OUTPATIENT
Start: 2024-11-30 | End: 2024-11-30

## 2024-11-30 RX ADMIN — IOHEXOL 75 ML: 350 INJECTION, SOLUTION INTRAVENOUS at 01:06

## 2024-11-30 RX ADMIN — FENTANYL CITRATE 50 MCG: 50 INJECTION, SOLUTION INTRAMUSCULAR; INTRAVENOUS at 07:34

## 2024-11-30 RX ADMIN — HYDROMORPHONE HYDROCHLORIDE 1 MG: 1 INJECTION, SOLUTION INTRAMUSCULAR; INTRAVENOUS; SUBCUTANEOUS at 01:46

## 2024-11-30 RX ADMIN — METHYLPREDNISOLONE SODIUM SUCCINATE 125 MG: 125 INJECTION, POWDER, FOR SOLUTION INTRAMUSCULAR; INTRAVENOUS at 03:42

## 2024-11-30 RX ADMIN — FENTANYL CITRATE 50 MCG: 0.05 INJECTION, SOLUTION INTRAMUSCULAR; INTRAVENOUS at 09:39

## 2024-11-30 RX ADMIN — ONDANSETRON 4 MG: 2 INJECTION INTRAMUSCULAR; INTRAVENOUS at 09:40

## 2024-11-30 RX ADMIN — FENTANYL CITRATE 50 MCG: 0.05 INJECTION, SOLUTION INTRAMUSCULAR; INTRAVENOUS at 07:34

## 2024-11-30 RX ADMIN — SODIUM CHLORIDE 1000 ML: 9 INJECTION, SOLUTION INTRAVENOUS at 03:42

## 2024-11-30 RX ADMIN — MORPHINE SULFATE 4 MG: 4 INJECTION, SOLUTION INTRAMUSCULAR; INTRAVENOUS at 00:28

## 2024-11-30 RX ADMIN — FENTANYL CITRATE 50 MCG: 0.05 INJECTION, SOLUTION INTRAMUSCULAR; INTRAVENOUS at 04:20

## 2024-11-30 RX ADMIN — ONDANSETRON 4 MG: 2 INJECTION INTRAMUSCULAR; INTRAVENOUS at 00:28

## 2024-11-30 RX ADMIN — FENTANYL CITRATE 50 MCG: 50 INJECTION, SOLUTION INTRAMUSCULAR; INTRAVENOUS at 04:20

## 2024-11-30 ASSESSMENT — PAIN SCALES - GENERAL
PAINLEVEL_OUTOF10: 7
PAINLEVEL_OUTOF10: 6
PAINLEVEL_OUTOF10: 8
PAINLEVEL_OUTOF10: 8
PAINLEVEL_OUTOF10: 9
PAINLEVEL_OUTOF10: 0 - NO PAIN

## 2024-11-30 ASSESSMENT — PAIN DESCRIPTION - LOCATION
LOCATION: ABDOMEN

## 2024-11-30 ASSESSMENT — PAIN SCALES - WONG BAKER: WONGBAKER_NUMERICALRESPONSE: NO HURT

## 2024-11-30 ASSESSMENT — PAIN DESCRIPTION - DESCRIPTORS: DESCRIPTORS: CRAMPING;ACHING

## 2024-11-30 ASSESSMENT — PAIN DESCRIPTION - PAIN TYPE: TYPE: ACUTE PAIN

## 2024-11-30 ASSESSMENT — PAIN DESCRIPTION - ORIENTATION
ORIENTATION: RIGHT

## 2024-11-30 NOTE — ED TRIAGE NOTES
Pt came into ED by ambulance c/o right lower abd pain. Pt has hx of lupus and ovarian cyst ruptures and states that is what it feels like is happening. Pt states she did not take anything at home for the pain. Pt is nauseous but denies any other urinary symptoms.

## 2024-11-30 NOTE — ED NOTES
Pt has a bed at [facility], she is going to:    [facility]   3ST S05 bed 1  The number to call report [phone]       Rhonda Escamilla RN  11/30/24 [id number]
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
## 2024-11-30 NOTE — ED PROVIDER NOTES
HPI   Chief Complaint   Patient presents with    Abdominal Pain       RLQ pain that started suddenly about two hours before arrival. History of ovarian cysts, and tonight feels like one may have ruptured. No fever or chills. Nauseated, but with no vomiting.            Patient History   Past Medical History:   Diagnosis Date    Acute pharyngitis, unspecified 07/21/2014    Sore throat    Acute pharyngitis, unspecified 04/18/2017    Sore throat    Acute tonsillitis, unspecified 03/22/2021    Acute tonsillitis, unspecified    Allergy, unspecified, initial encounter 10/08/2021    Hypersensitivity    Anxiety     Arthralgia of temporomandibular joint, unspecified side 04/07/2014    Temporomandibular joint pain    Body mass index (BMI) 19.9 or less, adult 09/17/2021    Body mass index (BMI) of 19.9 or less in adult    Body mass index (BMI) pediatric, 5th percentile to less than 85th percentile for age 09/17/2021    BMI (body mass index), pediatric, 5% to less than 85% for age    Calculus of kidney with calculus of ureter 02/12/2022    Calculus of kidney with calculus of ureter    Candidiasis of skin and nail 08/31/2020    Candidal skin infection    Cellulitis of left toe 04/06/2018    Cellulitis of fifth toe of left foot    Contact with and (suspected) exposure to other viral communicable diseases 02/13/2020    Exposure to influenza    COVID-19 12/30/2020    COVID-19 virus infection    Displaced fracture of shaft of third metacarpal bone, left hand, initial encounter for closed fracture 12/05/2016    Closed displaced fracture of shaft of third metacarpal bone of left hand, initial encounter    Encounter for general adult medical examination without abnormal findings 09/17/2021    Examination, routine, over 18 years of age    Encounter for pre-employment examination 09/17/2021    Encounter for pre-employment health screening examination    Encounter for routine child health examination without abnormal findings 10/03/2018     Encounter for routine child health examination without abnormal findings    Hordeolum externum right upper eyelid 11/19/2021    Hordeolum externum of right upper eyelid    Jaw pain 10/07/2020    Chronic jaw pain    Joint derangement, unspecified 10/15/2021    Hypermobility of joint    Local infection of the skin and subcutaneous tissue, unspecified 04/26/2022    Infection of skin of finger    Localized swelling, mass and lump, head 04/28/2017    Tongue swelling    Nonscarring hair loss, unspecified 10/03/2018    Hair thinning    Other chronic diseases of tonsils and adenoids 04/28/2017    Cryptic tonsil    Other conditions influencing health status 11/18/2021    Encounter for procedure    Other diseases of tongue 04/28/2017    Tongue lesion    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    Other specified respiratory disorders 02/18/2019    Viral respiratory illness    Pain in left arm 11/22/2021    Pain of left upper extremity    Pain in right ankle and joints of right foot 09/23/2021    Right ankle pain    Pain in right wrist 06/08/2016    Right wrist pain    Pain in unspecified knee 09/30/2014    Joint pain, knee    Pain in unspecified shoulder 10/27/2014    Shoulder pain    Pain in unspecified wrist 11/22/2021    Chronic wrist pain    Panic attacks     Personal history of diseases of the skin and subcutaneous tissue 08/12/2017    History of telogen effluvium    Personal history of diseases of the skin and subcutaneous tissue 12/30/2020    History of pilonidal cyst    Personal history of diseases of the skin and subcutaneous tissue 04/06/2015    History of contact dermatitis    Personal history of other (healed) physical injury and trauma 11/19/2018    History of insect bite    Personal history of other diseases of the digestive system 02/13/2020    History of gastroenteritis    Personal history of other diseases of the female genital tract 08/12/2017    History of dysmenorrhea     Personal history of other diseases of the musculoskeletal system and connective tissue 12/14/2017    History of low back pain    Personal history of other diseases of the musculoskeletal system and connective tissue 05/10/2022    History of muscle pain    Personal history of other diseases of the respiratory system 11/19/2021    History of sore throat    Personal history of other diseases of the respiratory system 01/17/2017    History of acute sinusitis    Personal history of other diseases of the respiratory system 02/18/2019    History of sore throat    Personal history of other diseases of the respiratory system 07/21/2014    History of pharyngitis    Personal history of other diseases of the respiratory system 01/07/2015    History of influenza    Personal history of other diseases of the respiratory system 01/17/2017    History of acute sinusitis    Personal history of other diseases of urinary system 02/12/2022    History of hematuria    Personal history of other infectious and parasitic diseases 07/21/2014    History of viral infection    Personal history of other specified conditions 02/12/2022    History of dysuria    Personal history of other specified conditions 02/12/2022    History of dysuria    Personal history of other specified conditions 09/20/2021    History of insomnia    Personal history of other specified conditions 10/15/2021    History of urinary urgency    Personal history of other specified conditions 10/15/2021    History of urinary frequency    Personal history of other specified conditions 04/05/2021    History of weight loss    Personal history of other specified conditions 02/18/2019    History of fever    Personal history of other specified conditions 03/15/2021    History of nasal congestion    Personal history of other specified conditions 02/21/2020    History of diarrhea    Personal history of other specified conditions 12/01/2016    History of abdominal pain    Personal history  of other specified conditions 01/07/2015    History of vomiting    Personal history of other specified conditions 01/07/2015    History of fever    PONV (postoperative nausea and vomiting)     WEARS PATCH    Premenstrual dysphoric disorder 01/25/2016    PMDD (premenstrual dysphoric disorder)    Raised antibody titer 02/24/2020    Elevated EBV antibody titer    Right lower quadrant pain 02/21/2020    Bilateral lower abdominal pain    Sprain of interphalangeal joint of right middle finger, initial encounter 03/06/2018    Sprain of interphalangeal joint of right middle finger, initial encounter    Strain of unspecified muscle(s) and tendon(s) at lower leg level, left leg, initial encounter 12/10/2018    Strain of left knee    Superficial mycosis, unspecified 10/17/2015    Fungal rash of trunk    Tic disorder, unspecified 11/01/2021    Tic disorder    TMJ (dislocation of temporomandibular joint)     Unspecified abdominal pain 12/01/2016    Abdominal pain, acute    Unspecified acute conjunctivitis, left eye 12/01/2016    Acute conjunctivitis, left eye    Unspecified conjunctivitis 11/13/2015    Conjunctivitis, left eye    Unspecified fracture of left wrist and hand, initial encounter for closed fracture 12/29/2016    Fracture of left hand, closed, initial encounter    Unspecified injury of left wrist, hand and finger(s), initial encounter 12/03/2016    Injury of left hand, initial encounter    Unspecified injury of right lower leg, initial encounter 12/07/2015    Right knee injury    Unspecified injury of right wrist, hand and finger(s), initial encounter 03/28/2017    Injury of right hand, initial encounter    Unspecified injury of right wrist, hand and finger(s), initial encounter 03/06/2018    Injury of finger of right hand, initial encounter    Unspecified injury of unspecified foot, initial encounter 05/02/2015    Foot injury    Unspecified injury of unspecified wrist, hand and finger(s), initial encounter      Finger injury    Unspecified renal colic 02/12/2022    Ureteral colic    Vomiting, unspecified 02/25/2020    Intermittent vomiting     Past Surgical History:   Procedure Laterality Date    OTHER SURGICAL HISTORY  12/11/2020    Foot surgery    OTHER SURGICAL HISTORY  10/19/2020    Wrist surgery    OTHER SURGICAL HISTORY  03/25/2022    Temporomandibular joint surgery    OTHER SURGICAL HISTORY  09/16/2020    Surgery    OTHER SURGICAL HISTORY  05/07/2021    Arthrocentesis (Therapeutic)     Family History   Problem Relation Name Age of Onset    Anxiety disorder Mother      Hodgkin's lymphoma Mother      Breast cancer Mother      Hypertension Father      Other (PATENT DUCTUS ARTERIOSUS) Father      Other (TIC DISORDER) Father      Lupus Father's Sister      Anxiety disorder Father's Brother      Anxiety disorder Maternal Grandmother      Anxiety disorder Maternal Grandfather      Lupus Paternal Grandmother      Hypertension Paternal Grandfather       Social History     Tobacco Use    Smoking status: Never     Passive exposure: Never    Smokeless tobacco: Never   Vaping Use    Vaping status: Never Used   Substance Use Topics    Alcohol use: Yes     Comment: social    Drug use: Never       Physical Exam   ED Triage Vitals [11/29/24 2333]   Temperature Heart Rate Respirations BP   36.6 °C (97.8 °F) 98 17 133/72      Pulse Ox Temp Source Heart Rate Source Patient Position   99 % Temporal Monitor Lying      BP Location FiO2 (%)     Right arm --       Physical Exam  Vitals and nursing note reviewed.   HENT:      Head: Normocephalic and atraumatic.      Right Ear: Tympanic membrane and ear canal normal.      Left Ear: Tympanic membrane and ear canal normal.      Nose: Nose normal.      Mouth/Throat:      Mouth: Mucous membranes are moist.   Cardiovascular:      Rate and Rhythm: Normal rate.   Pulmonary:      Effort: Pulmonary effort is normal.      Breath sounds: Normal breath sounds.   Abdominal:      General: Abdomen is flat.  Bowel sounds are normal.      Palpations: Abdomen is soft.      Tenderness: There is abdominal tenderness in the right lower quadrant. There is no rebound.   Musculoskeletal:         General: Normal range of motion.      Cervical back: Normal range of motion.   Skin:     General: Skin is warm and dry.   Neurological:      General: No focal deficit present.      Mental Status: She is alert.           ED Course & MDM   Diagnoses as of 11/30/24 0456   Lower abdominal pain                 No data recorded                                 Medical Decision Making  CT tonight was done to rule out appendicitis or ovarian cyst and this was read as negative except for the presence of stool in the colon.  I was speaking with the patient at bedside about outpatient follow-up and starting her on pain medication and steroids but she told me that this is the same way that started a few months ago when she came in with similar problems in went home only have any come back the next day with what the rheumatologist thought was a lupus flare but manifested by GI pain.  She says they admitted her and gave her IV steroids and then she was discharged home so I have talked with the nurse practitioner on-call for medicine service at [facility] (where there are beds) and they have accepted her in transfer but pointed out as we have to the patient that will be quite a wait until there is a definite bed with her name on it and transportation.         Procedure  Procedures     Norberto Anthony MD  11/30/24 2517       Norberto Anthony MD  11/30/24 9527

## 2025-01-03 ENCOUNTER — DOCUMENTATION (OUTPATIENT)
Dept: PHYSICAL THERAPY | Facility: CLINIC | Age: 25
End: 2025-01-03
Payer: COMMERCIAL

## 2025-01-03 NOTE — PROGRESS NOTES
Discharge Summary    Name: Allyssa Kelly  MRN: [id number]  : 2000  Date: 25    Discharge Summary: PT    Discharge Information: Date of last visit 24    Therapy Summary: Patient had been making great progress with physical therapy. However, she failed to maintain any appointments since her previous visit.     Rehab Discharge Reason: Failed to schedule and/or keep follow-up appointment(s)

## 2025-06-25 ENCOUNTER — TELEPHONE (OUTPATIENT)
Dept: RHEUMATOLOGY | Facility: CLINIC | Age: 25
End: 2025-06-25
Payer: COMMERCIAL

## 2025-06-25 NOTE — TELEPHONE ENCOUNTER
Was called by [facility] emergency room.  Patient was emergency room and was having abdominal pain.    Have not seen patient since July of last year and she has changed care because of insurance reasons to [facility].    I have question her diagnosis of systemic lupus and always thought her to have cutaneous lupus.  Last time I saw her she had been on CellCept for skin lesions.    Provider at emergency room so that she had lupus nephritis which I did not diagnose.  She is also pregnant.  Told provider in emergency room to get in contact with rheumatology at [facility] who may be more familiar with her though she is only seen 1 person in October 2024 and also as an inpatient in December 2024

## (undated) DEVICE — CUFF, TOURNIQUET, 18 X 4, DUAL PORT/SNGL BLADDER, DISP, LF

## (undated) DEVICE — DRESSING, GAUZE, PETROLATUM, PATCH, XEROFORM, 1 X 8 IN, STERILE

## (undated) DEVICE — BANDAGE, COMPRESSION, KNITTED, ELASTIC, SNGL VELCRO, 3IN, WHITE

## (undated) DEVICE — Device

## (undated) DEVICE — 20CM X 39CM, MEDIUM DELUXE ARM SLING W/PAD

## (undated) DEVICE — DRESSING, GAUZE, VERSALON, 4 PLY, 4 X 4 IN, STERILE

## (undated) DEVICE — SUTURE, VICRYL, 3-0, 27 IN, SH

## (undated) DEVICE — SOLUTION, CHLORHEXIDINE, 4%, 4OZ

## (undated) DEVICE — DRESSING, ABDOMINAL, TENDERSORB, 8 X 7-1/2 IN, STERILE

## (undated) DEVICE — BANDAGE, GAUZE, COTTON, STERILE, BULKEE II, 4.5IN X 4.1YD

## (undated) DEVICE — SUTURE, PROLENE, 3-0, 30 IN, SH

## (undated) DEVICE — TRAY, DRY PREP, PREMIUM

## (undated) DEVICE — GOWN, SURGICAL, SIRUS, NON REINFORCED, LARGE

## (undated) DEVICE — BLANKET, LOWER BODY, VHA PLUS, ADULT

## (undated) DEVICE — SOLUTION, IRRIGATION, X RX SODIUM CHL 0.9%, 1000ML BTL

## (undated) DEVICE — GLOVE, SURGICAL, PROTEXIS PI , 8.0, PF, LF

## (undated) DEVICE — PADDING, UNDERCAST, WEBRIL, 3 IN X 4 YD, REG, NS

## (undated) DEVICE — COVER, MAYO STAND, W/PAD, 23 IN, DISPOSABLE, PLASTIC, LF, STERILE

## (undated) DEVICE — VESSEL LOOP, RED MAXI, 2 CARD